# Patient Record
Sex: MALE | Race: WHITE | NOT HISPANIC OR LATINO | Employment: OTHER | URBAN - METROPOLITAN AREA
[De-identification: names, ages, dates, MRNs, and addresses within clinical notes are randomized per-mention and may not be internally consistent; named-entity substitution may affect disease eponyms.]

---

## 2017-11-16 ENCOUNTER — TRANSFERRED RECORDS (OUTPATIENT)
Dept: HEALTH INFORMATION MANAGEMENT | Facility: CLINIC | Age: 57
End: 2017-11-16

## 2018-02-06 ENCOUNTER — OFFICE VISIT (OUTPATIENT)
Dept: FAMILY MEDICINE | Facility: CLINIC | Age: 58
End: 2018-02-06
Payer: COMMERCIAL

## 2018-02-06 VITALS
WEIGHT: 256.2 LBS | BODY MASS INDEX: 35.87 KG/M2 | HEART RATE: 76 BPM | HEIGHT: 71 IN | DIASTOLIC BLOOD PRESSURE: 86 MMHG | SYSTOLIC BLOOD PRESSURE: 138 MMHG | TEMPERATURE: 98.1 F | RESPIRATION RATE: 16 BRPM

## 2018-02-06 DIAGNOSIS — L72.3 SEBACEOUS CYST: Primary | ICD-10-CM

## 2018-02-06 PROCEDURE — 10160 PNXR ASPIR ABSC HMTMA BULLA: CPT | Performed by: FAMILY MEDICINE

## 2018-02-06 RX ORDER — NITROGLYCERIN 0.4 MG/1
0.4 TABLET SUBLINGUAL
COMMUNITY
Start: 2014-05-13 | End: 2021-07-23

## 2018-02-06 ASSESSMENT — PAIN SCALES - GENERAL: PAINLEVEL: NO PAIN (1)

## 2018-02-06 NOTE — PATIENT INSTRUCTIONS
Thank you for choosing [facility].  You may be receiving a survey in the mail from [facility] regarding your visit today.  Please take a few minutes to complete and return the survey to let us know how we are doing.      Our Clinic hours are:  Mondays    7:20 am - 7 pm  Tues -  Fri  7:20 am - 5 pm    Clinic Phone: [phone]    The clinic lab opens at 7:30 am Mon - Fri and appointments are required.    [facility]. [phone]  Monday-Thursday 8 am - 7pm  Tues/Wed/Fri 8 am - 5:30 pm

## 2018-02-06 NOTE — PROGRESS NOTES
"  SUBJECTIVE:   Brent Stewart is a 57 year old male who presents to clinic today for the following health issues:      Concern -   Chief Complaint   Patient presents with     Cyst     on right cheek. seems to be getting bigger. and cyst on back of left shoulder       Onset: cyst on cheek x 3 years and cyst on back of left shoulder x 2 years.    He previously has had a sebaceous cyst removed from the scalp    Problem list and histories reviewed & adjusted, as indicated.  Additional history: The cyst on his cheek area was bumped against a box in the garage and has become darker in color since then        Reviewed and updated as needed this visit by clinical staff  Tobacco  Allergies  Meds  Med Hx  Surg Hx  Fam Hx  Soc Hx      Reviewed and updated as needed this visit by Provider             OBJECTIVE:                                                    /86 (BP Location: Right arm, Cuff Size: Adult Large)  Pulse 76  Temp 98.1  F (36.7  C) (Tympanic)  Resp 16  Ht 5' 11\" (1.803 m)  Wt 256 lb 3.2 oz (116.2 kg)  BMI 35.73 kg/m2    GENERAL: healthy, alert and no distress  EYES: Eyes grossly normal to inspection, extraocular movements - intact, and PERRL  SKIN: 1 cm soft sebaceous cyst on the right side of the face by the malar ridge, somewhat bluish in color;  Left upper posterior back has a 1.2 cm soft sebaceous cyst         ASSESSMENT/PLAN:                                                    ASSESSMENT:  1. Sebaceous cyst    Right side of face and left upper back  PLAN:  After informed consent, both of these cysts were prepped with Hibiclens, anesthetized with 1% lidocaine.  Then the cyst was incised and punctured in the whitish material was evacuated using a forceps.  I placed a corner of the gauze dressing into the cavity on the one on the face with instructions to leave that in for 8 hours and then cover with a simple Band-Aid.  In like manner the same procedure was carried out on the cyst on his left " upper back.  After evacuation, this cyst was just covered with gauze with no wick.          Orders Placed This Encounter     PUNCTURE ASPIRATION ABSCESS/HEMATOMA/CYST     PUNCTURE ASPIRATION ABSCESS/HEMATOMA/CYST     nitroGLYcerin (NITROSTAT) 0.4 MG sublingual tablet       Patient Instructions     He was advised to change the dressings in 8 hours and cover with a simple Band-Aid until any drainage stops    Thank you for choosing [facility].  You may be receiving a survey in the mail from [facility] [facility] [employer] regarding your visit today.  Please take a few minutes to complete and return the survey to let us know how we are doing.      Our Clinic hours are:  Mondays    7:20 am - 7 pm  Tues -  Fri  7:20 am - 5 pm    Clinic Phone: [phone]    The clinic lab opens at 7:30 am Mon - Fri and appointments are required.    [facility]  Ph. [phone]  Monday-Thursday 8 am - 7pm  Tues/Wed/Fri 8 am - 5:30 pm             SHEMAR Archuleta  [facility]

## 2018-02-06 NOTE — MR AVS SNAPSHOT
"              After Visit Summary   2/6/2018    Brent Stewart    MRN: [id number]           Patient Information     Date Of Birth          1960        Visit Information        Provider Department      2/6/2018 11:00 AM SHEMAR Archuleta MD [facility]        Today's Diagnoses     Sebaceous cyst    -  1      Care Instructions          Thank you for choosing [facility].  You may be receiving a survey in the mail from [facility] regarding your visit today.  Please take a few minutes to complete and return the survey to let us know how we are doing.      Our Clinic hours are:  Mondays    7:20 am - 7 pm  Tues -  Fri  7:20 am - 5 pm    Clinic Phone: [phone]    The clinic lab opens at 7:30 am Mon - Fri and appointments are required.    [facility]  Ph. [phone]  Monday-Thursday 8 am - 7pm  Tues/Wed/Fri 8 am - 5:30 pm                 Follow-ups after your visit        Who to contact     If you have questions or need follow up information about today's clinic visit or your schedule please contact [facility] directly at [phone].  Normal or non-critical lab and imaging results will be communicated to you by MyChart, letter or phone within 4 business days after the clinic has received the results. If you do not hear from us within 7 days, please contact the clinic through MyChart or phone. If you have a critical or abnormal lab result, we will notify you by phone as soon as possible.  Submit refill requests through [email or web address] or call your pharmacy and they will forward the refill request to us. Please allow 3 business days for your refill to be completed.          Additional Information About Your Visit        MyChart Information     [email or web address] lets you send messages to your doctor, view your test results, renew your prescriptions, schedule appointments and more. To sign up, go to [email or web address] . Click on \"Log in\" on the left side of the screen, " "which will take you to the Welcome page. Then click on \"Sign up Now\" on the right side of the page.     You will be asked to enter the access code listed below, as well as some personal information. Please follow the directions to create your username and password.     Your access code is: [email or web address]  Expires: 2018 12:17 PM     Your access code will  in 90 days. If you need help or a new code, please call your [place] clinic or [phone].        Care EveryWhere ID     This is your Care EveryWhere ID. This could be used by other organizations to access your [place] medical records  [id number]        Your Vitals Were     Pulse Temperature Respirations Height BMI (Body Mass Index)       76 98.1  F (36.7  C) (Tympanic) 16 5' 11\" (1.803 m) 35.73 kg/m2        Blood Pressure from Last 3 Encounters:   18 138/86   14 134/80   14 150/77    Weight from Last 3 Encounters:   18 256 lb 3.2 oz (116.2 kg)   14 251 lb 3.2 oz (113.9 kg)              We Performed the Following     PUNCTURE ASPIRATION ABSCESS/HEMATOMA/CYST     PUNCTURE ASPIRATION ABSCESS/HEMATOMA/CYST        Primary Care Provider Office Phone # Fax #    R Win Archuleta [phone] [address] [facility] [address]        Equal Access to Services     [facility]: Hadii aad ku hadasho Soomaali, waaxda luqadaha, qaybta kaalmada adeegyada, shanta bosch . So [facility] [phone].    ATENCIÓN: Si habla español, tiene a beverly disposición servicios gratuitos de asistencia lingüística. Llame al [phone].    We comply with applicable federal civil rights laws and [place] laws. We do not discriminate on the basis of race, color, national origin, age, disability, sex, sexual orientation, or gender identity.            Thank you!     Thank you for choosing [facility]  for your care. Our goal is always to provide you with excellent care. Hearing back from " our patients is one way we can continue to improve our services. Please take a few minutes to complete the written survey that you may receive in the mail after your visit with us. Thank you!             Your Updated Medication List - Protect others around you: Learn how to safely use, store and throw away your medicines at www.disposemymeds.org.          This list is accurate as of 2/6/18 12:17 PM.  Always use your most recent med list.                   Brand Name Dispense Instructions for use Diagnosis    ATENOLOL PO      Take 25 mg by mouth daily        nitroGLYcerin 0.4 MG sublingual tablet    NITROSTAT     Place 0.4 mg under the tongue        OMEPRAZOLE PO      Take 20 mg by mouth daily

## 2021-07-23 ENCOUNTER — OFFICE VISIT (OUTPATIENT)
Dept: FAMILY MEDICINE | Facility: CLINIC | Age: 61
End: 2021-07-23
Payer: COMMERCIAL

## 2021-07-23 ENCOUNTER — TELEPHONE (OUTPATIENT)
Dept: FAMILY MEDICINE | Facility: CLINIC | Age: 61
End: 2021-07-23

## 2021-07-23 VITALS
BODY MASS INDEX: 33.6 KG/M2 | WEIGHT: 240 LBS | SYSTOLIC BLOOD PRESSURE: 138 MMHG | OXYGEN SATURATION: 96 % | DIASTOLIC BLOOD PRESSURE: 78 MMHG | HEIGHT: 71 IN | HEART RATE: 84 BPM | RESPIRATION RATE: 16 BRPM | TEMPERATURE: 97.2 F

## 2021-07-23 DIAGNOSIS — I10 ESSENTIAL HYPERTENSION: ICD-10-CM

## 2021-07-23 DIAGNOSIS — Z00.00 WELL ADULT EXAM: Primary | ICD-10-CM

## 2021-07-23 DIAGNOSIS — Z11.59 NEED FOR HEPATITIS C SCREENING TEST: ICD-10-CM

## 2021-07-23 DIAGNOSIS — E11.42 TYPE 2 DIABETES MELLITUS WITH DIABETIC POLYNEUROPATHY, WITHOUT LONG-TERM CURRENT USE OF INSULIN (H): Primary | ICD-10-CM

## 2021-07-23 DIAGNOSIS — Z13.220 SCREENING FOR HYPERLIPIDEMIA: ICD-10-CM

## 2021-07-23 DIAGNOSIS — Z12.11 SCREEN FOR COLON CANCER: ICD-10-CM

## 2021-07-23 DIAGNOSIS — N52.9 ERECTILE DYSFUNCTION, UNSPECIFIED ERECTILE DYSFUNCTION TYPE: ICD-10-CM

## 2021-07-23 DIAGNOSIS — B00.9 HSV (HERPES SIMPLEX VIRUS) INFECTION: ICD-10-CM

## 2021-07-23 DIAGNOSIS — E11.42 TYPE 2 DIABETES MELLITUS WITH DIABETIC POLYNEUROPATHY, WITHOUT LONG-TERM CURRENT USE OF INSULIN (H): ICD-10-CM

## 2021-07-23 DIAGNOSIS — Z11.4 SCREENING FOR HIV (HUMAN IMMUNODEFICIENCY VIRUS): ICD-10-CM

## 2021-07-23 LAB
ANION GAP SERPL CALCULATED.3IONS-SCNC: 4 MMOL/L (ref 3–14)
BUN SERPL-MCNC: 14 MG/DL (ref 7–30)
CALCIUM SERPL-MCNC: 9.3 MG/DL (ref 8.5–10.1)
CHLORIDE BLD-SCNC: 110 MMOL/L (ref 94–109)
CHOLEST SERPL-MCNC: 197 MG/DL
CO2 SERPL-SCNC: 27 MMOL/L (ref 20–32)
CREAT SERPL-MCNC: 0.79 MG/DL (ref 0.66–1.25)
FASTING STATUS PATIENT QL REPORTED: NO
GFR SERPL CREATININE-BSD FRML MDRD: >90 ML/MIN/1.73M2
GLUCOSE BLD-MCNC: 111 MG/DL (ref 70–99)
HBA1C MFR BLD: 8.1 % (ref 0–5.6)
HDLC SERPL-MCNC: 51 MG/DL
LDLC SERPL CALC-MCNC: 86 MG/DL
NONHDLC SERPL-MCNC: 146 MG/DL
POTASSIUM BLD-SCNC: 4.1 MMOL/L (ref 3.4–5.3)
SODIUM SERPL-SCNC: 141 MMOL/L (ref 133–144)
TRIGL SERPL-MCNC: 299 MG/DL

## 2021-07-23 PROCEDURE — 80061 LIPID PANEL: CPT | Performed by: FAMILY MEDICINE

## 2021-07-23 PROCEDURE — 86803 HEPATITIS C AB TEST: CPT | Performed by: FAMILY MEDICINE

## 2021-07-23 PROCEDURE — 83036 HEMOGLOBIN GLYCOSYLATED A1C: CPT | Performed by: FAMILY MEDICINE

## 2021-07-23 PROCEDURE — 80048 BASIC METABOLIC PNL TOTAL CA: CPT | Performed by: FAMILY MEDICINE

## 2021-07-23 PROCEDURE — 82043 UR ALBUMIN QUANTITATIVE: CPT | Performed by: FAMILY MEDICINE

## 2021-07-23 PROCEDURE — 36415 COLL VENOUS BLD VENIPUNCTURE: CPT | Performed by: FAMILY MEDICINE

## 2021-07-23 PROCEDURE — 99386 PREV VISIT NEW AGE 40-64: CPT | Performed by: FAMILY MEDICINE

## 2021-07-23 PROCEDURE — 99214 OFFICE O/P EST MOD 30 MIN: CPT | Mod: 25 | Performed by: FAMILY MEDICINE

## 2021-07-23 RX ORDER — ASPIRIN 81 MG/1
81 TABLET ORAL DAILY
COMMUNITY
End: 2023-02-10

## 2021-07-23 RX ORDER — ACYCLOVIR 400 MG/1
400 TABLET ORAL EVERY 8 HOURS
Qty: 15 TABLET | Refills: 0 | Status: SHIPPED | OUTPATIENT
Start: 2021-07-23 | End: 2022-08-16

## 2021-07-23 RX ORDER — METOPROLOL SUCCINATE 50 MG/1
50 TABLET, EXTENDED RELEASE ORAL DAILY
Qty: 90 TABLET | Refills: 4 | Status: SHIPPED | OUTPATIENT
Start: 2021-07-23 | End: 2022-04-06

## 2021-07-23 RX ORDER — SILDENAFIL CITRATE 20 MG/1
20-60 TABLET ORAL DAILY PRN
Qty: 30 TABLET | Refills: 4 | Status: SHIPPED | OUTPATIENT
Start: 2021-07-23

## 2021-07-23 RX ORDER — ACYCLOVIR 400 MG/1
400 TABLET ORAL
COMMUNITY
End: 2021-07-23

## 2021-07-23 ASSESSMENT — ENCOUNTER SYMPTOMS
ARTHRALGIAS: 1
FREQUENCY: 0
COUGH: 1
HEMATOCHEZIA: 0
HEMATURIA: 0
PARESTHESIAS: 1
ABDOMINAL PAIN: 0
SORE THROAT: 0
DIZZINESS: 0
MYALGIAS: 1
DIARRHEA: 0
EYE PAIN: 0
HEARTBURN: 1
HEADACHES: 1
NAUSEA: 0
CHILLS: 0
CONSTIPATION: 0
JOINT SWELLING: 1
SHORTNESS OF BREATH: 0
PALPITATIONS: 0
FEVER: 0

## 2021-07-23 ASSESSMENT — MIFFLIN-ST. JEOR: SCORE: 1905.76

## 2021-07-23 NOTE — TELEPHONE ENCOUNTER
Please call A1c did go up from previous. Now 8.1.  It is in a range where is would definitely recommend starting treatment. Normally we would start metformin but I know he was concerned about potential for diarrhea. Most people do not have a problem with this but it can happen. Other options would include Ozempic or Victoza which are once weekly non-insulin injections that lower blood sugar and also help with weight loss or glipizide which is an oral tablet. Does he have a preference? I would also recommend follow-up with diabetic ed to discuss lifestyle management.

## 2021-07-23 NOTE — LETTER
[facility]  [address]  [facility] [address]  Phone: [phone]       July 28, 2021         Brent Stewart  [facility] [address]            Dear Brent:    We have been trying to reach you by phone. Please see message below from Dr Harvey. Thank you!    Please call A1c did go up from previous. Now 8.1.  It is in a range where is would definitely recommend starting treatment. Normally we would start metformin but I know he was concerned about potential for diarrhea. Most people do not have a problem with this but it can happen. Other options would include Ozempic or Victoza which are once weekly non-insulin injections that lower blood sugar and also help with weight loss or glipizide which is an oral tablet. Does he have a preference? I would also recommend follow-up with diabetic ed to discuss lifestyle management.    Thank you,      Norah Harvey MD

## 2021-07-23 NOTE — PATIENT INSTRUCTIONS
Your BMI is Body mass index is Body mass index is 33.47 kg/m .     A BMI of 18.5 to 24.9 is in the healthy range. A person with a BMI of 25 to 29.9 is considered overweight, and someone with a BMI of 30 or greater is considered obese. More than two-thirds of American adults are considered overweight or obese.  Weight management is a personal decision.  If you are interested in exploring weight loss strategies, the following discussion covers the approaches that may be successful. Body mass index (BMI) is one way to tell whether you are at a healthy weight, overweight, or obese. It measures your weight in relation to your height.  Being overweight or obese increases the risk for further weight gain. Excess weight may lead to heart disease and diabetes.  Creating and following plans for healthy eating and physical activity may help you improve your health.  Weight control is part of healthy lifestyle and includes exercise, emotional health, and healthy eating habits. Careful eating habits lifelong are the mainstay of weight control. Though there are significant health benefits from weight loss, long-term weight loss with diet alone may be very difficult to achieve- studies show long-term success with dietary management alone in less than 10% of people. Attaining a healthy weight may be especially difficult to achieve in those with severe obesity. In some cases, medications, devices and surgical management might be considered.  What can you do?    Keep a food journal to help with mindful eating and finding ways to modify your diet.      Reduce the amount of processed food in your diet. Focus on adding vegetables, and lean proteins.    Reduce dietary carbohydrates. Limiting to  gm of carbohydrates per day has been shows to help boost weight loss.  If you have diabetes or are on diabetic medications do not do this without talking to your physician or healthcare provider.    Diet combined with exercise helps  maintain muscle while optimizing fat loss. Strength training is particularly important for building and maintaining muscle mass. Exercise helps reduce stress, increase energy, and improves fitness. Increasing exercise without diet control, however, may not burn enough calories to loose weight.         Start walking three days a week 10-20 minutes at a time    Work towards walking thirty minutes five days a week      Eventually, increase the speed of your walking for 1-2 minutes at time    In addition, we recommend that you review healthy lifestyles and methods for weight loss available through the National Institutes of Health patient information sites:  http://win.niddk.nih.gov/publications/index.htm    Also look into health and wellness programs that may be available through your health insurance provider, employer, local community center, or [employer].

## 2021-07-23 NOTE — NURSING NOTE
"Initial /78   Pulse 84   Temp 97.2  F (36.2  C) (Tympanic)   Resp 16   Ht 1.803 m (5' 11\")   Wt 108.9 kg (240 lb)   SpO2 96%   BMI 33.47 kg/m   Estimated body mass index is 33.47 kg/m  as calculated from the following:    Height as of this encounter: 1.803 m (5' 11\").    Weight as of this encounter: 108.9 kg (240 lb). .      "

## 2021-07-23 NOTE — PROGRESS NOTES
SUBJECTIVE:   CC: Brnet Stewart is an 61 year old male who presents for preventative health visit.       Patient has been advised of split billing requirements and indicates understanding: Yes  Healthy Habits:     Getting at least 3 servings of Calcium per day:  Yes    Bi-annual eye exam:  Yes    Dental care twice a year:  NO    Sleep apnea or symptoms of sleep apnea:  None    Diet:  Diabetic and Other    Frequency of exercise:  6-7 days/week    Duration of exercise:  30-45 minutes    Taking medications regularly:  Yes    Medication side effects:  None    PHQ-2 Total Score: 0    Additional concerns today:  Yes      Here to establish care. History of diabetes, hypertension, gerd, hsv and ED. Needs labs and medication refills.        Today's PHQ-2 Score:   PHQ-2 (  [employer]) 2021   Q1: Little interest or pleasure in doing things 0   Q2: Feeling down, depressed or hopeless 0   PHQ-2 Score 0   Q1: Little interest or pleasure in doing things Not at all   Q2: Feeling down, depressed or hopeless Not at all   PHQ-2 Score 0       Abuse: Current or Past(Physical, Sexual or Emotional)- NA  Do you feel safe in your environment?     Have you ever done Advance Care Planning? (For example, a Health Directive, POLST, or a discussion with a medical provider or your loved ones about your wishes):     Social History     Tobacco Use     Smoking status: Former Smoker     Types: Cigarettes     Quit date: 2005     Years since quittin.3     Smokeless tobacco: Former User   Substance Use Topics     Alcohol use: Yes     Comment: occas.         Alcohol Use 2021   Prescreen: >3 drinks/day or >7 drinks/week? Yes   AUDIT SCORE  5       Last PSA: No results found for: PSA    Reviewed orders with patient. Reviewed health maintenance and updated orders accordingly - Yes  Labs reviewed in EPIC  Patient Active Problem List   Diagnosis     Senile nuclear sclerosis     Esophageal reflux     Essential hypertension     Abnormal  chest CT     Right knee pain     Status post total right knee replacement     GERD (gastroesophageal reflux disease)     Type 2 diabetes mellitus with diabetic polyneuropathy, without long-term current use of insulin (H)     HSV (herpes simplex virus) infection     Erectile dysfunction, unspecified erectile dysfunction type     Past Surgical History:   Procedure Laterality Date     ARTHROSCOPY KNEE RT/LT Right     meniscectomy     HERNIA REPAIR, UMBILICAL  1980     JOINT REPLACEMTN, KNEE RT/LT Right 2015    Joint Replacement knee RT/LT     PHACOEMULSIFICATION WITH STANDARD INTRAOCULAR LENS IMPLANT  2014    Procedure: PHACOEMULSIFICATION WITH STANDARD INTRAOCULAR LENS IMPLANT;  Surgeon: Issac Lee MD;  Location: [facility]     PHACOEMULSIFICATION WITH STANDARD INTRAOCULAR LENS IMPLANT  7/3/2014    Procedure: PHACOEMULSIFICATION WITH STANDARD INTRAOCULAR LENS IMPLANT;  Surgeon: Issac Lee MD;  Location: [facility] OR       Social History     Tobacco Use     Smoking status: Former Smoker     Types: Cigarettes     Quit date: 2005     Years since quittin.3     Smokeless tobacco: Former User   Substance Use Topics     Alcohol use: Yes     Comment: occas.     Family History   Problem Relation Age of Onset     Diabetes Mother      Cerebrovascular Disease Mother      Diabetes Father      Cancer Father      Coronary Artery Disease Early Onset Father 55        CABG         Current Outpatient Medications   Medication Sig Dispense Refill     acyclovir (ZOVIRAX) 400 MG tablet Take 1 tablet (400 mg) by mouth every 8 hours for 5 days 15 tablet 0     aspirin 81 MG EC tablet Take 81 mg by mouth daily       metoprolol succinate ER (TOPROL-XL) 50 MG 24 hr tablet Take 1 tablet (50 mg) by mouth daily 90 tablet 4     Naproxen Sodium (ALEVE PO)        OMEPRAZOLE PO Take 20 mg by mouth daily       sildenafil (REVATIO) 20 MG tablet Take 1-3 tablets (20-60 mg) by mouth daily as needed (1 hour prior to sexual  activity) 30 tablet 4     glipiZIDE (GLUCOTROL XL) 5 MG 24 hr tablet Take 1 tablet (5 mg) by mouth daily 30 tablet 3     Allergies   Allergen Reactions     Nka [No Known Allergies]        Reviewed and updated as needed this visit by clinical staff  Tobacco  Allergies  Meds  Problems  Med Hx  Surg Hx  Fam Hx  Soc Hx          Reviewed and updated as needed this visit by Provider  Tobacco  Allergies  Meds  Problems  Med Hx  Surg Hx  Fam Hx         Past Medical History:   Diagnosis Date     Hypertension       Past Surgical History:   Procedure Laterality Date     ARTHROSCOPY KNEE RT/LT Right 7/14    meniscectomy     HERNIA REPAIR, UMBILICAL  1980     JOINT REPLACEMTN, KNEE RT/LT Right 11/2015    Joint Replacement knee RT/LT     PHACOEMULSIFICATION WITH STANDARD INTRAOCULAR LENS IMPLANT  6/19/2014    Procedure: PHACOEMULSIFICATION WITH STANDARD INTRAOCULAR LENS IMPLANT;  Surgeon: Issac Lee MD;  Location: [facility]     PHACOEMULSIFICATION WITH STANDARD INTRAOCULAR LENS IMPLANT  7/3/2014    Procedure: PHACOEMULSIFICATION WITH STANDARD INTRAOCULAR LENS IMPLANT;  Surgeon: Issac Lee MD;  Location: [facility] OR       Review of Systems   Constitutional: Negative for chills and fever.   HENT: Positive for congestion and hearing loss. Negative for ear pain and sore throat.    Eyes: Negative for pain and visual disturbance.   Respiratory: Positive for cough. Negative for shortness of breath.    Cardiovascular: Positive for peripheral edema. Negative for chest pain and palpitations.   Gastrointestinal: Positive for heartburn. Negative for abdominal pain, constipation, diarrhea, hematochezia and nausea.   Genitourinary: Positive for impotence. Negative for discharge, frequency, genital sores, hematuria and urgency.   Musculoskeletal: Positive for arthralgias, joint swelling and myalgias.   Skin: Negative for rash.   Neurological: Positive for headaches and paresthesias. Negative for dizziness.  "  Psychiatric/Behavioral: Negative for mood changes.         OBJECTIVE:   /78   Pulse 84   Temp 97.2  F (36.2  C) (Tympanic)   Resp 16   Ht 1.803 m (5' 11\")   Wt 108.9 kg (240 lb)   SpO2 96%   BMI 33.47 kg/m      Physical Exam  GENERAL: Pleasant, well appearing male.  HEENT: PERRL, EOMI.  NECK: supple, no thyromegaly or thyroid masses, no lymphadenopathy.  CV: RRR, no murmurs, rubs or gallops.  LUNGS: Clear to auscultation bilaterally, normal effort.  ABDOMEN: Soft, non-distended, non-tender.  No hepatosplenomegaly or palpable masses.    SKIN: warm and dry without obvious rashes.   EXTREMITIES: No edema, normal pulses.            ASSESSMENT/PLAN:   1. Well adult exam    2. Type 2 diabetes mellitus with diabetic polyneuropathy, without long-term current use of insulin (H)  Check labs. Further work-up and management as dictated by results.   - Hemoglobin A1c; Future  - Albumin Random Urine Quantitative with Creat Ratio; Future  - Albumin Random Urine Quantitative with Creat Ratio  - Hemoglobin A1c    3. Essential hypertension  Well controlled. Refilled medication. Check labs.    - metoprolol succinate ER (TOPROL-XL) 50 MG 24 hr tablet; Take 1 tablet (50 mg) by mouth daily  Dispense: 90 tablet; Refill: 4  - Basic metabolic panel; Future  - Basic metabolic panel    4. HSV (herpes simplex virus) infection  Well controlled. Refilled medication.     - acyclovir (ZOVIRAX) 400 MG tablet; Take 1 tablet (400 mg) by mouth every 8 hours for 5 days  Dispense: 15 tablet; Refill: 0    5. Erectile dysfunction, unspecified erectile dysfunction type  Stable. Refilled medication.    - sildenafil (REVATIO) 20 MG tablet; Take 1-3 tablets (20-60 mg) by mouth daily as needed (1 hour prior to sexual activity)  Dispense: 30 tablet; Refill: 4    6. Screening for hyperlipidemia  - Lipid panel reflex to direct LDL Fasting; Future  - Lipid panel reflex to direct LDL Fasting    7. Screen for colon cancer  - Adult Gastro Ref - " "Procedure Only; Future    8. Screening for HIV (human immunodeficiency virus)    9. Need for hepatitis C screening test  - Hepatitis C Screen Reflex to HCV RNA Quant and Genotype; Future  - Hepatitis C Screen Reflex to HCV RNA Quant and Genotype    Patient has been advised of split billing requirements and indicates understanding: COUNSELING:   Reviewed preventive health counseling, as reflected in patient instructions    Estimated body mass index is 33.47 kg/m  as calculated from the following:    Height as of this encounter: 1.803 m (5' 11\").    Weight as of this encounter: 108.9 kg (240 lb).         He reports that he quit smoking about 16 years ago. His smoking use included cigarettes. He has quit using smokeless tobacco.      Counseling Resources:  ATP IV Guidelines  Pooled Cohorts Equation Calculator  FRAX Risk Assessment  ICSI Preventive Guidelines  Dietary Guidelines for Americans, 2010  USDA's MyPlate  ASA Prophylaxis  Lung CA Screening    Norah Harvey MD  [facility]  "

## 2021-07-24 LAB
CREAT UR-MCNC: 119 MG/DL
MICROALBUMIN UR-MCNC: 14 MG/DL
MICROALBUMIN/CREAT UR: 11.76 MG/G CR (ref 0–17)

## 2021-07-26 ENCOUNTER — TELEPHONE (OUTPATIENT)
Dept: FAMILY MEDICINE | Facility: CLINIC | Age: 61
End: 2021-07-26

## 2021-07-26 LAB — HCV AB SERPL QL IA: NONREACTIVE

## 2021-07-26 NOTE — TELEPHONE ENCOUNTER
Diabetes Education Scheduling Outreach #1:    Call to patient to schedule. Invalid phone number.    Erinn Ferraro OnCall  Diabetes and Nutrition Scheduling

## 2021-08-02 ENCOUNTER — TELEPHONE (OUTPATIENT)
Dept: FAMILY MEDICINE | Facility: CLINIC | Age: 61
End: 2021-08-02

## 2021-08-02 DIAGNOSIS — E11.42 TYPE 2 DIABETES MELLITUS WITH DIABETIC POLYNEUROPATHY, WITHOUT LONG-TERM CURRENT USE OF INSULIN (H): Primary | ICD-10-CM

## 2021-08-02 NOTE — TELEPHONE ENCOUNTER
"Unable to leave message. No answering machine. Recording said \"not available and to try again later.\"  Janis Dean RN  "

## 2021-08-02 NOTE — TELEPHONE ENCOUNTER
Reason for call:  Patient reporting a symptom    Symptom or request: Pt calling - He rec'd a letter from Dr. Harvey.  We had his wrong phone # and it has been updated in pt's chart.  Pt calling to answer Dr. Harvey's questions about his diabetes.  Please call patient and advise.      Duration (how long have symptoms been present): ongoing    Have you been treated for this before? Yes    Additional comments:     Phone Number patient can be reached at:  Home number on file [phone] (home)    Best Time:  any    Can we leave a detailed message on this number:  YES    Call taken on 8/2/2021 at 3:14 PM by Corrina Lozada

## 2021-08-03 NOTE — TELEPHONE ENCOUNTER
Spoke with pt and he would like to try the Glipizide for his elevated A1C.  [facility].  Pt will check if med is @ the Pharmacy on 8/11/21   Theodora Blanchard

## 2021-08-04 RX ORDER — GLIPIZIDE 5 MG/1
5 TABLET, FILM COATED, EXTENDED RELEASE ORAL DAILY
Qty: 30 TABLET | Refills: 3 | Status: SHIPPED | OUTPATIENT
Start: 2021-08-04 | End: 2021-09-20

## 2021-08-10 ENCOUNTER — TELEPHONE (OUTPATIENT)
Dept: FAMILY MEDICINE | Facility: CLINIC | Age: 61
End: 2021-08-10

## 2021-08-10 DIAGNOSIS — R91.8 PULMONARY NODULES: Primary | ICD-10-CM

## 2021-08-11 NOTE — TELEPHONE ENCOUNTER
Please call:    I was reviewing his previous records and found an abnormal chest CT from 6/12/2014. There was a 4.5 indeterminate pulmonary nodule and follow-up chest CT was recommended but I could not find any follow-up imaging. I would recommend a chest CT to follow-up on this. If resolved or stable no further follow-up needed after this.  If it's grown then he wound need to follow-up with pulmonology.    6/12/2014 8:59 AM     INDICATION: Chest pain   COMPARISON: None.     FINDINGS:     LIMITED CHEST: There are several small noncalcified nodules in both lungs   including a dominant 4.5 mm nodule in the superior segment of the lingula   image 21 series 5. There is a 2 mm nodule in the left lower lobe on image   32. There is a 2 mm subpleural nodule in the right upper lobe image 10   series 5. There is a 2 mm subpleural nodule in the right middle lobe on   image 22.   LIMITED MEDIASTINUM: Negative.   LIMITED UPPER ABDOMEN: Negative     CONCLUSION:     1.  There are several small indeterminate nodules in both lungs including a    dominant 4.5 mm nodule in the lingula. See pulmonary nodule guidelines   below.   2.  Please refer to cardiologist's dictation for the cardiac CT report.     Recommendations for pulmonary nodule followup according to Fleischner   Society Guidelines, Radiology 2005; 237:395-400.     Nodule size less than or equal to 4 mm   Low-risk patients: No followup needed.   High-risk patients: Followup at 12 months and if no change, no further   imaging needed.     Nodule size > 4-6 mm   Low-risk patients: Followup at 12 months and if no change, no further   imaging needed.   High-risk patients: Initial followup CT at 6-12 months and then at 18-24   months if no change.

## 2021-08-11 NOTE — TELEPHONE ENCOUNTER
"Patient reports that he had a follow up CT at [facility] and was told \"it was from a previous pneumonia\"and no further follow up was necessary.    Patient is asking for something for migraines - never been treated in the past but would like to try something prescription.  Usually uses OTC migraine med.  Please advise.    Vale Whalen RN    "

## 2021-08-13 NOTE — RESULT ENCOUNTER NOTE
Disregard previous note. Patient was already notified of results:          Please call A1c did go up from previous. Now 8.1.  It is in a range where is would definitely recommend starting treatment. Normally we would start metformin but I know he was concerned about potential for diarrhea. Most people do not have a problem with this but it can happen. Other options would include Ozempic or Victoza which are once weekly non-insulin injections that lower blood sugar and also help with weight loss or glipizide which is an oral tablet. Does he have a preference? I would also recommend follow-up with diabetic ed to discuss lifestyle management.

## 2021-09-20 ENCOUNTER — ALLIED HEALTH/NURSE VISIT (OUTPATIENT)
Dept: EDUCATION SERVICES | Facility: CLINIC | Age: 61
End: 2021-09-20
Payer: COMMERCIAL

## 2021-09-20 DIAGNOSIS — E11.9 DIABETES MELLITUS WITHOUT COMPLICATION (H): Primary | ICD-10-CM

## 2021-09-20 DIAGNOSIS — E11.42 TYPE 2 DIABETES MELLITUS WITH DIABETIC POLYNEUROPATHY, WITHOUT LONG-TERM CURRENT USE OF INSULIN (H): ICD-10-CM

## 2021-09-20 PROCEDURE — G0108 DIAB MANAGE TRN  PER INDIV: HCPCS

## 2021-09-20 NOTE — PATIENT INSTRUCTIONS
Diabetes Support Resources:  1. Stop Glipizide    2. Trulicity 0.75 mg weekly    3. Try to follow the meal plan I gave you.   Portion out your carbs.  Starting your day with breakfast.      4. Check your BG when you wake up ( )  Some days test 2 hrs after one of your meals (<180)     Bring blood glucose meter and logbook with you to all doctor and follow-up appointments.    Diabetes Education Telephone Visit Follow-up:    We realize your time is valuable and your health is important! We offer a convenient Telephone Visit follow up! It s a quick way to check in for a medication dose adjustment without having to come back to clinic as soon.    Telephone Visits are often covered by insurance. Please check with your insurance plan to see if this type of visit is covered. If not, the cost is less expensive than an office visit:      Up to 10 minutes (Code 39566): $30    11-20 minutes (Code 55060): $59    More than 20 minutes (Code 03811): $85    Talk with your Diabetes Educator if you want to learn more.      [facility]:  For Your Diabetes Education and Nutrition Appointments Call:  [phone]   For Diabetes Education or Nutrition Related Questions:   Phone: [phone]  Send [employer] Message   If you need a medication refill please contact your pharmacy. Please allow 3 business days for your refills to be completed.

## 2021-09-20 NOTE — PROGRESS NOTES
"Diabetes Self-Management Education & Support    Presents for: Individual review    SUBJECTIVE/OBJECTIVE:  Presents for: Individual review  Accompanied by: Self  Diabetes education in the past 24mo: Yes (in [place] in [place])  Focus of Visit: Healthy Eating, Monitoring, Taking Medication, Assistance w/ making life changes  Diabetes type: Type 2  Disease course: Stable  Diabetes management related comments/concerns: Not at this time, what i can eat and drink  Transportation concerns: No  Difficulty affording diabetes medication?: No  Difficulty affording diabetes testing supplies?: No  Other concerns:: None  Cultural Influences/Ethnic Background:  American      Diabetes Symptoms & Complications:  Fatigue: Sometimes  Neuropathy: No  Polydipsia: No  Polyphagia: Sometimes  Polyuria: No  Visual change: No  Slow healing wounds: No  Symptom course: Improving  Weight trend: Decreasing  Autonomic neuropathy: No  CVA: Other  Heart disease: No  Nephropathy: No  Peripheral neuropathy: No  Peripheral Vascular Disease: No  Retinopathy: No  Sexual dysfunction: No    Patient Problem List and Family Medical History reviewed for relevant medical history, current medical status, and diabetes risk factors.    Vitals:  There were no vitals taken for this visit.  Estimated body mass index is 33.47 kg/m  as calculated from the following:    Height as of 7/23/21: 1.803 m (5' 11\").    Weight as of 7/23/21: 108.9 kg (240 lb).   Last 3 BP:   BP Readings from Last 3 Encounters:   07/23/21 138/78   02/06/18 138/86   11/07/14 134/80       History   Smoking Status     Former Smoker     Types: Cigarettes     Quit date: 4/1/2005   Smokeless Tobacco     Former User       Labs:  Lab Results   Component Value Date    A1C 8.1 07/23/2021     Lab Results   Component Value Date     07/23/2021     Lab Results   Component Value Date    LDL 86 07/23/2021     Direct Measure HDL   Date Value Ref Range Status   07/23/2021 51 >=40 mg/dL Final     Comment:     " 0-19 years:       Greater than or equal to 45 mg/dL   Low: Less than 40 mg/dL   Borderline low: 40-44 mg/dL     20 years and older:   Female: Greater than or equal to 50 mg/dL   Male:   Greater than or equal to 40 mg/dL        ]  GFR Estimate   Date Value Ref Range Status   07/23/2021 >90 >60 mL/min/1.73m2 Final     Comment:     As of July 11, 2021, eGFR is calculated by the CKD-EPI creatinine equation, without race adjustment. eGFR can be influenced by muscle mass, exercise, and diet. The reported eGFR is an estimation only and is only applicable if the renal function is stable.     No results found for: GFRESTBLACK  Lab Results   Component Value Date    CR 0.79 07/23/2021     No results found for: MICROALBUMIN    Healthy Eating:  Healthy Eating Assessed Today: Yes  Cultural/Confucianist diet restrictions?: No  Meal planning/habits: Avoiding sweets, Calorie counting, Carb counting, Low salt  How many times a week on average do you eat food made away from home (restaurant/take-out)?: 1  Meals include: Dinner, Afternoon Snack, Evening Snack  Breakfast: skips if he is eating could be eggs  Lunch: sandwich, ham,cheese tang,  Dinner: meatloaf, mashed potatoes and gravy.  Snacks: likes apples,  Other: has been doing keto diet  Beverages: Water, Coffee, Milk, Juice, Alcohol  Has patient met with a dietitian in the past?: No    Being Active:  Being Active Assessed Today: Yes  Exercise:: Yes  Days per week of moderate to strenuous exercise (like a brisk walk): 7  On average, minutes per day of exercise at this level: 40  How intense was your typical exercise? : Moderate (like brisk walking)  Exercise Minutes per Week: 280  Barrier to exercise: Other    Monitoring:  Monitoring Assessed Today: Yes  Did patient bring glucose meter to appointment? : Yes  Blood Glucose Meter: One Touch  Times checking blood sugar at home (number): 2  Times checking blood sugar at home (per): Day  Blood glucose trend:  Decreasing    121,124,109,115,123,105,138 prior to the his fasting numbers Justin did have blood sugars in the 500s and he said at one time 700    Taking Medications:  Diabetes Medication(s)     Incretin Mimetic Agents (GLP-1 Receptor Agonists)       dulaglutide (TRULICITY) 0.75 MG/0.5ML pen    Inject 0.75 mg Subcutaneous every 7 days          Taking Medication Assessed Today: Yes  Current Treatments: Oral Medication (taken by mouth)  Problems taking diabetes medications regularly?: No  Diabetes medication side effects?: No    Problem Solving:  Problem Solving Assessed Today: No              Reducing Risks:  Diabetes Risks: Age over 45 years, Family History, Hypertriglyceridemia  CAD Risks: Diabetes Mellitus, Male sex, Obesity, Family history    Healthy Coping:  Healthy Coping Assessed Today: Yes  Emotional response to diabetes: Uncertain, Acceptance  Informal Support system:: Family  Stage of change: PREPARATION (Decided to change - considering how)  Patient Activation Measure Survey Score:  No flowsheet data found.    Diabetes knowledge and skills assessment:   Patient is knowledgeable in diabetes management concepts related to: Being Active, Monitoring and Taking Medication    Patient needs further education on the following diabetes management concepts: Healthy Eating, Monitoring and Taking Medication    Based on learning assessment above, most appropriate setting for further diabetes education would be: Individual setting.      INTERVENTIONS:    Education provided today on:  AADE Self-Care Behaviors:  Diabetes Pathophysiology  Healthy Eating: carbohydrate counting, consistency in amount, composition, and timing of food intake, weight reduction, heart healthy diet, eating out, portion control, plate planning method and label reading  Being Active: relationship to blood glucose and describe appropriate activity program  Monitoring: purpose, proper technique, log and interpret results, individual blood glucose  targets and frequency of monitoring  Taking Medication: action of prescribed medication, drawing up, administering and storing injectable diabetes medications, proper site selection and rotation for injections, side effects of prescribed medications and when to take medications  GLP-1 administration technique taught today. Patient verbalized understanding and was able to perform an accurate return demonstration of administration technique. Side effects were discussed, if patient has any abdominal pain, with or without nausea and/or vomiting, stop medication, call provider, clinic or go to the emergency room.    Opportunities for ongoing education and support in diabetes-self management were discussed.    Pt verbalized understanding of concepts discussed and recommendations provided today.       Education Materials Provided:  [facility] Understanding Diabetes Booklet, Carbohydrate Counting and My Plate Planner      ASSESSMENT:  Rena comes today to learn more about diabetes and management he has been on glipizide which does not think for his liver output, and Justin tends to follow a keto-based diet and his blood sugars go up and down down during the day when he is not eating breakfast or lunch and very high at dinner when he chooses to over eat his blood sugars in some mornings are high he is back getting enough carbs in.  I reviewed the meal plan suggested for him to follow we have taken him off glipizide and he is going to trial Trulicity 0.5 mg weekly and see how that works for him he stated Metformin did not agree with him in the past so at this point he was not willing to restart it depending on his blood sugars when I see him again we can increase his Trulicity to 1.5 mg dose he is to let me know after he finishes his fourth pen to see where his blood sugars are he is to check his blood sugars every morning and 2 hours after some of his meals at first I think he was a little reluctant to learn about  diabetes by the time he left he was glad he came        Patient's most recent   Lab Results   Component Value Date    A1C 8.1 07/23/2021    is not meeting goal of <7.0    PLAN  See Patient Instructions for co-developed, patient-stated behavior change goals.  AVS printed and provided to patient today. See Follow-Up section for recommended follow-up.    Amberly Grady RN/LOLLY Ferraro Diabetes Educator    Time Spent: 60 minutes  Encounter Type: Individual    Any diabetes medication dose changes were made via the CDE Protocol and Collaborative Practice Agreement with the patient's primary care provider. A copy of this encounter was shared with the provider.

## 2021-09-22 ENCOUNTER — TELEPHONE (OUTPATIENT)
Dept: EDUCATION SERVICES | Facility: CLINIC | Age: 61
End: 2021-09-22

## 2021-09-22 NOTE — TELEPHONE ENCOUNTER
Justin Gaming left us a message that his new landline phone number is [phone]. I put this number in his chart. He said this number might be easier to get ahold of him at than his cell phone.     That was all he said in his message. Were you expecting to hear from him about anything today? I see you saw him on Monday.    Thanks!    Shona Lee RN, [facility]

## 2021-09-22 NOTE — TELEPHONE ENCOUNTER
No follow-up needed. He was just leaving his new number (landline phone) because his cell phone has not been reliable.    Shona Lee RN, [facility]

## 2021-11-05 ENCOUNTER — TELEPHONE (OUTPATIENT)
Dept: EDUCATION SERVICES | Facility: CLINIC | Age: 61
End: 2021-11-05

## 2021-11-05 NOTE — TELEPHONE ENCOUNTER
Nathan/Di,    Justin left a message last night that he can't make his Monday appt in [facility] with Amberly. I cancelled that appt and put a hold on the opening for Amberly to use only.    Can you please contact Justin to help him reschedule with Amberly.    Thanks!    Shona Lee RN, [facility]

## 2021-12-20 ENCOUNTER — TELEPHONE (OUTPATIENT)
Dept: EDUCATION SERVICES | Facility: CLINIC | Age: 61
End: 2021-12-20

## 2021-12-20 DIAGNOSIS — Z91.199 NO-SHOW FOR APPOINTMENT: Primary | ICD-10-CM

## 2022-04-06 ENCOUNTER — OFFICE VISIT (OUTPATIENT)
Dept: FAMILY MEDICINE | Facility: CLINIC | Age: 62
End: 2022-04-06
Payer: COMMERCIAL

## 2022-04-06 VITALS
TEMPERATURE: 97.8 F | WEIGHT: 247 LBS | OXYGEN SATURATION: 97 % | SYSTOLIC BLOOD PRESSURE: 148 MMHG | RESPIRATION RATE: 16 BRPM | HEART RATE: 89 BPM | DIASTOLIC BLOOD PRESSURE: 92 MMHG | BODY MASS INDEX: 34.58 KG/M2 | HEIGHT: 71 IN

## 2022-04-06 DIAGNOSIS — G89.29 CHRONIC PAIN OF BOTH KNEES: ICD-10-CM

## 2022-04-06 DIAGNOSIS — I10 ESSENTIAL HYPERTENSION: ICD-10-CM

## 2022-04-06 DIAGNOSIS — B00.9 HSV (HERPES SIMPLEX VIRUS) INFECTION: ICD-10-CM

## 2022-04-06 DIAGNOSIS — M25.562 CHRONIC PAIN OF BOTH KNEES: ICD-10-CM

## 2022-04-06 DIAGNOSIS — M25.561 CHRONIC PAIN OF BOTH KNEES: ICD-10-CM

## 2022-04-06 DIAGNOSIS — Z12.11 SCREEN FOR COLON CANCER: ICD-10-CM

## 2022-04-06 DIAGNOSIS — E11.42 TYPE 2 DIABETES MELLITUS WITH DIABETIC POLYNEUROPATHY, WITHOUT LONG-TERM CURRENT USE OF INSULIN (H): Primary | ICD-10-CM

## 2022-04-06 LAB
ALBUMIN SERPL-MCNC: 4 G/DL (ref 3.4–5)
ALP SERPL-CCNC: 90 U/L (ref 40–150)
ALT SERPL W P-5'-P-CCNC: 34 U/L (ref 0–70)
ANION GAP SERPL CALCULATED.3IONS-SCNC: 7 MMOL/L (ref 3–14)
AST SERPL W P-5'-P-CCNC: 15 U/L (ref 0–45)
BILIRUB SERPL-MCNC: 0.6 MG/DL (ref 0.2–1.3)
BUN SERPL-MCNC: 16 MG/DL (ref 7–30)
CALCIUM SERPL-MCNC: 9.5 MG/DL (ref 8.5–10.1)
CHLORIDE BLD-SCNC: 104 MMOL/L (ref 94–109)
CHOLEST SERPL-MCNC: 204 MG/DL
CO2 SERPL-SCNC: 27 MMOL/L (ref 20–32)
CREAT SERPL-MCNC: 0.79 MG/DL (ref 0.66–1.25)
FASTING STATUS PATIENT QL REPORTED: YES
GFR SERPL CREATININE-BSD FRML MDRD: >90 ML/MIN/1.73M2
GLUCOSE BLD-MCNC: 149 MG/DL (ref 70–99)
HBA1C MFR BLD: 7.5 % (ref 0–5.6)
HDLC SERPL-MCNC: 39 MG/DL
LDLC SERPL CALC-MCNC: 107 MG/DL
LDLC SERPL CALC-MCNC: ABNORMAL MG/DL
NONHDLC SERPL-MCNC: 165 MG/DL
POTASSIUM BLD-SCNC: 4.2 MMOL/L (ref 3.4–5.3)
PROT SERPL-MCNC: 8 G/DL (ref 6.8–8.8)
SODIUM SERPL-SCNC: 138 MMOL/L (ref 133–144)
TRIGL SERPL-MCNC: 409 MG/DL

## 2022-04-06 PROCEDURE — 80061 LIPID PANEL: CPT | Performed by: FAMILY MEDICINE

## 2022-04-06 PROCEDURE — 83721 ASSAY OF BLOOD LIPOPROTEIN: CPT | Mod: 59 | Performed by: FAMILY MEDICINE

## 2022-04-06 PROCEDURE — 83036 HEMOGLOBIN GLYCOSYLATED A1C: CPT | Performed by: FAMILY MEDICINE

## 2022-04-06 PROCEDURE — 36415 COLL VENOUS BLD VENIPUNCTURE: CPT | Performed by: FAMILY MEDICINE

## 2022-04-06 PROCEDURE — 99214 OFFICE O/P EST MOD 30 MIN: CPT | Performed by: FAMILY MEDICINE

## 2022-04-06 PROCEDURE — 80053 COMPREHEN METABOLIC PANEL: CPT | Performed by: FAMILY MEDICINE

## 2022-04-06 RX ORDER — LISINOPRIL 5 MG/1
5 TABLET ORAL DAILY
Qty: 30 TABLET | Refills: 1 | Status: SHIPPED | OUTPATIENT
Start: 2022-04-06 | End: 2022-10-27

## 2022-04-06 RX ORDER — ACYCLOVIR 400 MG/1
400 TABLET ORAL EVERY 8 HOURS
Qty: 15 TABLET | Refills: 4 | Status: ON HOLD | OUTPATIENT
Start: 2022-04-06 | End: 2022-11-04

## 2022-04-06 NOTE — PROGRESS NOTES
"  Assessment & Plan     Type 2 diabetes mellitus with diabetic polyneuropathy, without long-term current use of insulin (H)  Well controlled. Labs as below.  - OPTOMETRY REFERRAL; Future  - HEMOGLOBIN A1C; Future  - Lipid panel reflex to direct LDL Fasting; Future  - Comprehensive metabolic panel; Future  - HEMOGLOBIN A1C  - Lipid panel reflex to direct LDL Fasting  - Comprehensive metabolic panel  - LDL cholesterol direct    Essential hypertension  Previous blood pressures have been normal.  Advised monitor outpatient blood pressures.  If persistently elevated increase lisinopril.  - lisinopril (ZESTRIL) 5 MG tablet; Take 1 tablet (5 mg) by mouth daily    HSV (herpes simplex virus) infection  - acyclovir (ZOVIRAX) 400 MG tablet; Take 1 tablet (400 mg) by mouth every 8 hours for 5 days    Chronic pain of both knees  - XR Knee Bilateral 1/2 Views; Future    Screen for colon cancer  - Adult Gastro Ref - Procedure Only; Future             BMI:   Estimated body mass index is 34.69 kg/m  as calculated from the following:    Height as of this encounter: 1.797 m (5' 10.75\").    Weight as of this encounter: 112 kg (247 lb).           No follow-ups on file.    Norah Harvey MD  [facility]    Angel Anderson is a 61 year old who presents for the following health issues     Chief Complaint   Patient presents with     Diabetes     Cough         HPI     Diabetes Follow-up    How often are you checking your blood sugar? One time daily  What time of day are you checking your blood sugars (select all that apply)?  After meals  Have you had any blood sugars above 200?  No   Have you had any blood sugars below 70?  No    What symptoms do you notice when your blood sugar is low?  Shaky    What concerns do you have today about your diabetes? None     Do you have any of these symptoms? (Select all that apply)  No numbness or tingling in feet.  No redness, sores or blisters on feet.  No complaints " of excessive thirst.  No reports of blurry vision.  No significant changes to weight.    Have you had a diabetic eye exam in the last 12 months? No        BP Readings from Last 2 Encounters:   04/06/22 (!) 148/92   07/23/21 138/78     Hemoglobin A1C (%)   Date Value   04/06/2022 7.5 (H)   07/23/2021 8.1 (H)     LDL Cholesterol Calculated   Date Value   04/06/2022      Comment:     Cannot estimate LDL when triglyceride exceeds 400 mg/dL   07/23/2021 86 mg/dL     LDL Cholesterol Direct (mg/dL)   Date Value   04/06/2022 107 (H)               Hypertension Follow-up      Do you check your blood pressure regularly outside of the clinic? No     Are you following a low salt diet? Yes     Are your blood pressures ever more than 140 on the top number (systolic) OR more   than 90 on the bottom number (diastolic), for example 140/90? No      How many servings of fruits and vegetables do you eat daily?  2-3    On average, how many sweetened beverages do you drink each day (Examples: soda, juice, sweet tea, etc.  Do NOT count diet or artificially sweetened beverages)?   3    How many days per week do you exercise enough to make your heart beat faster? 3 or less    How many minutes a day do you exercise enough to make your heart beat faster? 9 or less   How many days per week do you miss taking your medication? 7- he has not been taking his B/P medication or his insulin.     What makes it hard for you to take your medications?  side effects    Acute Illness  Acute illness concerns: COUGH   Onset/Duration: Months   Symptoms: He was having pain in the left side of his ribs but it has subsided.   Fever: no  Chills/Sweats: no  Headache (location?): no  Sinus Pressure: no  Conjunctivitis:  no  Ear Pain: no  Rhinorrhea: YES  Congestion: YES- some   Sore Throat: no  Cough: YES-productive of clear sputum  Wheeze: YES  Decreased Appetite: no  Nausea: no  Vomiting: no  Diarrhea: no  Dysuria/Freq.: no  Dysuria or Hematuria:  "no  Fatigue/Achiness: no  Sick/Strep Exposure: no  Therapies tried and outcome: Mucinex, amoxicillin     Review of Systems   Constitutional, neuro, ENT, endocrine, pulmonary, cardiac, gastrointestinal, genitourinary, musculoskeletal, integument and psychiatric systems are negative, except as otherwise noted.       Objective    BP (!) 148/92 (BP Location: Right arm, Patient Position: Sitting, Cuff Size: Adult Large)   Pulse 89   Temp 97.8  F (36.6  C) (Tympanic)   Resp 16   Ht 1.797 m (5' 10.75\")   Wt 112 kg (247 lb)   SpO2 97%   BMI 34.69 kg/m    Body mass index is 34.69 kg/m .  Physical Exam   GENERAL: Pleasant, well appearing male.  HEENT: PERRL, EOMI.  NECK: supple, no thyromegaly or thyroid masses, no lymphadenopathy.  CV: RRR, no murmurs, rubs or gallops.  LUNGS: Clear to auscultation bilaterally, normal effort.  ABDOMEN: Soft, non-distended, non-tender.  No hepatosplenomegaly or palpable masses.    SKIN: warm and dry without obvious rashes.   EXTREMITIES: No edema, normal pulses.     Lab Results   Component Value Date    A1C 7.5 04/06/2022    A1C 8.1 07/23/2021                "

## 2022-04-06 NOTE — LETTER
April 6, 2022      Justin Stewart  [address] [address]        Dear ,    We are writing to inform you of your test results.    Your test results fall within the expected range(s) or remain unchanged from previous results.  Please continue with current treatment plan.    Resulted Orders   HEMOGLOBIN A1C   Result Value Ref Range    Hemoglobin A1C 7.5 (H) 0.0 - 5.6 %      Comment:      Normal <5.7%   Prediabetes 5.7-6.4%    Diabetes 6.5% or higher     Note: Adopted from ADA consensus guidelines.       If you have any questions or concerns, please call the clinic at the number listed above.       Sincerely,      Norah Harvey MD/salinas

## 2022-04-11 ENCOUNTER — TELEPHONE (OUTPATIENT)
Dept: FAMILY MEDICINE | Facility: CLINIC | Age: 62
End: 2022-04-11
Payer: COMMERCIAL

## 2022-04-11 NOTE — TELEPHONE ENCOUNTER
"Pt called to ask if Dr Harvey would add a chest XRAY order, he will be here tomorrow for a knee XRAY. He saw Dr Harvey 4/6, she has been having sharp pain in the 'left lower lobe\" for over a week, he also is having some wheezing. Pain is not constant, is worse in the morning. Pt has productive cough, has been taking mucinex. He had some amoxicillin at home, he took that 2 times a day for a few days and then once a day, he said it helped but symptoms are worsening again.  No fever now, he did have it previously. Pt says \"it feels like pneumonia.\"   Tiffanie Chase RN     "

## 2022-04-12 ENCOUNTER — ANCILLARY PROCEDURE (OUTPATIENT)
Dept: GENERAL RADIOLOGY | Facility: CLINIC | Age: 62
End: 2022-04-12
Attending: FAMILY MEDICINE
Payer: COMMERCIAL

## 2022-04-12 DIAGNOSIS — M25.561 CHRONIC PAIN OF BOTH KNEES: ICD-10-CM

## 2022-04-12 DIAGNOSIS — G89.29 CHRONIC PAIN OF BOTH KNEES: ICD-10-CM

## 2022-04-12 DIAGNOSIS — M25.562 CHRONIC PAIN OF BOTH KNEES: ICD-10-CM

## 2022-04-12 PROCEDURE — 73560 X-RAY EXAM OF KNEE 1 OR 2: CPT | Mod: FY | Performed by: RADIOLOGY

## 2022-04-12 NOTE — LETTER
April 15, 2022      Justin Stewart  [address] [address]      Dear ,    We are writing to inform you of your test results. Right knee replacement shows good position and alignment.  No evidence of hardware loosening or fracture.  Left knee shows mild arthritis.  Ultimately, there is really nothing surgical or procedural here.  You might benefit from some physical therapy.  Topical treatments like over-the-counter Voltaren gel may also be helpful.     Resulted Orders   XR Knee Bilateral 1/2 Views    Narrative    KNEE BILATERAL ONE TO TWO VIEWS   4/12/2022 12:59 PM     HISTORY:  Chronic pain of both knees.    COMPARISON: None.      Impression    IMPRESSION:  Right: Total knee arthroplasty. Excellent position and alignment of  components. No evidence of complication or periprosthetic fracture.  Trace effusion.    Left: Mild osteoarthrosis in the medial compartment. No fracture,  effusion or calcified intra-articular body.    JESUS BEARD MD         SYSTEM ID:  [id number]   If you have any questions or concerns, please call the clinic at the number listed above.     Sincerely,      Norah Harvey MD

## 2022-04-14 NOTE — RESULT ENCOUNTER NOTE
Please call the patient with the results. Notify that cholesterol significantly elevated.  Significant increase from last year.  I would recommend at this point starting cholesterol medication.  If he is agreeable I will fax a prescription for Lipitor to his pharmacy.

## 2022-04-15 NOTE — RESULT ENCOUNTER NOTE
Please mail letter: Right knee replacement shows good position and alignment.  No evidence of hardware loosening or fracture.  Left knee shows mild arthritis.  Ultimately, there is really nothing surgical or procedural here.  You might benefit from some physical therapy.  Topical treatments like over-the-counter Voltaren gel may also be helpful.

## 2022-04-18 ENCOUNTER — TELEPHONE (OUTPATIENT)
Dept: FAMILY MEDICINE | Facility: CLINIC | Age: 62
End: 2022-04-18
Payer: COMMERCIAL

## 2022-04-18 NOTE — TELEPHONE ENCOUNTER
Pt called, he went to his pharmacy but there was no order for lipitor at [facility] in [facility].   Tiffanie Chase RN

## 2022-04-20 RX ORDER — ATORVASTATIN CALCIUM 40 MG/1
40 TABLET, FILM COATED ORAL DAILY
Qty: 90 TABLET | Refills: 4 | Status: ON HOLD | OUTPATIENT
Start: 2022-04-20 | End: 2022-11-03

## 2022-04-20 NOTE — TELEPHONE ENCOUNTER
Patient Contact    Attempt # 1    Was call answered?  No.  Left message on voicemail with information to call back. and Unable to leave message.    Lizzie Mas RN on 4/20/2022 at 2:54 PM

## 2022-07-22 ENCOUNTER — TELEPHONE (OUTPATIENT)
Dept: FAMILY MEDICINE | Facility: CLINIC | Age: 62
End: 2022-07-22

## 2022-07-22 NOTE — LETTER
[facility]  [address]  [facility] [address]  [phone]          July 22, 2022    Brent Stewart                                                                                                                     [facility] Counts include 234 beds at the [facility] [address] [place] [id number]            Dear Brent,    July 22, 2022      Brent Stewart  [address] [place] [address] [address] [place] [id number]      Your healthcare team cares about your health. To provide you with the best care,   we have reviewed your chart and based on our findings, we see that you are due to:     - DIABETES FOLLOW UP: Schedule a diabetic follow up appointment as a Office Visit. Patients with diabetes should generally see their provider every 3-6 months.    - COLON CANCER SCREENING:  Call or mychart the clinic to schedule your colonoscopy or schedule/ your FIT Test, or Cologuard test   -Preventative Visit    If you have already completed these items, please contact the clinic via phone or   Mychart so your care team can review and update your records. Thank you for   choosing [facility] for your healthcare needs. For any questions,   concerns, or to schedule an appointment please contact the clinic.       Healthy Regards,      Your [facility] Care Team              Sincerely,         Norah Harvey MD

## 2022-07-22 NOTE — TELEPHONE ENCOUNTER
Patient Quality Outreach    Patient is due for the following:   Diabetes -  A1C, Eye Exam and Microalbumin  Colon Cancer Screening -  Colonoscopy  Physical  - DUE    NEXT STEPS:   Schedule a yearly physical    Type of outreach:    Sent letter.Phone- no answer      Next Steps:  Reach out within 90 days via Phone.    Max number of attempts reached: No. Will try again in 90 days if patient still on fail list.    Questions for provider review:    None     Meggan Panchal

## 2022-08-16 ENCOUNTER — OFFICE VISIT (OUTPATIENT)
Dept: FAMILY MEDICINE | Facility: CLINIC | Age: 62
End: 2022-08-16
Payer: COMMERCIAL

## 2022-08-16 VITALS
RESPIRATION RATE: 16 BRPM | TEMPERATURE: 97.5 F | DIASTOLIC BLOOD PRESSURE: 80 MMHG | WEIGHT: 252 LBS | HEIGHT: 71 IN | HEART RATE: 79 BPM | SYSTOLIC BLOOD PRESSURE: 138 MMHG | BODY MASS INDEX: 35.28 KG/M2 | OXYGEN SATURATION: 98 %

## 2022-08-16 DIAGNOSIS — L30.9 ECZEMA, UNSPECIFIED TYPE: Primary | ICD-10-CM

## 2022-08-16 DIAGNOSIS — Z12.11 SCREEN FOR COLON CANCER: ICD-10-CM

## 2022-08-16 DIAGNOSIS — E66.01 MORBID OBESITY (H): ICD-10-CM

## 2022-08-16 DIAGNOSIS — L72.9 CYST OF SKIN: ICD-10-CM

## 2022-08-16 PROCEDURE — 99214 OFFICE O/P EST MOD 30 MIN: CPT | Performed by: NURSE PRACTITIONER

## 2022-08-16 RX ORDER — CHOLECALCIFEROL (VITAMIN D3) 50 MCG
1 TABLET ORAL DAILY
COMMUNITY

## 2022-08-16 RX ORDER — TRIAMCINOLONE ACETONIDE 1 MG/G
OINTMENT TOPICAL 2 TIMES DAILY
Qty: 30 G | Refills: 1 | Status: SHIPPED | OUTPATIENT
Start: 2022-08-16

## 2022-08-16 ASSESSMENT — PAIN SCALES - GENERAL: PAINLEVEL: NO PAIN (0)

## 2022-08-16 NOTE — PATIENT INSTRUCTIONS
Try the ointment for your rashes twice daily.  Follow up with dermatology.  Schedule yearly physical with Dr. Harvey.      Our Clinic hours are:  Mondays    7:20 am - 7 pm  Tues -  Fri  7:20 am - 5 pm    Clinic Phone: [phone]    The clinic lab opens at 7:30 am Mon - Fri and appointments are required.    [facility]  Ph. [phone]  Monday  8 am - 7pm  Tues - Fri 8 am - 5:30 pm

## 2022-08-16 NOTE — PROGRESS NOTES
"  Assessment & Plan     Eczema, unspecified type    - triamcinolone (KENALOG) 0.1 % external ointment; Apply topically 2 times daily  Discussed how to take the medication(s), expected outcomes, potential side effects.    Morbid obesity (H)    Discussed health benefits of exercise and balanced diet to obtain healthy weight. Continue to monitor.    Screen for colon cancer    - Fecal colorectal cancer screen FIT - Future (S+30); Future    Cyst of skin    - Adult Dermatology Referral; Future  Follow up with dermatology.           BMI:   Estimated body mass index is 35.4 kg/m  as calculated from the following:    Height as of this encounter: 1.797 m (5' 10.75\").    Weight as of this encounter: 114.3 kg (252 lb).   Weight management plan: Discussed healthy diet and exercise guidelines    See Patient Instructions  Patient Instructions   Try the ointment for your rashes twice daily.  Follow up with dermatology.  Schedule yearly physical with Dr. Harvey.      Our Clinic hours are:  Mondays    7:20 am - 7 pm  Tues -  Fri  7:20 am - 5 pm    Clinic Phone: [phone]    The clinic lab opens at 7:30 am Mon - Fri and appointments are required.    [facility]  Ph. [phone]  Monday  8 am - 7pm  Tues - Fri 8 am - 5:30 pm           Return in about 2 weeks (around 8/30/2022) for or sooner if symptoms persist or worsen, Med Check, Physical Exam, Lab Work.    LEXI Rodarte CNP  [facility]    Angel Anderson is a 62 year old, presenting for the following health issues:  Derm Problem ( Rash On hands, cyst on right side of face )      History of Present Illness       Reason for visit:  To see the doctor    He eats 4 or more servings of fruits and vegetables daily.He consumes 3 sweetened beverage(s) daily.He exercises with enough effort to increase his heart rate 20 to 29 minutes per day.  He exercises with enough effort to increase his heart rate 7 days per week. He is " "missing 4 dose(s) of medications per week.         Rash  Onset/Duration: x 6 mo.  Description  Location: hands   Character: red  Itching: severe  Intensity:  severe  Progression of Symptoms:  same  Accompanying signs and symptoms:   Fever: No  Body aches or joint pain: No  Sore throat symptoms: No  Recent cold symptoms: No  History:           Previous episodes of similar rash: None  New exposures:  None  Recent travel: No  Exposure to similar rash: No  Precipitating or alleviating factors: none  Therapies tried and outcome: none    Current Outpatient Medications   Medication     aspirin 81 MG EC tablet     blood glucose (NO BRAND SPECIFIED) test strip     lisinopril (ZESTRIL) 5 MG tablet     MULTIPLE VITAMIN PO     Naproxen Sodium (ALEVE PO)     OMEPRAZOLE PO     sildenafil (REVATIO) 20 MG tablet     triamcinolone (KENALOG) 0.1 % external ointment     vitamin B-12 (CYANOCOBALAMIN) 100 MCG tablet     vitamin D3 (CHOLECALCIFEROL) 50 mcg (2000 units) tablet     acyclovir (ZOVIRAX) 400 MG tablet     atorvastatin (LIPITOR) 40 MG tablet     No current facility-administered medications for this visit.     Past Medical History:   Diagnosis Date     Hypertension        Review of Systems   Constitutional, HEENT, cardiovascular, pulmonary, gi and gu systems are negative, except as otherwise noted.      Objective    /80   Pulse 79   Temp 97.5  F (36.4  C)   Resp 16   Ht 1.797 m (5' 10.75\")   Wt 114.3 kg (252 lb)   SpO2 98%   BMI 35.40 kg/m    Body mass index is 35.4 kg/m .  Physical Exam   GENERAL: healthy, alert and no distress  SKIN: excoriated rash on both wrists and left lower ankle, c/w ezcema, he has a lump on right cheek, flesh colored and a larger lump on posterior   NECK: no adenopathy, no asymmetry  RESP: lungs clear to auscultation - no rales, rhonchi or wheezes  CV: regular rate and rhythm, normal S1 S2, no S3 or S4, no murmur  ABDOMEN: soft, obese  MS: no gross musculoskeletal defects noted             "          .  ..

## 2022-08-24 ENCOUNTER — NURSE TRIAGE (OUTPATIENT)
Dept: FAMILY MEDICINE | Facility: CLINIC | Age: 62
End: 2022-08-24

## 2022-08-24 NOTE — TELEPHONE ENCOUNTER
Reason for Call:  Other prescription    Detailed comments: Looking for medication for infection of tooth wisdom tooth pain for about 3 days, cant get into dental clinic.  Phar is [facility] in [place].    Phone Number Patient can be reached at: Home number on file [phone] (home)    Best Time: any    Can we leave a detailed message on this number? YES    Call taken on 8/24/2022 at 2:36 PM by Tiffanie Clarke

## 2022-08-24 NOTE — TELEPHONE ENCOUNTER
"S-(situation): Pt reporting gum pain, and states that he may have a piece of popcorn waller that irritated that area.     B-(background): Pt reports having pain in the past like this from eating popcorn.  DM2, HTN,     A-(assessment): Pt reports Left sided moderate gum pain, swelling and stated he feels warm.Pt report that the swelling is noticeable when looking at his face. He reports that he has had this in the past and its not related to  A tooth, its just form a waller in his gums.      R-(recommendations): Pt was advised to be seen in office now, due to facial swelling he reports noticeable. Pt was advised to be seen in the UC/ ED due to no available apt.Pt declined and stated he was just hoping he could get something to help. He was offered to make an apt. He declined. Pt was educated that he should be seen if it becomes worse, starts draining and fever is present. Pt agreed to go if it becomes worse     Reason for Disposition    Tooth is painful or swelling around a tooth    Face is swollen    Additional Information    Negative: SEVERE difficulty breathing (e.g., struggling for each breath, speaks in single words, stridor)    Negative: Sounds like a life-threatening emergency to the triager    Negative: Injury to tooth or teeth    Negative: Injury to mouth    Negative: Cold sore suspected (i.e., fever blister sore) on the outer lip    Negative: Pale cold skin and very weak (can't stand)    Negative: Similar pain previously and it was from 'heart attack'    Negative: Similar pain previously and it was from 'angina' and not relieved by nitroglycerin    Negative: Sounds like a life-threatening emergency to the triager    Negative: Chest pain    Negative: Toothache followed tooth injury    Negative: Patient sounds very sick or weak to the triager    Answer Assessment - Initial Assessment Questions  1. SYMPTOM: \"What's the main symptom you're concerned about?\" (e.g., chapped lips, dry mouth, lump, sores)     Gum " "pain  2. ONSET: \"When did the  Gum pain  start?\"     3 days ago  3. PAIN: \"Is there any pain?\" If Yes, ask: \"How bad is it?\" (Scale: 1-10; mild, moderate, severe)    - MILD (1-3):  doesn't interfere with eating or normal activities    - MODERATE (4-7): interferes with eating some solids and normal activities    - SEVERE (8-10):  excruciating pain, interferes with most normal activities    - SEVERE DYSPHAGIA: can't swallow liquids, drooling      moderate  4. CAUSE: \"What do you think is causing the symptoms?\"      Pt reports that he thiks that the pain is caused by popcorn hulls   5. OTHER SYMPTOMS: \"Do you have any other symptoms?\" (e.g., fever, sore throat, toothache, swelling)      Swelling the area, and under jaw is sore and swellon, Fever maybe  6. PREGNANCY: \"Is there any chance you are pregnant?\" \"When was your last menstrual period?\"      NA    Answer Assessment - Initial Assessment Questions  1. LOCATION: \"Which tooth is hurting?\"  (e.g., right-side/left-side, upper/lower, front/back)     Left side   2. ONSET: \"When did the toothache start?\"  (e.g., hours, days)      3 days ago   3. SEVERITY: \"How bad is the toothache?\"  (Scale 1-10; mild, moderate or severe)    - MILD (1-3): doesn't interfere with chewing     - MODERATE (4-7): interferes with chewing, interferes with normal activities, awakens from sleep      - SEVERE (8-10): unable to eat, unable to do any normal activities, excruciating pain         moderate  4. SWELLING: \"Is there any visible swelling of your face?\"      Under the jaw,  5. OTHER SYMPTOMS: \"Do you have any other symptoms?\" (e.g., fever)      Pain and fever, some swelling  6. PREGNANCY: \"Is there any chance you are pregnant?\" \"When was your last menstrual period?\"      NA    Protocols used: MOUTH SYMPTOMS-A-OH, TOOTHACHE-A-OH    "

## 2022-09-29 ENCOUNTER — TELEPHONE (OUTPATIENT)
Dept: FAMILY MEDICINE | Facility: CLINIC | Age: 62
End: 2022-09-29

## 2022-09-29 NOTE — TELEPHONE ENCOUNTER
Patient Quality Outreach    Patient is due for the following:   Diabetes -  A1C, Eye Exam and Microalbumin  Colon Cancer Screening  Physical Preventive Adult Physical    Next Steps:   Schedule a Adult Preventative    Type of outreach:    Phone, left message for patient/parent to call back.      Questions for provider review:    None     Meggan Panchal

## 2022-10-04 ENCOUNTER — LAB (OUTPATIENT)
Dept: LAB | Facility: CLINIC | Age: 62
End: 2022-10-04
Payer: COMMERCIAL

## 2022-10-04 DIAGNOSIS — Z12.11 SPECIAL SCREENING FOR MALIGNANT NEOPLASMS, COLON: ICD-10-CM

## 2022-10-04 PROCEDURE — 82274 ASSAY TEST FOR BLOOD FECAL: CPT

## 2022-10-04 NOTE — LETTER
October 11, 2022      Justin Stewart  [address] [address]        Dear ,    We are writing to inform you of your test results. This is a normal result.  If you continue with FIT testing for colon cancer screening you need to repeat this test yearly.  You can opt for colonoscopy screening at any point which would be every 10 years if normal and no family history of colon cancer.       Resulted Orders   Fecal colorectal cancer screen FIT   Result Value Ref Range    Occult Blood Screen FIT Negative Negative       If you have any questions or concerns, please call the clinic at the number listed above.       Sincerely,      Norah Harvey MD

## 2022-10-05 ENCOUNTER — ANCILLARY PROCEDURE (OUTPATIENT)
Dept: GENERAL RADIOLOGY | Facility: CLINIC | Age: 62
End: 2022-10-05
Attending: FAMILY MEDICINE
Payer: COMMERCIAL

## 2022-10-05 ENCOUNTER — OFFICE VISIT (OUTPATIENT)
Dept: FAMILY MEDICINE | Facility: CLINIC | Age: 62
End: 2022-10-05

## 2022-10-05 VITALS
WEIGHT: 220 LBS | OXYGEN SATURATION: 99 % | RESPIRATION RATE: 18 BRPM | HEART RATE: 86 BPM | HEIGHT: 71 IN | BODY MASS INDEX: 30.8 KG/M2 | SYSTOLIC BLOOD PRESSURE: 158 MMHG | TEMPERATURE: 97.2 F | DIASTOLIC BLOOD PRESSURE: 88 MMHG

## 2022-10-05 DIAGNOSIS — Z20.822 SUSPECTED COVID-19 VIRUS INFECTION: ICD-10-CM

## 2022-10-05 DIAGNOSIS — R05.1 ACUTE COUGH: ICD-10-CM

## 2022-10-05 DIAGNOSIS — R05.1 ACUTE COUGH: Primary | ICD-10-CM

## 2022-10-05 DIAGNOSIS — R00.2 PALPITATIONS: ICD-10-CM

## 2022-10-05 LAB
FLUAV AG SPEC QL IA: NEGATIVE
FLUBV AG SPEC QL IA: NEGATIVE

## 2022-10-05 PROCEDURE — 99214 OFFICE O/P EST MOD 30 MIN: CPT | Mod: CS | Performed by: FAMILY MEDICINE

## 2022-10-05 PROCEDURE — U0003 INFECTIOUS AGENT DETECTION BY NUCLEIC ACID (DNA OR RNA); SEVERE ACUTE RESPIRATORY SYNDROME CORONAVIRUS 2 (SARS-COV-2) (CORONAVIRUS DISEASE [COVID-19]), AMPLIFIED PROBE TECHNIQUE, MAKING USE OF HIGH THROUGHPUT TECHNOLOGIES AS DESCRIBED BY CMS-2020-01-R: HCPCS | Performed by: FAMILY MEDICINE

## 2022-10-05 PROCEDURE — 87804 INFLUENZA ASSAY W/OPTIC: CPT | Performed by: FAMILY MEDICINE

## 2022-10-05 PROCEDURE — 71046 X-RAY EXAM CHEST 2 VIEWS: CPT | Mod: TC | Performed by: RADIOLOGY

## 2022-10-05 PROCEDURE — 93000 ELECTROCARDIOGRAM COMPLETE: CPT | Performed by: FAMILY MEDICINE

## 2022-10-05 PROCEDURE — U0005 INFEC AGEN DETEC AMPLI PROBE: HCPCS | Performed by: FAMILY MEDICINE

## 2022-10-05 PROCEDURE — 87804 INFLUENZA ASSAY W/OPTIC: CPT | Mod: 59 | Performed by: FAMILY MEDICINE

## 2022-10-05 ASSESSMENT — ENCOUNTER SYMPTOMS
DIARRHEA: 0
ARTHRALGIAS: 1
NERVOUS/ANXIOUS: 0
DIZZINESS: 1
SORE THROAT: 1
JOINT SWELLING: 1
MYALGIAS: 1
ABDOMINAL PAIN: 0
NAUSEA: 0
CONSTIPATION: 1
EYE PAIN: 0
PARESTHESIAS: 1
HEADACHES: 0
FREQUENCY: 1
HEMATOCHEZIA: 0
DYSURIA: 0
WEAKNESS: 1
SHORTNESS OF BREATH: 1
FEVER: 0
PALPITATIONS: 1
HEMATURIA: 0
COUGH: 1
HEARTBURN: 0
CHILLS: 0

## 2022-10-05 ASSESSMENT — PATIENT HEALTH QUESTIONNAIRE - PHQ9
10. IF YOU CHECKED OFF ANY PROBLEMS, HOW DIFFICULT HAVE THESE PROBLEMS MADE IT FOR YOU TO DO YOUR WORK, TAKE CARE OF THINGS AT HOME, OR GET ALONG WITH OTHER PEOPLE: SOMEWHAT DIFFICULT
SUM OF ALL RESPONSES TO PHQ QUESTIONS 1-9: 10
SUM OF ALL RESPONSES TO PHQ QUESTIONS 1-9: 10

## 2022-10-05 ASSESSMENT — PAIN SCALES - GENERAL: PAINLEVEL: NO PAIN (0)

## 2022-10-05 NOTE — PROGRESS NOTES
Assessment & Plan     Acute cough  Given loss of smell and taste suspect COVID.  Greater than 10 days since symptoms started but did swab him today to confirm diagnosis.  He has had some left sided chest wall/pleuritic pain since his last COVID-vaccine about 10 months ago.  Did get a chest x-ray today which was unremarkable.  - XR Chest 2 Views; Future  - Influenza A & B Antigen - Clinic Collect    Palpitations  He also mentions several additional symptoms today that have been longstanding.  Started by addressing palpitations today.  He has a family history of atrial fibrillation.  Has noticed irregular heartbeat intermittently.  EKG today was unremarkable.  We will set him up for a Holter monitor.  - Adult Leadless EKG Monitor 3 to 7 Days; Future    Suspected COVID-19 virus infection  - Symptomatic; Unknown COVID-19 Virus (Coronavirus) by PCR Nose    Numerous longstanding symptoms including weight loss, palpitations, weakness, chest wall pain, rash.  Addressed some of this as above.  Discussed with him that this really warrants a longer visit than was scheduled for today.  Scheduled him for a follow-up with me next week to address some of the additional issues we did not get to today.         Depression Screening Follow Up    PHQ 10/5/2022   PHQ-9 Total Score 10   Q9: Thoughts of better off dead/self-harm past 2 weeks Several days   F/U: Thoughts of suicide or self-harm No   F/U: Safety concerns No           Return in about 1 week (around 10/12/2022) for re-check.    Norah Harvey MD  [facility]    Angel Anderson is a 62 year old, presenting for the following health issues:  Cough      HPI       Patient presents with Cough, Loss of taste and smell and reports having respiratory issues.    Skin Lesion  Onset/Duration: 4 months  Description  Location: hand/wrist  Color: brown and pink  Border description: raised, scaly  Character: round  Itching: moderate  Bleeding:   "No  Intensity:  moderate  Progression of Symptoms:  worsening  Accompanying signs and symptoms:   Bleeding: No  Scaling: YES  Excessive sun exposure/tanning: unknown  Sunscreen used: unknown    Review of Systems         Objective    BP (!) 158/88   Pulse 86   Temp 97.2  F (36.2  C) (Tympanic)   Resp 18   Ht 1.797 m (5' 10.75\")   Wt 114.3 kg (252 lb)   SpO2 99%   BMI 35.40 kg/m    Body mass index is 35.4 kg/m .  Physical Exam   GENERAL: healthy, alert and no distress  NECK: no adenopathy, no asymmetry, masses, or scars and thyroid normal to palpation  RESP: lungs clear to auscultation - no rales, rhonchi or wheezes  CV: regular rate and rhythm, normal S1 S2, no S3 or S4, no murmur, click or rub, no peripheral edema and peripheral pulses strong  MS: no gross musculoskeletal defects noted, no edema  SKIN: no suspicious lesions or rashes  NEURO: Normal strength and tone, mentation intact and speech normal  PSYCH: mentation appears normal, affect normal/bright        Recent Results (from the past 744 hour(s))   XR Chest 2 Views    Narrative    CHEST TWO VIEWS  10/5/2022 12:01 PM     HISTORY: Acute cough.    COMPARISON: None.       Impression    IMPRESSION:  There are no acute infiltrates. The cardiac silhouette is  not enlarged. Pulmonary vasculature is unremarkable.     RADHIKA CALZADA MD         SYSTEM ID:  [id number]       EKG: (read and interpreted by me) NSR no acute ST-T changes.             Answers for HPI/ROS submitted by the patient on 10/5/2022  If you checked off any problems, how difficult have these problems made it for you to do your work, take care of things at home, or get along with other people?: Somewhat difficult  PHQ9 TOTAL SCORE: 10      "

## 2022-10-05 NOTE — PROGRESS NOTES
Answers for HPI/ROS submitted by the patient on 10/5/2022  If you checked off any problems, how difficult have these problems made it for you to do your work, take care of things at home, or get along with other people?: Somewhat difficult  PHQ9 TOTAL SCORE: 10      {PROVIDER CHARTING PREFERENCE:959225}    Subjective   Justin is a 62 year old{ACCOMPANIED BY STATEMENT (Optional):977617}, presenting for the following health issues:  Cough      Cough  Associated symptoms include chest pain, sore throat, myalgias and shortness of breath. Pertinent negatives include no chills, no ear pain and no headaches.   Healthy Habits:     Getting at least 3 servings of Calcium per day:  Yes    Bi-annual eye exam:  NO    Dental care twice a year:  NO    Sleep apnea or symptoms of sleep apnea:  None    Diet:  Low salt, Diabetic, Carbohydrate counting and Gluten-free/reduced    Frequency of exercise:  4-5 days/week    Duration of exercise:  15-30 minutes    Taking medications regularly:  Yes    Medication side effects:  None    PHQ-2 Total Score: 4    Additional concerns today:  No     {ALERT  Recent PHQ-9 score indicates suicidal ideations :529442}{Provider Documentation  Link to C-SSRS ([facility]) Flowsheet :339071}  {SUPERLIST (Optional):887799}  {additonal problems for provider to add (Optional):417039}    Review of Systems   Constitutional: Negative for chills and fever.   HENT: Positive for hearing loss and sore throat. Negative for congestion and ear pain.    Eyes: Negative for pain and visual disturbance.   Respiratory: Positive for cough and shortness of breath.    Cardiovascular: Positive for chest pain and palpitations. Negative for peripheral edema.   Gastrointestinal: Positive for constipation. Negative for abdominal pain, diarrhea, heartburn, hematochezia and nausea.   Genitourinary: Positive for frequency and urgency. Negative for dysuria, genital sores, hematuria, impotence and penile discharge.   Musculoskeletal:  "Positive for arthralgias, joint swelling and myalgias.   Skin: Positive for rash.   Neurological: Positive for dizziness, weakness and paresthesias. Negative for headaches.   Psychiatric/Behavioral: Positive for mood changes. The patient is not nervous/anxious.       {ROS COMP (Optional):673368}      Objective    BP (!) 158/88   Pulse 86   Temp 97.2  F (36.2  C) (Tympanic)   Resp 18   Ht 1.797 m (5' 10.75\")   Wt 114.3 kg (252 lb)   SpO2 99%   BMI 35.40 kg/m    Body mass index is 35.4 kg/m .  Physical Exam   {Exam List (Optional):494886}    {Diagnostic Test Results (Optional):327517}    {AMBULATORY ATTESTATION (Optional):167408}            "

## 2022-10-05 NOTE — NURSING NOTE
"Initial BP (!) 158/88   Pulse 86   Temp 97.2  F (36.2  C) (Tympanic)   Resp 18   Ht 1.797 m (5' 10.75\")   Wt 114.3 kg (252 lb)   SpO2 99%   BMI 35.40 kg/m   Estimated body mass index is 35.4 kg/m  as calculated from the following:    Height as of this encounter: 1.797 m (5' 10.75\").    Weight as of this encounter: 114.3 kg (252 lb). .      "

## 2022-10-05 NOTE — RESULT ENCOUNTER NOTE
Please call the patient with the results. Notify that flu swab is negative.  COVID swab still pending.

## 2022-10-06 LAB — SARS-COV-2 RNA RESP QL NAA+PROBE: NEGATIVE

## 2022-10-06 NOTE — RESULT ENCOUNTER NOTE
Please call the patient with the results. Notify that COVID swab was negative.  Since symptoms started a few weeks ago possible that it was COVID and that the PCR is negative because he is past the acute infectious phase of COVID.  Other possibility is that this is a nonspecific viral upper respiratory infection.  Based on exam yesterday no indication for antibiotics.  Symptomatic cares.  I will follow-up with him when I see him back for his physical.

## 2022-10-07 DIAGNOSIS — Z12.11 SPECIAL SCREENING FOR MALIGNANT NEOPLASMS, COLON: Primary | ICD-10-CM

## 2022-10-07 LAB — HEMOCCULT STL QL IA: NEGATIVE

## 2022-10-11 NOTE — RESULT ENCOUNTER NOTE
Please mail letter: This is a normal result.  If you continue with FIT testing for colon cancer screening you need to repeat this test yearly.  You can opt for colonoscopy screening at any point which would be every 10 years if normal and no family history of colon cancer.

## 2022-10-27 ENCOUNTER — VIRTUAL VISIT (OUTPATIENT)
Dept: FAMILY MEDICINE | Facility: CLINIC | Age: 62
End: 2022-10-27
Payer: COMMERCIAL

## 2022-10-27 ENCOUNTER — TELEPHONE (OUTPATIENT)
Dept: FAMILY MEDICINE | Facility: CLINIC | Age: 62
End: 2022-10-27

## 2022-10-27 DIAGNOSIS — R63.4 WEIGHT LOSS: Primary | ICD-10-CM

## 2022-10-27 DIAGNOSIS — E11.42 TYPE 2 DIABETES MELLITUS WITH DIABETIC POLYNEUROPATHY, WITHOUT LONG-TERM CURRENT USE OF INSULIN (H): ICD-10-CM

## 2022-10-27 PROCEDURE — 99214 OFFICE O/P EST MOD 30 MIN: CPT | Mod: TEL | Performed by: FAMILY MEDICINE

## 2022-10-27 NOTE — NURSING NOTE
"Initial There were no vitals taken for this visit. Estimated body mass index is 30.9 kg/m  as calculated from the following:    Height as of 10/5/22: 1.797 m (5' 10.75\").    Weight as of 10/5/22: 99.8 kg (220 lb). .      "

## 2022-10-27 NOTE — PROGRESS NOTES
Justin is a 62 [id number]- old who is being evaluated via a billable telephone visit.      What phone number would you like to be contacted at? [phone]  How would you like to obtain your AVS? MyChart    Assessment & Plan     Weight loss  Unclear etiology. Will have him get some labs and CT chest/abd/pelvis and the see me in clinic next week for exam and to discuss results anesthesia potential further work-up.  - CBC with Platelets & Differential; Future  - Comprehensive metabolic panel; Future  - TSH; Future  - Lipid panel reflex to direct LDL Fasting; Future  - Prostate Specific Antigen Screen; Future  - CT Chest/Abdomen/Pelvis w Contrast; Future    Type 2 diabetes mellitus with diabetic polyneuropathy, without long-term current use of insulin (H)  Due for A1C. Will also discuss next week.  - Adult Eye  Referral; Future  - HEMOGLOBIN A1C; Future  - Albumin Random Urine Quantitative with Creat Ratio; Future                 Return in about 1 week (around 11/3/2022) for re-check.    Norah Harvey MD  [facility]   Justin is a 62 year old, presenting for the following health issues:  Weight Loss      HPI     Patient presents with concern of weight loss.  He reports that he has lost 20 more pounds since his last visit on 10/05/2022.    He also reported that his blood sugars have been off the chart (Too high to register on his glucometer    I scheduled patient for an in person visit next Wednesday 11/02/2022 to discuss his diabetes.        Review of Systems   .sipro       Objective           Vitals:  No vitals were obtained today due to virtual visit.    Physical Exam   healthy, alert and no distress  PSYCH: Alert and oriented times 3; coherent speech, normal   rate and volume, able to articulate logical thoughts, able   to abstract reason, no tangential thoughts, no hallucinations   or delusions  His affect is normal  RESP: No cough, no audible wheezing, able to  talk in full sentences  Remainder of exam unable to be completed due to telephone visits    Lab Results   Component Value Date    A1C 7.5 04/06/2022    A1C 8.1 07/23/2021                  Phone call duration: 18 minutes

## 2022-11-01 ENCOUNTER — HOSPITAL ENCOUNTER (OUTPATIENT)
Dept: CT IMAGING | Facility: CLINIC | Age: 62
Discharge: HOME | End: 2022-11-01
Attending: FAMILY MEDICINE | Admitting: FAMILY MEDICINE
Payer: COMMERCIAL

## 2022-11-01 ENCOUNTER — LAB (OUTPATIENT)
Dept: LAB | Facility: CLINIC | Age: 62
End: 2022-11-01
Payer: COMMERCIAL

## 2022-11-01 DIAGNOSIS — R63.4 WEIGHT LOSS: ICD-10-CM

## 2022-11-01 DIAGNOSIS — E11.42 TYPE 2 DIABETES MELLITUS WITH DIABETIC POLYNEUROPATHY, WITHOUT LONG-TERM CURRENT USE OF INSULIN (H): ICD-10-CM

## 2022-11-01 LAB
ALBUMIN SERPL BCG-MCNC: 4 G/DL (ref 3.5–5.2)
ALP SERPL-CCNC: 89 U/L (ref 40–129)
ALT SERPL W P-5'-P-CCNC: 16 U/L (ref 10–50)
ANION GAP SERPL CALCULATED.3IONS-SCNC: 19 MMOL/L (ref 7–15)
AST SERPL W P-5'-P-CCNC: 15 U/L (ref 10–50)
BASOPHILS # BLD AUTO: 0 10E3/UL (ref 0–0.2)
BASOPHILS NFR BLD AUTO: 0 %
BILIRUB SERPL-MCNC: 0.5 MG/DL
BUN SERPL-MCNC: 18.3 MG/DL (ref 8–23)
CALCIUM SERPL-MCNC: 9.8 MG/DL (ref 8.8–10.2)
CHLORIDE SERPL-SCNC: 95 MMOL/L (ref 98–107)
CHOLEST SERPL-MCNC: 270 MG/DL
CREAT BLD-MCNC: 0.8 MG/DL (ref 0.7–1.3)
CREAT SERPL-MCNC: 0.81 MG/DL (ref 0.67–1.17)
CREAT UR-MCNC: 23.8 MG/DL
DEPRECATED HCO3 PLAS-SCNC: 17 MMOL/L (ref 22–29)
EOSINOPHIL # BLD AUTO: 0.2 10E3/UL (ref 0–0.7)
EOSINOPHIL NFR BLD AUTO: 3 %
ERYTHROCYTE [DISTWIDTH] IN BLOOD BY AUTOMATED COUNT: 12.8 % (ref 10–15)
GFR SERPL CREATININE-BSD FRML MDRD: >60 ML/MIN/1.73M2
GFR SERPL CREATININE-BSD FRML MDRD: >90 ML/MIN/1.73M2
GLUCOSE SERPL-MCNC: 458 MG/DL (ref 70–99)
HBA1C MFR BLD: 14.2 % (ref 0–5.6)
HCT VFR BLD AUTO: 39.8 % (ref 40–53)
HDLC SERPL-MCNC: 35 MG/DL
HGB BLD-MCNC: 13.6 G/DL (ref 13.3–17.7)
LDLC SERPL CALC-MCNC: ABNORMAL MG/DL
LDLC SERPL DIRECT ASSAY-MCNC: 140 MG/DL
LYMPHOCYTES # BLD AUTO: 1.5 10E3/UL (ref 0.8–5.3)
LYMPHOCYTES NFR BLD AUTO: 25 %
MCH RBC QN AUTO: 30.7 PG (ref 26.5–33)
MCHC RBC AUTO-ENTMCNC: 34.2 G/DL (ref 31.5–36.5)
MCV RBC AUTO: 90 FL (ref 78–100)
MICROALBUMIN UR-MCNC: <12 MG/L
MICROALBUMIN/CREAT UR: NORMAL MG/G{CREAT}
MONOCYTES # BLD AUTO: 0.5 10E3/UL (ref 0–1.3)
MONOCYTES NFR BLD AUTO: 8 %
NEUTROPHILS # BLD AUTO: 3.9 10E3/UL (ref 1.6–8.3)
NEUTROPHILS NFR BLD AUTO: 64 %
NONHDLC SERPL-MCNC: 235 MG/DL
PLATELET # BLD AUTO: 156 10E3/UL (ref 150–450)
POTASSIUM SERPL-SCNC: 4.8 MMOL/L (ref 3.4–5.3)
PROT SERPL-MCNC: 6.8 G/DL (ref 6.4–8.3)
PSA SERPL-MCNC: 4.27 NG/ML (ref 0–4.5)
RBC # BLD AUTO: 4.43 10E6/UL (ref 4.4–5.9)
SODIUM SERPL-SCNC: 131 MMOL/L (ref 136–145)
TRIGL SERPL-MCNC: 515 MG/DL
TSH SERPL DL<=0.005 MIU/L-ACNC: 1.12 UIU/ML (ref 0.3–4.2)
WBC # BLD AUTO: 6 10E3/UL (ref 4–11)

## 2022-11-01 PROCEDURE — 250N000011 HC RX IP 250 OP 636: Performed by: RADIOLOGY

## 2022-11-01 PROCEDURE — 36415 COLL VENOUS BLD VENIPUNCTURE: CPT

## 2022-11-01 PROCEDURE — 74177 CT ABD & PELVIS W/CONTRAST: CPT

## 2022-11-01 PROCEDURE — 82043 UR ALBUMIN QUANTITATIVE: CPT

## 2022-11-01 PROCEDURE — 82565 ASSAY OF CREATININE: CPT

## 2022-11-01 PROCEDURE — 80061 LIPID PANEL: CPT

## 2022-11-01 PROCEDURE — 83036 HEMOGLOBIN GLYCOSYLATED A1C: CPT

## 2022-11-01 PROCEDURE — 250N000009 HC RX 250: Performed by: RADIOLOGY

## 2022-11-01 PROCEDURE — G0103 PSA SCREENING: HCPCS

## 2022-11-01 PROCEDURE — 83721 ASSAY OF BLOOD LIPOPROTEIN: CPT | Mod: 59

## 2022-11-01 RX ORDER — IOPAMIDOL 755 MG/ML
90 INJECTION, SOLUTION INTRAVASCULAR ONCE
Status: COMPLETED | OUTPATIENT
Start: 2022-11-01 | End: 2022-11-01

## 2022-11-01 RX ADMIN — IOPAMIDOL 90 ML: 755 INJECTION, SOLUTION INTRAVENOUS at 14:43

## 2022-11-01 RX ADMIN — SODIUM CHLORIDE 67 ML: 9 INJECTION, SOLUTION INTRAVENOUS at 14:43

## 2022-11-02 ENCOUNTER — OFFICE VISIT (OUTPATIENT)
Dept: FAMILY MEDICINE | Facility: CLINIC | Age: 62
End: 2022-11-02
Payer: COMMERCIAL

## 2022-11-02 ENCOUNTER — HOSPITAL ENCOUNTER (INPATIENT)
Facility: CLINIC | Age: 62
LOS: 2 days | Discharge: HOME | DRG: 637 | End: 2022-11-04
Attending: EMERGENCY MEDICINE | Admitting: INTERNAL MEDICINE
Payer: COMMERCIAL

## 2022-11-02 VITALS
TEMPERATURE: 96.8 F | SYSTOLIC BLOOD PRESSURE: 128 MMHG | HEIGHT: 71 IN | RESPIRATION RATE: 16 BRPM | OXYGEN SATURATION: 99 % | HEART RATE: 78 BPM | DIASTOLIC BLOOD PRESSURE: 84 MMHG | BODY MASS INDEX: 29.26 KG/M2 | WEIGHT: 209 LBS

## 2022-11-02 DIAGNOSIS — E11.10 DIABETIC KETOACIDOSIS WITHOUT COMA ASSOCIATED WITH TYPE 2 DIABETES MELLITUS (H): ICD-10-CM

## 2022-11-02 DIAGNOSIS — E11.42 TYPE 2 DIABETES MELLITUS WITH DIABETIC POLYNEUROPATHY, WITHOUT LONG-TERM CURRENT USE OF INSULIN (H): Primary | ICD-10-CM

## 2022-11-02 DIAGNOSIS — E11.65 INADEQUATELY CONTROLLED DIABETES MELLITUS (H): ICD-10-CM

## 2022-11-02 DIAGNOSIS — E11.10 DIABETIC KETOACIDOSIS WITHOUT COMA ASSOCIATED WITH TYPE 2 DIABETES MELLITUS (H): Primary | ICD-10-CM

## 2022-11-02 DIAGNOSIS — E87.29 INCREASED ANION GAP METABOLIC ACIDOSIS: ICD-10-CM

## 2022-11-02 DIAGNOSIS — E11.42 TYPE 2 DIABETES MELLITUS WITH DIABETIC POLYNEUROPATHY, WITHOUT LONG-TERM CURRENT USE OF INSULIN (H): ICD-10-CM

## 2022-11-02 DIAGNOSIS — Z11.52 ENCOUNTER FOR SCREENING LABORATORY TESTING FOR SEVERE ACUTE RESPIRATORY SYNDROME CORONAVIRUS 2 (SARS-COV-2): ICD-10-CM

## 2022-11-02 DIAGNOSIS — R73.9 HYPERGLYCEMIA: ICD-10-CM

## 2022-11-02 DIAGNOSIS — E11.10 KETOSIS DUE TO DIABETES (H): ICD-10-CM

## 2022-11-02 LAB
ALBUMIN UR-MCNC: NEGATIVE MG/DL
ANION GAP SERPL CALCULATED.3IONS-SCNC: 18 MMOL/L (ref 7–15)
APPEARANCE UR: CLEAR
BASE EXCESS BLDV CALC-SCNC: -3.6 MMOL/L (ref -7.7–1.9)
BASOPHILS # BLD AUTO: 0 10E3/UL (ref 0–0.2)
BASOPHILS NFR BLD AUTO: 1 %
BILIRUB UR QL STRIP: NEGATIVE
BUN SERPL-MCNC: 18.7 MG/DL (ref 8–23)
CALCIUM SERPL-MCNC: 10.2 MG/DL (ref 8.8–10.2)
CHLORIDE SERPL-SCNC: 98 MMOL/L (ref 98–107)
COLOR UR AUTO: ABNORMAL
CREAT SERPL-MCNC: 0.83 MG/DL (ref 0.67–1.17)
DEPRECATED HCO3 PLAS-SCNC: 21 MMOL/L (ref 22–29)
EOSINOPHIL # BLD AUTO: 0.3 10E3/UL (ref 0–0.7)
EOSINOPHIL NFR BLD AUTO: 4 %
ERYTHROCYTE [DISTWIDTH] IN BLOOD BY AUTOMATED COUNT: 13.2 % (ref 10–15)
GFR SERPL CREATININE-BSD FRML MDRD: >90 ML/MIN/1.73M2
GLUCOSE BLDC GLUCOMTR-MCNC: 156 MG/DL (ref 70–99)
GLUCOSE BLDC GLUCOMTR-MCNC: 257 MG/DL (ref 70–99)
GLUCOSE BLDC GLUCOMTR-MCNC: 333 MG/DL (ref 70–99)
GLUCOSE SERPL-MCNC: 326 MG/DL (ref 70–99)
GLUCOSE UR STRIP-MCNC: 1000 MG/DL
HCO3 BLDV-SCNC: 23 MMOL/L (ref 21–28)
HCT VFR BLD AUTO: 43.2 % (ref 40–53)
HGB BLD-MCNC: 14.8 G/DL (ref 13.3–17.7)
HGB UR QL STRIP: NEGATIVE
IMM GRANULOCYTES # BLD: 0 10E3/UL
IMM GRANULOCYTES NFR BLD: 0 %
KETONES BLD-SCNC: 5.1 MMOL/L (ref 0–0.6)
KETONES UR STRIP-MCNC: 80 MG/DL
LEUKOCYTE ESTERASE UR QL STRIP: NEGATIVE
LIPASE SERPL-CCNC: 33 U/L (ref 13–60)
LYMPHOCYTES # BLD AUTO: 2.1 10E3/UL (ref 0.8–5.3)
LYMPHOCYTES NFR BLD AUTO: 35 %
MCH RBC QN AUTO: 30.2 PG (ref 26.5–33)
MCHC RBC AUTO-ENTMCNC: 34.3 G/DL (ref 31.5–36.5)
MCV RBC AUTO: 88 FL (ref 78–100)
MONOCYTES # BLD AUTO: 0.5 10E3/UL (ref 0–1.3)
MONOCYTES NFR BLD AUTO: 8 %
MUCOUS THREADS #/AREA URNS LPF: PRESENT /LPF
NEUTROPHILS # BLD AUTO: 3.1 10E3/UL (ref 1.6–8.3)
NEUTROPHILS NFR BLD AUTO: 52 %
NITRATE UR QL: NEGATIVE
NRBC # BLD AUTO: 0 10E3/UL
NRBC BLD AUTO-RTO: 0 /100
O2/TOTAL GAS SETTING VFR VENT: 0 %
PCO2 BLDV: 45 MM HG (ref 40–50)
PH BLDV: 7.32 [PH] (ref 7.32–7.43)
PH UR STRIP: 5 [PH] (ref 5–7)
PLATELET # BLD AUTO: 178 10E3/UL (ref 150–450)
PO2 BLDV: 26 MM HG (ref 25–47)
POTASSIUM SERPL-SCNC: 4.1 MMOL/L (ref 3.4–5.3)
RBC # BLD AUTO: 4.9 10E6/UL (ref 4.4–5.9)
RBC URINE: 1 /HPF
SARS-COV-2 RNA RESP QL NAA+PROBE: NEGATIVE
SODIUM SERPL-SCNC: 137 MMOL/L (ref 136–145)
SP GR UR STRIP: 1.03 (ref 1–1.03)
SQUAMOUS EPITHELIAL: <1 /HPF
TROPONIN T SERPL HS-MCNC: 9 NG/L
UROBILINOGEN UR STRIP-MCNC: NORMAL MG/DL
WBC # BLD AUTO: 5.9 10E3/UL (ref 4–11)
WBC URINE: <1 /HPF

## 2022-11-02 PROCEDURE — 81001 URINALYSIS AUTO W/SCOPE: CPT | Performed by: EMERGENCY MEDICINE

## 2022-11-02 PROCEDURE — 120N000001 HC R&B MED SURG/OB

## 2022-11-02 PROCEDURE — 82803 BLOOD GASES ANY COMBINATION: CPT | Performed by: EMERGENCY MEDICINE

## 2022-11-02 PROCEDURE — 96360 HYDRATION IV INFUSION INIT: CPT | Mod: 59 | Performed by: EMERGENCY MEDICINE

## 2022-11-02 PROCEDURE — 36415 COLL VENOUS BLD VENIPUNCTURE: CPT | Performed by: EMERGENCY MEDICINE

## 2022-11-02 PROCEDURE — 250N000012 HC RX MED GY IP 250 OP 636 PS 637: Performed by: EMERGENCY MEDICINE

## 2022-11-02 PROCEDURE — 99223 1ST HOSP IP/OBS HIGH 75: CPT | Mod: AI | Performed by: PHYSICIAN ASSISTANT

## 2022-11-02 PROCEDURE — 250N000011 HC RX IP 250 OP 636: Performed by: EMERGENCY MEDICINE

## 2022-11-02 PROCEDURE — C9803 HOPD COVID-19 SPEC COLLECT: HCPCS | Performed by: EMERGENCY MEDICINE

## 2022-11-02 PROCEDURE — 85025 COMPLETE CBC W/AUTO DIFF WBC: CPT | Performed by: EMERGENCY MEDICINE

## 2022-11-02 PROCEDURE — 258N000002 HC RX IP 258 OP 250: Performed by: EMERGENCY MEDICINE

## 2022-11-02 PROCEDURE — 83690 ASSAY OF LIPASE: CPT | Performed by: EMERGENCY MEDICINE

## 2022-11-02 PROCEDURE — 96361 HYDRATE IV INFUSION ADD-ON: CPT | Performed by: EMERGENCY MEDICINE

## 2022-11-02 PROCEDURE — 87635 SARS-COV-2 COVID-19 AMP PRB: CPT | Performed by: EMERGENCY MEDICINE

## 2022-11-02 PROCEDURE — 258N000003 HC RX IP 258 OP 636: Performed by: EMERGENCY MEDICINE

## 2022-11-02 PROCEDURE — 93010 ELECTROCARDIOGRAM REPORT: CPT | Performed by: EMERGENCY MEDICINE

## 2022-11-02 PROCEDURE — 93005 ELECTROCARDIOGRAM TRACING: CPT | Performed by: EMERGENCY MEDICINE

## 2022-11-02 PROCEDURE — 99285 EMERGENCY DEPT VISIT HI MDM: CPT | Mod: 25 | Performed by: EMERGENCY MEDICINE

## 2022-11-02 PROCEDURE — 96372 THER/PROPH/DIAG INJ SC/IM: CPT | Performed by: EMERGENCY MEDICINE

## 2022-11-02 PROCEDURE — 96365 THER/PROPH/DIAG IV INF INIT: CPT | Performed by: EMERGENCY MEDICINE

## 2022-11-02 PROCEDURE — 82010 KETONE BODYS QUAN: CPT | Performed by: EMERGENCY MEDICINE

## 2022-11-02 PROCEDURE — 99214 OFFICE O/P EST MOD 30 MIN: CPT | Performed by: FAMILY MEDICINE

## 2022-11-02 PROCEDURE — 84484 ASSAY OF TROPONIN QUANT: CPT | Performed by: EMERGENCY MEDICINE

## 2022-11-02 PROCEDURE — 82310 ASSAY OF CALCIUM: CPT | Performed by: EMERGENCY MEDICINE

## 2022-11-02 RX ORDER — ONDANSETRON 2 MG/ML
4 INJECTION INTRAMUSCULAR; INTRAVENOUS EVERY 6 HOURS PRN
Status: DISCONTINUED | OUTPATIENT
Start: 2022-11-02 | End: 2022-11-03

## 2022-11-02 RX ORDER — SODIUM CHLORIDE 450 MG/100ML
1000 INJECTION, SOLUTION INTRAVENOUS CONTINUOUS
Status: DISCONTINUED | OUTPATIENT
Start: 2022-11-02 | End: 2022-11-03

## 2022-11-02 RX ORDER — DEXTROSE MONOHYDRATE 25 G/50ML
25-50 INJECTION, SOLUTION INTRAVENOUS
Status: DISCONTINUED | OUTPATIENT
Start: 2022-11-02 | End: 2022-11-02

## 2022-11-02 RX ORDER — ACETAMINOPHEN 325 MG/1
650 TABLET ORAL EVERY 4 HOURS PRN
Status: DISCONTINUED | OUTPATIENT
Start: 2022-11-02 | End: 2022-11-03

## 2022-11-02 RX ORDER — POTASSIUM CHLORIDE 7.45 MG/ML
10 INJECTION INTRAVENOUS ONCE
Status: DISCONTINUED | OUTPATIENT
Start: 2022-11-02 | End: 2022-11-02

## 2022-11-02 RX ORDER — ONDANSETRON 4 MG/1
4 TABLET, ORALLY DISINTEGRATING ORAL EVERY 6 HOURS PRN
Status: DISCONTINUED | OUTPATIENT
Start: 2022-11-02 | End: 2022-11-03

## 2022-11-02 RX ADMIN — POTASSIUM CHLORIDE 10 MEQ: 7.46 INJECTION, SOLUTION INTRAVENOUS at 18:33

## 2022-11-02 RX ADMIN — INSULIN ASPART 10 UNITS: 100 INJECTION, SOLUTION INTRAVENOUS; SUBCUTANEOUS at 18:55

## 2022-11-02 RX ADMIN — SODIUM CHLORIDE 1000 ML: 9 INJECTION, SOLUTION INTRAVENOUS at 18:12

## 2022-11-02 RX ADMIN — SODIUM CHLORIDE, POTASSIUM CHLORIDE, SODIUM LACTATE AND CALCIUM CHLORIDE 1000 ML: 600; 310; 30; 20 INJECTION, SOLUTION INTRAVENOUS at 19:00

## 2022-11-02 RX ADMIN — SODIUM CHLORIDE 1000 ML: 4.5 INJECTION, SOLUTION INTRAVENOUS at 20:06

## 2022-11-02 RX ADMIN — INSULIN GLARGINE 20 UNITS: 100 INJECTION, SOLUTION SUBCUTANEOUS at 20:47

## 2022-11-02 ASSESSMENT — ACTIVITIES OF DAILY LIVING (ADL)
DRESSING/BATHING_DIFFICULTY: NO
ADLS_ACUITY_SCORE: 35
DOING_ERRANDS_INDEPENDENTLY_DIFFICULTY: NO
ADLS_ACUITY_SCORE: 33
TOILETING_ISSUES: NO
NUMBER_OF_TIMES_PATIENT_HAS_FALLEN_WITHIN_LAST_SIX_MONTHS: 3
ADLS_ACUITY_SCORE: 35
DIFFICULTY_EATING/SWALLOWING: NO
WALKING_OR_CLIMBING_STAIRS_DIFFICULTY: NO
CONCENTRATING,_REMEMBERING_OR_MAKING_DECISIONS_DIFFICULTY: NO
ADLS_ACUITY_SCORE: 22
CHANGE_IN_FUNCTIONAL_STATUS_SINCE_ONSET_OF_CURRENT_ILLNESS/INJURY: NO
WEAR_GLASSES_OR_BLIND: YES
FALL_HISTORY_WITHIN_LAST_SIX_MONTHS: YES

## 2022-11-02 ASSESSMENT — PAIN SCALES - GENERAL: PAINLEVEL: NO PAIN (0)

## 2022-11-02 NOTE — ED NOTES
"Pt states that he has been feeling \"off\" for \"a long time\". Symptoms include: dizziness, increased thirst, and weight loss...\"the typical hyperglycemia symptoms\". Pt states that he has lost up to 30lb in 5 weeks.     Pt currently denies symptoms. Blood sugar check at this time: 333  "

## 2022-11-02 NOTE — PROGRESS NOTES
Assessment & Plan     Diabetic ketoacidosis without coma associated with type 2 diabetes mellitus (H)  He has had a 40 pound weight loss in the last 2 months.  In the process of doing work-up labs came back showing mild-moderate DKA.  Largely he still feels well but recent electrolytes show sodium of 131 bicarb of 17 anion gap of 19 and blood sugars of 458.  He will need to start insulin but I think doing so as an outpatient could be risky as he will need close monitoring of electrolytes at a minimum and my concern would be for acute decompensation.  I would also wonder about the possibility of this being an adult onset type 1 diabetes given development of DKA in the setting of past type 2 diabetes.  Discussed this with him.  He will head to [facility].  I did call ahead for signout.  Talked to the charge nurse.  Once stabilized he will need close outpatient follow-up including consult with the diabetic nurse educator.  I did go ahead and already placed a consult order given that this will need to happen either way.    I do suspect that this is likely the cause of his weight loss.  We did also get chest abdomen pelvis CT which was largely unremarkable.  Did see some pulmonary nodules which did not look acutely worrisome and we will follow-up on this at a later date.    Type 2 diabetes mellitus with diabetic polyneuropathy, without long-term current use of insulin (H)                   No follow-ups on file.    Norah Harvey MD  [facility]    Angel Anderson is a 62 year old accompanied by his spouse, presenting for the following health issues:  Diabetes      History of Present Illness       Diabetes:   He presents for follow up of diabetes.  He is checking home blood glucose two times daily. He checks blood glucose after meals.  Blood glucose is sometimes over 200 and never under 70. He is aware of hypoglycemia symptoms including shakiness, dizziness, weakness and  "confusion. He is concerned about other.  He is having excessive thirst and weight loss. The patient has not had a diabetic eye exam in the last 12 months.         He eats 2-3 servings of fruits and vegetables daily.He consumes 0 sweetened beverage(s) daily.He exercises with enough effort to increase his heart rate 20 to 29 minutes per day.  He exercises with enough effort to increase his heart rate 7 days per week.   He is taking medications regularly.             Review of Systems   Constitutional, neuro, ENT, endocrine, pulmonary, cardiac, gastrointestinal, genitourinary, musculoskeletal, integument and psychiatric systems are negative, except as otherwise noted.       Objective    /84   Pulse 78   Temp 96.8  F (36  C) (Tympanic)   Resp 16   Ht 1.797 m (5' 10.75\")   Wt 94.8 kg (209 lb)   SpO2 99%   BMI 29.36 kg/m    Body mass index is 29.36 kg/m .  Physical Exam   GENERAL: Pleasant, well appearing male.  CV: RRR, no murmurs, rubs or gallops.  LUNGS: CTAB, normal effort.  ABDOMEN: Soft, nondistended, nontender, no hepatosplenomegaly. No palpable masses.     Component      Latest Ref Rng & Units 11/1/2022   WBC      4.0 - 11.0 10e3/uL 6.0   RBC Count      4.40 - 5.90 10e6/uL 4.43   Hemoglobin      13.3 - 17.7 g/dL 13.6   Hematocrit      40.0 - 53.0 % 39.8 (L)   MCV      78 - 100 fL 90   MCH      26.5 - 33.0 pg 30.7   MCHC      31.5 - 36.5 g/dL 34.2   RDW      10.0 - 15.0 % 12.8   Platelet Count      150 - 450 10e3/uL 156   % Neutrophils      % 64   % Lymphocytes      % 25   % Monocytes      % 8   % Eosinophils      % 3   % Basophils      % 0   Absolute Neutrophils      1.6 - 8.3 10e3/uL 3.9   Absolute Lymphocytes      0.8 - 5.3 10e3/uL 1.5   Absolute Monocytes      0.0 - 1.3 10e3/uL 0.5   Absolute Eosinophils      0.0 - 0.7 10e3/uL 0.2   Absolute Basophils      0.0 - 0.2 10e3/uL 0.0   Sodium      136 - 145 mmol/L 131 (L)   Potassium      3.4 - 5.3 mmol/L 4.8   Chloride      98 - 107 mmol/L 95 (L) "   Carbon Dioxide (CO2)      22 - 29 mmol/L 17 (L)   Anion Gap      7 - 15 mmol/L 19 (H)   Urea Nitrogen      8.0 - 23.0 mg/dL 18.3   Creatinine      0.67 - 1.17 mg/dL 0.81   Calcium      8.8 - 10.2 mg/dL 9.8   Glucose      70 - 99 mg/dL 458 (H)   Alkaline Phosphatase      40 - 129 U/L 89   AST      10 - 50 U/L 15   ALT      10 - 50 U/L 16   Protein Total      6.4 - 8.3 g/dL 6.8   Albumin      3.5 - 5.2 g/dL 4.0   Bilirubin Total      <=1.2 mg/dL 0.5   GFR Estimate      >60 mL/min/1.73m2 >90   Cholesterol      <200 mg/dL 270 (H)   Triglycerides      <150 mg/dL 515 (H)   HDL Cholesterol      >=40 mg/dL 35 (L)   LDL Cholesterol Calculated          Non HDL Cholesterol      <130 mg/dL 235 (H)   Albumin Urine mg/L      mg/L <12.0   Albumin Urine mg/g Cr          Creatinine Urine      mg/dL 23.8   Creatinine POCT      0.7 - 1.3 mg/dL 0.8   GFR, ESTIMATED POCT      >60 mL/min/1.73m2 >60   Hemoglobin A1C      0.0 - 5.6 % 14.2 (H)   TSH      0.30 - 4.20 uIU/mL 1.12   PSA Tumor Marker      0.00 - 4.50 ng/mL 4.27   LDL Cholesterol Direct      <100 mg/dL 140 (H)

## 2022-11-02 NOTE — NURSING NOTE
"Initial /84   Pulse 78   Temp 96.8  F (36  C) (Tympanic)   Resp 16   Ht 1.797 m (5' 10.75\")   Wt 94.8 kg (209 lb)   SpO2 99%   BMI 29.36 kg/m   Estimated body mass index is 29.36 kg/m  as calculated from the following:    Height as of this encounter: 1.797 m (5' 10.75\").    Weight as of this encounter: 94.8 kg (209 lb). .      "

## 2022-11-02 NOTE — H&P
[facility]    History and Physical - Hospitalist Service       Date of Admission:  11/2/2022    Assessment & Plan      Brent Stewart is a 62 year old male admitted on 11/2/2022. He presented to the emergency department for evaluation of abnormal labs noted in clinic, was found to be in mild HHS/DKA for which he is being admitted for further evaluation and treatment.    Mild DKA / HHS with mild anion gap metabolic acidosis  Type 2 diabetes mellitus   Diabetes has been previously diagnosed, but diet controlled. Prior HgbA1c 7.5 - 8.1, now up to 14.2 on admission.   Blood glucose on admission 326. VBG was technically normal, but nearly acidotic (pH 7.32, pCO2 45, pO2 26, bicarb 23). Anion gap 18, ketones 5.1 on admission.   Glucose trending down after fluids and insulin started in the emergency department (was given 10U Novolog and 20U Lantus). Patient's DKA was not severe enough to necessitate insulin infusion, so did not need ICU admission.  - 1L NS bolus given in the emergency department, continue with maintenance NS @ 150 ml/hr  - Lantus 20 units administered on 11/2, continue daily  - Novolog 10 units prandial tid with meals  - Start novolog high sliding scale  - Consistent carbohydrate diet  - BMP and ketones q 8h  - CBC and VBG in am   - Will need diabetic education and instruction on insulin administration  - Could consider C-peptide and KENDELL antibody as an outpatient lab to be followed by PCP     Electrolyte abnormalities  Hyponatremia: Na 131 in clinic on 11/1, increased to 137 on 11/2. Will monitor.   Hypercalcemia: Ca 10.2 in emergency department. Will monitor.     Syncopal episodes  Patient mentions prior history of syncopal episodes that occur when he is exerted and then bends over. He has not mentioned this to his PCP or been worked up for them in the past.   - Orthostatic vitals  - Could consider an echo and further work-up as outpatient     Abnormal chest CT - pulmonary  "nodules  Chest CT on 11/1 demonstrated \"No acute cardiopulmonary, intra-abdominal, or intrapelvic process. Several 2-3 mm noncalcified pulmonary nodules.\"  - Radiology recommending option 12 month follow-up CT, defer to PCP     Essential hypertension  No pharmacologic therapy prior to admission. Blood pressure stable.  - Monitor    Esophageal reflux  Managed prior to admission with omeprazole 20 mg daily.   - Pantoprazole formulary alternative while in hospital. Resume omeprazole at discharge.    Erectile dysfunction, unspecified erectile dysfunction type  Managed prior to admission with sildenafil 20-60 mg, hold while in the hospital.             Overweight  Estimated body mass index is 29.99 kg/m  as calculated from the following:  Height as of this encounter: 1.778 m (5' 10\").  Weight as of this encounter: 94.8 kg (209 lb).            Diet: Consistent Carbohydrate Diet Moderate Consistent Carb (60 g CHO per Meal) Diet  DVT Prophylaxis: Aspirin  Lambert Catheter: Not present  Central Lines: None  Cardiac Monitoring: None  Code Status: Full Code         Disposition Plan      Expected Discharge Date: 11/04/2022      Destination: home with family          The patient's care was discussed with the Attending Physician, Dr. Justice, Bedside Nurse and Patient.    This patient was seen in a shared visit between AGGIE Campos and Paradise Sepulveda PA-C. This note was written by AGGIE Campos and edited by Paradise Sepulveda PA-C.    AGGIE Campos  [facility] Physician Assistant Program      Paradise Sepulveda PA-C  Hospitalist Service  [facility]  Securely message with the Vocera Web Console (learn more here)  Text page via [facility] Paging/Directory         ______________________________________________________________________    Chief Complaint   \"I just haven't been feeling well.\"    History is obtained from the patient, review of EMR, and emergency department sign out from " Dr. Dru Willams.     History of Present Illness   Brent Stewart is a 62 year old male who presented to the emergency department following abnormal lab results in clinic yesterday (11/1) when seeing his PCP. He was concerned due to quick unexplained weight loss and feeling unwell for the past few months. Patient states he has been feeling generally unwell, slightly dizzy, nauseous, and experiencing generalized weakness and fatigue. He has not been vomiting or experiencing any pain. He reports polydipsia with a dry mouth and constant thirst, but drinking makes him gag. He endorses polyuria. He thought these symptoms were due to his diabetes so he saw his PCP this morning (11/2). He was told that his labs were abnormal due to his diabetes not being controlled and was advised to go to the emergency department.    Patient has known type 2 diabetes mellitus, managed with diet and exercise prior to admission. He reports not feeling well this past summer. He thought he had a cold that he could not get rid of, and it continued to worsen. He states he had a fever in June or July with night sweats. These symptoms have since resolved. In August and September, patient reports frequently feeling cold despite warm weather. He was not evaluated or diagnosed with any illness.       Review of systems:  Reports decrease in appetite, shortness of breath, difficulty breathing, dyspnea, occasional cough in the morning, constipation, numbness in feet.  Reports recent confusion and occasional visual hallucinations. He states he knows that the people he sees are not there and everything resolves when he closes his eyes. This has been occurring in the past few months.  Reports passing out and falling occasionally when he is winded and bends over, occurring for a few years. He described two episodes, one while remodeling the bathroom and one while shining for fish while drunk. These episodes have not been worked up.   Reports left flank  pain, present since he had COVID-19 in 2021.   Reports chronic nodules in lower throat and chronic palpitations, both have been discussed with his PCP.   Denies abdominal pain, wheezing, fevers, chest pain, diarrhea, headache, vision changes, slurred speech.  No known history of atrial fibrillation.      Review of Systems    The 10 point Review of Systems is negative other than noted in the HPI or here.    Past Medical History    I have reviewed this patient's medical history and updated it with pertinent information if needed.   Past Medical History:   Diagnosis Date     Hypertension        Past Surgical History   I have reviewed this patient's surgical history and updated it with pertinent information if needed.  Past Surgical History:   Procedure Laterality Date     ARTHROSCOPY KNEE RT/LT Right     meniscectomy     HERNIA REPAIR, UMBILICAL  1980     JOINT REPLACEMTN, KNEE RT/LT Right 2015    Joint Replacement knee RT/LT     PHACOEMULSIFICATION WITH STANDARD INTRAOCULAR LENS IMPLANT  2014    Procedure: PHACOEMULSIFICATION WITH STANDARD INTRAOCULAR LENS IMPLANT;  Surgeon: Issac Lee MD;  Location: [facility] OR     PHACOEMULSIFICATION WITH STANDARD INTRAOCULAR LENS IMPLANT  7/3/2014    Procedure: PHACOEMULSIFICATION WITH STANDARD INTRAOCULAR LENS IMPLANT;  Surgeon: Issac Lee MD;  Location: [facility] OR       Social History   I have reviewed this patient's social history and updated it with pertinent information if needed.  Social History     Tobacco Use     Smoking status: Former     Types: Cigarettes     Quit date: 2005     Years since quittin.6     Smokeless tobacco: Former   Substance Use Topics     Alcohol use: Yes     Comment: occas.     Drug use: No       Family History   I have reviewed this patient's family history and updated it with pertinent information if needed.  Family History   Problem Relation Age of Onset     Diabetes Mother      Cerebrovascular Disease Mother       Diabetes Father      Cancer Father      Coronary Artery Disease Early Onset Father 55        CABG       Prior to Admission Medications   Prior to Admission Medications   Prescriptions Last Dose Informant Patient Reported? Taking?   MULTIPLE VITAMIN PO 11/1/2022 at am Self Yes Yes   OMEPRAZOLE PO 11/2/2022 at am Self Yes Yes   Sig: Take 20 mg by mouth daily   acyclovir (ZOVIRAX) 400 MG tablet   No No   Sig: Take 1 tablet (400 mg) by mouth every 8 hours for 5 days   aspirin 81 MG EC tablet 11/1/2022 at am Self Yes Yes   Sig: Take 81 mg by mouth daily   blood glucose (NO BRAND SPECIFIED) test strip 11/2/2022 at am  Self No Yes   Sig: Use to test blood sugar 2 times daily or as directed.   sildenafil (REVATIO) 20 MG tablet More than a month Self No Yes   Sig: Take 1-3 tablets (20-60 mg) by mouth daily as needed (1 hour prior to sexual activity)   triamcinolone (KENALOG) 0.1 % external ointment Past Week Self No Yes   Sig: Apply topically 2 times daily   vitamin B-12 (CYANOCOBALAMIN) 100 MCG tablet 11/1/2022 at am Self Yes Yes   vitamin D3 (CHOLECALCIFEROL) 50 mcg (2000 units) tablet More than a month Self Yes Yes   Sig: Take 1 tablet by mouth daily      Facility-Administered Medications: None     Allergies   Allergies   Allergen Reactions     Hmg-Coa-R Inhibitors Unknown     Metoprolol Dizziness       Physical Exam   Vital Signs: Temp: 97.6  F (36.4  C) Temp src: Oral BP: (!) 154/89 Pulse: 66   Resp: 16 SpO2: 98 % O2 Device: None (Room air)    Weight: 217 lbs 5.98 oz    Constitutional: Alert, oriented, cooperative, no apparent distress, appears nontoxic, speaking in full sentences.     Eyes: Eyes are clear, pupils are reactive. No scleral icterus.     HEENT: Oropharynx is clear and moist, no lesions. Normocephalic, no evidence of cranial trauma.     Lymph/Hematologic: No epitrochlear, axillary, anterior or posterior cervical, or supraclavicular lymphadenopathy is appreciated.    Cardiovascular: Regular rhythm and  rate, normal S1 and S2. No murmur, rubs, or gallops. Peripheral pulses intact bilaterally. No lower extremity edema.    Respiratory: Lung sounds are clear to auscultation bilaterally without wheezes, rhonchi, or crackles.    GI: Soft, non-distended. Non-tender, no rebound or guarding. No hepatosplenomegaly or masses appreciated. Normal bowel sounds.     Musculoskeletal: Without obvious deformity, normal range of motion. Normal muscle bulk and tone. Distal CMS intact.     Skin: Warm and dry, no rashes or ecchymoses. No mottling of skin.      Neurologic: Patient moves all extremities. Gross strength and sensation are equal bilaterally.    Genitourinary: Deferred      Data   Data reviewed today: I reviewed all medications, new labs and imaging results over the last 24 hours. I personally reviewed the EKG tracing showing sinus rhyhtm without acute ST changes.    Recent Labs   Lab 11/03/22  0014 11/02/22  2245 11/02/22  1919 11/02/22  1717 11/02/22  1644 11/01/22  1504 11/01/22  1434   0000   WBC  --   --   --  5.9  --  6.0  --   --    HGB  --   --   --  14.8  --  13.6  --   --    MCV  --   --   --  88  --  90  --   --    PLT  --   --   --  178  --  156  --   --    NA  --   --   --  137  --  131*  --   --    POTASSIUM  --   --   --  4.1  --  4.8  --   --    CHLORIDE  --   --   --  98  --  95*  --   --    CO2  --   --   --  21*  --  17*  --   --    BUN  --   --   --  18.7  --  18.3  --   --    CR  --   --   --  0.83  --  0.81 0.8  --    ANIONGAP  --   --   --  18*  --  19*  --   --    INA  --   --   --  10.2  --  9.8  --   --    * 156* 257* 326*   < > 458*  --    < >   ALBUMIN  --   --   --   --   --  4.0  --   --    PROTTOTAL  --   --   --   --   --  6.8  --   --    BILITOTAL  --   --   --   --   --  0.5  --   --    ALKPHOS  --   --   --   --   --  89  --   --    ALT  --   --   --   --   --  16  --   --    AST  --   --   --   --   --  15  --   --    LIPASE  --   --   --  33  --   --   --   --     < > = values in  this interval not displayed.

## 2022-11-02 NOTE — ED PROVIDER NOTES
History     Chief Complaint   Patient presents with     Abnormal Labs     From [facility] with concerns for DKA     HPI  Brent Stewart is a 62 year old male with history notable for hypertension, GERD, diabetes type 2, obesity, who was sent from clinic with concerns for diabetic ketoacidosis.  Patient states that she has been a diabetic for approximately 8 years but is not on any medications to control his blood sugar.  Reports that lately has been feeling very tired and dizzy.  Reports a 40 pound weight loss unintentional in the past several months.  Is very hungry and thirsty. Labs from yesterday show A1C of 14.2, this was previously 7.5 in April. Blood sugar was elevated to 458 and he had an anion gap acidosis.  Patient reports that his blood sugars have been poorly controlled recently and sometimes are greater than 600.  Patient had CT scan of chest abdomen pelvis yesterday with no acute cardiopulmonary, intra-abdominal, or intrapelvic process.  Several 2 to 3 mm noncalcified pulmonary nodules are identified.     Patient with no acute complaints at this time.  No fevers or chills.  No nausea or vomiting.  No diarrhea.  No abdominal pain.  Does occasionally have a cough and was apprised that they did not see anything concerning on CT scan from yesterday.  No dysuria, urgency or frequency.  No rash.  No extremity edema.    The patient's PMHx, Surgical Hx, Allergies, and Medications were all reviewed with the patient.    Allergies:  Allergies   Allergen Reactions     Hmg-Coa-R Inhibitors Unknown     Metoprolol Dizziness       Problem List:    Patient Active Problem List    Diagnosis Date Noted     Diabetic ketoacidosis without coma associated with type 2 diabetes mellitus (H) 11/02/2022     Priority: Medium     Hyperglycemia 11/02/2022     Priority: Medium     Ketosis due to diabetes (H) 11/02/2022     Priority: Medium     Increased anion gap metabolic acidosis 11/02/2022     Priority: Medium     Morbid  obesity (H) 08/16/2022     Priority: Medium     Type 2 diabetes mellitus with diabetic polyneuropathy, without long-term current use of insulin (H) 07/23/2021     Priority: Medium     HSV (herpes simplex virus) infection 07/23/2021     Priority: Medium     Erectile dysfunction, unspecified erectile dysfunction type 07/23/2021     Priority: Medium     Status post total right knee replacement 11/04/2015     Priority: Medium     Abnormal chest CT 10/19/2015     Priority: Medium     Overview:   Needs CT follow up 2015.       Right knee pain 07/08/2015     Priority: Medium     Esophageal reflux 11/07/2014     Priority: Medium     Essential hypertension 11/07/2014     Priority: Medium     Senile nuclear sclerosis 06/18/2014     Priority: Medium     GERD (gastroesophageal reflux disease) 05/13/2014     Priority: Medium        Past Medical History:    Past Medical History:   Diagnosis Date     Hypertension        Past Surgical History:    Past Surgical History:   Procedure Laterality Date     ARTHROSCOPY KNEE RT/LT Right 7/14    meniscectomy     HERNIA REPAIR, UMBILICAL  1980     JOINT REPLACEMTN, KNEE RT/LT Right 11/2015    Joint Replacement knee RT/LT     PHACOEMULSIFICATION WITH STANDARD INTRAOCULAR LENS IMPLANT  6/19/2014    Procedure: PHACOEMULSIFICATION WITH STANDARD INTRAOCULAR LENS IMPLANT;  Surgeon: Issac Lee MD;  Location: [facility] OR     PHACOEMULSIFICATION WITH STANDARD INTRAOCULAR LENS IMPLANT  7/3/2014    Procedure: PHACOEMULSIFICATION WITH STANDARD INTRAOCULAR LENS IMPLANT;  Surgeon: Issac Lee MD;  Location: [facility] OR       Family History:    Family History   Problem Relation Age of Onset     Diabetes Mother      Cerebrovascular Disease Mother      Diabetes Father      Cancer Father      Coronary Artery Disease Early Onset Father 55        CABG       Social History:  Marital Status:  Single [1]  Social History     Tobacco Use     Smoking status: Former     Types: Cigarettes     Quit date: 4/1/2005  "    Years since quittin.6     Smokeless tobacco: Former   Substance Use Topics     Alcohol use: Yes     Comment: occas.     Drug use: No        Medications:    No current outpatient medications on file.        Review of Systems  A complete review of systems performed and is otherwise negative except as noted in the HPI    Physical Exam   BP: (!) 138/92  Pulse: 87  Temp: 97.4  F (36.3  C)  Resp: 20  Height: 177.8 cm (5' 10\")  Weight: 94.8 kg (209 lb)  SpO2: 98 %    Physical Exam  GEN: Awake, alert, and cooperative. No acute distress.  HENT: oral mucosa dry. External ears and nose normal bilaterally.  EYES: EOM intact. Conjunctiva clear. No discharge.   NECK: Symmetric, freely mobile.   CV : Regular rate and rhythm. No murmurs appreciated.   PULM: Normal effort. Mild expiratory wheeze in left posterior lung field.  ABD: Soft, non-tender, non-distended. No rebound or guarding.   NEURO: Normal speech. Following commands. CN II-XII grossly intact. Answering questions and interacting appropriately.   EXT: No gross deformity. Warm and well perfused.  INT: Warm. No diaphoresis. Normal color.        ED Course        Procedures         EKG: Interpreted by myself.  Sinus rhythm with rate of 66 bpm.  Normal intervals.  Normal axis.  Normal R wave progression.  No ectopy.    Critical Care time:  none               Results for orders placed or performed during the hospital encounter of 22 (from the past 24 hour(s))   Glucose by meter   Result Value Ref Range    GLUCOSE BY METER POCT 333 (H) 70 - 99 mg/dL   Blood gas venous   Result Value Ref Range    pH Venous 7.32 7.32 - 7.43    pCO2 Venous 45 40 - 50 mm Hg    pO2 Venous 26 25 - 47 mm Hg    Bicarbonate Venous 23 21 - 28 mmol/L    Base Excess/Deficit (+/-) -3.6 -7.7 - 1.9 mmol/L    FIO2 0    CBC with platelets differential    Narrative    The following orders were created for panel order CBC with platelets differential.  Procedure                               " Abnormality         Status                     ---------                               -----------         ------                     CBC with platelets and d...[[id number]]                      Final result                 Please view results for these tests on the individual orders.   Basic metabolic panel   Result Value Ref Range    Sodium 137 136 - 145 mmol/L    Potassium 4.1 3.4 - 5.3 mmol/L    Chloride 98 98 - 107 mmol/L    Carbon Dioxide (CO2) 21 (L) 22 - 29 mmol/L    Anion Gap 18 (H) 7 - 15 mmol/L    Urea Nitrogen 18.7 8.0 - 23.0 mg/dL    Creatinine 0.83 0.67 - 1.17 mg/dL    Calcium 10.2 8.8 - 10.2 mg/dL    Glucose 326 (H) 70 - 99 mg/dL    GFR Estimate >90 >60 mL/min/1.73m2   CBC with platelets and differential   Result Value Ref Range    WBC Count 5.9 4.0 - 11.0 10e3/uL    RBC Count 4.90 4.40 - 5.90 10e6/uL    Hemoglobin 14.8 13.3 - 17.7 g/dL    Hematocrit 43.2 40.0 - 53.0 %    MCV 88 78 - 100 fL    MCH 30.2 26.5 - 33.0 pg    MCHC 34.3 31.5 - 36.5 g/dL    RDW 13.2 10.0 - 15.0 %    Platelet Count 178 150 - 450 10e3/uL    % Neutrophils 52 %    % Lymphocytes 35 %    % Monocytes 8 %    % Eosinophils 4 %    % Basophils 1 %    % Immature Granulocytes 0 %    NRBCs per 100 WBC 0 <1 /100    Absolute Neutrophils 3.1 1.6 - 8.3 10e3/uL    Absolute Lymphocytes 2.1 0.8 - 5.3 10e3/uL    Absolute Monocytes 0.5 0.0 - 1.3 10e3/uL    Absolute Eosinophils 0.3 0.0 - 0.7 10e3/uL    Absolute Basophils 0.0 0.0 - 0.2 10e3/uL    Absolute Immature Granulocytes 0.0 <=0.4 10e3/uL    Absolute NRBCs 0.0 10e3/uL   Troponin T, High Sensitivity   Result Value Ref Range    Troponin T, High Sensitivity 9 <=22 ng/L   Lipase   Result Value Ref Range    Lipase 33 13 - 60 U/L   Ketone Beta-Hydroxybutyrate Quantitative   Result Value Ref Range    Ketone (Beta-Hydroxybutyrate) Quantitative 5.1 (HH) 0.0 - 0.6 mmol/L   UA with Microscopic reflex to Culture    Specimen: Urine, Clean Catch   Result Value Ref Range    Color Urine Straw Colorless, Straw,  Light Yellow, Yellow    Appearance Urine Clear Clear    Glucose Urine 1000 (A) Negative mg/dL    Bilirubin Urine Negative Negative    Ketones Urine 80 (A) Negative mg/dL    Specific Gravity Urine 1.030 1.003 - 1.035    Blood Urine Negative Negative    pH Urine 5.0 5.0 - 7.0    Protein Albumin Urine Negative Negative mg/dL    Urobilinogen Urine Normal Normal, 2.0 mg/dL    Nitrite Urine Negative Negative    Leukocyte Esterase Urine Negative Negative    Mucus Urine Present (A) None Seen /LPF    RBC Urine 1 <=2 /HPF    WBC Urine <1 <=5 /HPF    Squamous Epithelials Urine <1 <=1 /HPF    Narrative    Urine Culture not indicated   Glucose by meter   Result Value Ref Range    GLUCOSE BY METER POCT 257 (H) 70 - 99 mg/dL   Asymptomatic COVID-19 Virus (Coronavirus) by PCR Nose    Specimen: Nose; Swab   Result Value Ref Range    SARS CoV2 PCR Negative Negative    Narrative    Testing was performed using the sergio  SARS-CoV-2 & Influenza A/B Assay on the sergio  Cher[employer]  System.  This test should be ordered for the detection of SARS-COV-2 in individuals who meet SARS-CoV-2 clinical and/or epidemiological criteria. Test performance is unknown in asymptomatic patients.  This test is for in vitro diagnostic use under the FDA EUA for laboratories certified under CLIA to perform moderate and/or high complexity testing. This test has not been FDA cleared or approved.  A negative test does not rule out the presence of PCR inhibitors in the specimen or target RNA in concentration below the limit of detection for the assay. The possibility of a false negative should be considered if the patient's recent exposure or clinical presentation suggests COVID-19.  [facility] are certified under the Clinical Laboratory Improvement Amendments of 1988 (CLIA-88) as qualified to perform moderate and/or high complexity laboratory testing.       Medications   0.9% sodium chloride BOLUS (0 mLs Intravenous Stopped 11/2/22 4910)      Followed by   0.45% sodium chloride infusion (0 mLs Intravenous ED Infusing on Admission/transfer 11/2/22 2137)   insulin aspart (NovoLOG) injection (RAPID ACTING) (10 Units Subcutaneous Given 11/2/22 1855)   lactated ringers BOLUS 1,000 mL (1,000 mLs Intravenous New Bag 11/2/22 1900)   insulin glargine (LANTUS PEN) injection 20 Units (20 Units Subcutaneous Given 11/2/22 2047)       Assessments & Plan (with Medical Decision Making)   62 year old male with past medical history for diabetes type 2 not on medications with unintended weight loss over the past few months, polyphagia, polydipsia and seen in clinic with elevated blood glucose and metabolic acidosis.  A1c elevated at 14.2 which was previously in the 7 range earlier this spring.    Arrived emergency department afebrile, mildly hypertensive, no tachycardia, tachypnea or hypoxia.  Initial examination patient did not appear acutely distressed and breathing comfortably with a normal rate.  No  small breathing.  Exam unremarkable.  No obvious source of infection.  Patient did have CT scan chest abdomen pelvis completed yesterday as outpatient which was relatively unremarkable, no signs of infection.  No obvious cause why his blood sugars are now so poorly controlled.  No report of chest pain.  EKG without evidence of acute ischemia and high sensitive troponin is not elevated.  B hydroxybutyrate elevated to 5.1.  Metabolic panel with anion gap of 18 and carbon dioxide of 21.  Glucose 326.  Venous blood gas was normal.  pH was 7.32 which is the lower end of normal. CBC normal. UA without evidence of acute infection and normal lipase. Patient does not technically meet criteria for DKA.  I do think however that he would benefit from treatment with insulin and hospitalization.  Discussed options of insulin drip versus subcu insulin with Dr. Justice of the hospitalist team.  Ultimately elected to give his subcutaneous insulin.  He was given 10 units and a liter of IV  fluids.  On recheck blood sugar glucose reduced now to 257.  Patient was also given 20 units of long-acting insulin. Patient will likely need sliding scale insulin and diabetic education.     Case discussed with Paradise Sepulveda and the hospital service who accepted the admission. Transition orders placed.     I have reviewed the nursing notes.        Current Discharge Medication List          Final diagnoses:   Hyperglycemia   Ketosis due to diabetes (H)   Increased anion gap metabolic acidosis     Felix Willams MD    11/2/2022   [facility] EMERGENCY DEPT    Disclaimer: This note consists of words and symbols derived from keyboarding and dictation using voice recognition software.  As a result, there may be errors that have gone undetected.  Please consider this when interpreting information found in this note.             Felix Willams MD  11/02/22 222       Felix Willams MD  11/02/22 2229

## 2022-11-02 NOTE — PATIENT INSTRUCTIONS
"Check in at the ER desk at [facility].    * * *    Per federal transparency in medicine laws, lab and imaging results are released \"real time\" into My Chart.  This may mean that you see the results before I have a chance to review them. My Chart will alert you again when I review the lab and enter comments.  Sometimes with imaging or labs there may be serious or unexpected results. Critical results are paged to me or the after hours on-call provider so that they can be reviewed immediately.  This is not true of non-critical abnormal results. Unfortunately, this means that it's possible you may be alerted of a serious finding before I have a change to review it.  If you ever receive a result that you are concerned about and I have not already contacted you, please feel free to reach out to me or the care team so that you get the answers you need.    Additionally, it is my goal that you understand the care plan discussed at your visit and that any questions you have are answered.  Please feel free to reach out if you need clarification or explanation of any information addressed at your office visit.    "

## 2022-11-03 ENCOUNTER — TELEPHONE (OUTPATIENT)
Dept: FAMILY MEDICINE | Facility: CLINIC | Age: 62
End: 2022-11-03

## 2022-11-03 LAB
ANION GAP SERPL CALCULATED.3IONS-SCNC: 11 MMOL/L (ref 7–15)
ANION GAP SERPL CALCULATED.3IONS-SCNC: 12 MMOL/L (ref 7–15)
ANION GAP SERPL CALCULATED.3IONS-SCNC: 9 MMOL/L (ref 7–15)
ANION GAP SERPL CALCULATED.3IONS-SCNC: 9 MMOL/L (ref 7–15)
BASE EXCESS BLDV CALC-SCNC: 0.1 MMOL/L (ref -7.7–1.9)
BASE EXCESS BLDV CALC-SCNC: 0.2 MMOL/L (ref -7.7–1.9)
BUN SERPL-MCNC: 10 MG/DL (ref 8–23)
BUN SERPL-MCNC: 11 MG/DL (ref 8–23)
BUN SERPL-MCNC: 11.3 MG/DL (ref 8–23)
BUN SERPL-MCNC: 13.6 MG/DL (ref 8–23)
CALCIUM SERPL-MCNC: 8.7 MG/DL (ref 8.8–10.2)
CALCIUM SERPL-MCNC: 8.8 MG/DL (ref 8.8–10.2)
CALCIUM SERPL-MCNC: 8.8 MG/DL (ref 8.8–10.2)
CALCIUM SERPL-MCNC: 8.9 MG/DL (ref 8.8–10.2)
CHLORIDE SERPL-SCNC: 100 MMOL/L (ref 98–107)
CHLORIDE SERPL-SCNC: 103 MMOL/L (ref 98–107)
CHLORIDE SERPL-SCNC: 104 MMOL/L (ref 98–107)
CHLORIDE SERPL-SCNC: 99 MMOL/L (ref 98–107)
CREAT SERPL-MCNC: 0.61 MG/DL (ref 0.67–1.17)
CREAT SERPL-MCNC: 0.62 MG/DL (ref 0.67–1.17)
CREAT SERPL-MCNC: 0.65 MG/DL (ref 0.67–1.17)
CREAT SERPL-MCNC: 0.75 MG/DL (ref 0.67–1.17)
DEPRECATED HCO3 PLAS-SCNC: 22 MMOL/L (ref 22–29)
DEPRECATED HCO3 PLAS-SCNC: 23 MMOL/L (ref 22–29)
DEPRECATED HCO3 PLAS-SCNC: 23 MMOL/L (ref 22–29)
DEPRECATED HCO3 PLAS-SCNC: 26 MMOL/L (ref 22–29)
GFR SERPL CREATININE-BSD FRML MDRD: >90 ML/MIN/1.73M2
GLUCOSE BLDC GLUCOMTR-MCNC: 140 MG/DL (ref 70–99)
GLUCOSE BLDC GLUCOMTR-MCNC: 187 MG/DL (ref 70–99)
GLUCOSE BLDC GLUCOMTR-MCNC: 198 MG/DL (ref 70–99)
GLUCOSE BLDC GLUCOMTR-MCNC: 198 MG/DL (ref 70–99)
GLUCOSE BLDC GLUCOMTR-MCNC: 206 MG/DL (ref 70–99)
GLUCOSE BLDC GLUCOMTR-MCNC: 261 MG/DL (ref 70–99)
GLUCOSE SERPL-MCNC: 176 MG/DL (ref 70–99)
GLUCOSE SERPL-MCNC: 208 MG/DL (ref 70–99)
GLUCOSE SERPL-MCNC: 209 MG/DL (ref 70–99)
GLUCOSE SERPL-MCNC: 232 MG/DL (ref 70–99)
HCO3 BLDV-SCNC: 25 MMOL/L (ref 21–28)
HCO3 BLDV-SCNC: 25 MMOL/L (ref 21–28)
HOLD SPECIMEN: NORMAL
KETONES BLD-SCNC: 1.3 MMOL/L (ref 0–0.6)
KETONES BLD-SCNC: 1.4 MMOL/L (ref 0–0.6)
O2/TOTAL GAS SETTING VFR VENT: 21 %
O2/TOTAL GAS SETTING VFR VENT: 21 %
PCO2 BLDV: 38 MM HG (ref 40–50)
PCO2 BLDV: 41 MM HG (ref 40–50)
PH BLDV: 7.39 [PH] (ref 7.32–7.43)
PH BLDV: 7.42 [PH] (ref 7.32–7.43)
PO2 BLDV: 60 MM HG (ref 25–47)
PO2 BLDV: 69 MM HG (ref 25–47)
POTASSIUM SERPL-SCNC: 3.3 MMOL/L (ref 3.4–5.3)
POTASSIUM SERPL-SCNC: 3.4 MMOL/L (ref 3.4–5.3)
POTASSIUM SERPL-SCNC: 3.8 MMOL/L (ref 3.4–5.3)
POTASSIUM SERPL-SCNC: 3.9 MMOL/L (ref 3.4–5.3)
POTASSIUM SERPL-SCNC: 4 MMOL/L (ref 3.4–5.3)
SODIUM SERPL-SCNC: 134 MMOL/L (ref 136–145)
SODIUM SERPL-SCNC: 134 MMOL/L (ref 136–145)
SODIUM SERPL-SCNC: 135 MMOL/L (ref 136–145)
SODIUM SERPL-SCNC: 138 MMOL/L (ref 136–145)

## 2022-11-03 PROCEDURE — 250N000013 HC RX MED GY IP 250 OP 250 PS 637: Performed by: INTERNAL MEDICINE

## 2022-11-03 PROCEDURE — 84132 ASSAY OF SERUM POTASSIUM: CPT | Performed by: INTERNAL MEDICINE

## 2022-11-03 PROCEDURE — 84132 ASSAY OF SERUM POTASSIUM: CPT | Performed by: PHYSICIAN ASSISTANT

## 2022-11-03 PROCEDURE — 82803 BLOOD GASES ANY COMBINATION: CPT | Performed by: PHYSICIAN ASSISTANT

## 2022-11-03 PROCEDURE — 36415 COLL VENOUS BLD VENIPUNCTURE: CPT | Performed by: PHYSICIAN ASSISTANT

## 2022-11-03 PROCEDURE — 99232 SBSQ HOSP IP/OBS MODERATE 35: CPT | Performed by: INTERNAL MEDICINE

## 2022-11-03 PROCEDURE — 36415 COLL VENOUS BLD VENIPUNCTURE: CPT | Performed by: INTERNAL MEDICINE

## 2022-11-03 PROCEDURE — 250N000011 HC RX IP 250 OP 636: Performed by: INTERNAL MEDICINE

## 2022-11-03 PROCEDURE — 250N000013 HC RX MED GY IP 250 OP 250 PS 637: Performed by: PHYSICIAN ASSISTANT

## 2022-11-03 PROCEDURE — 258N000003 HC RX IP 258 OP 636: Performed by: PHYSICIAN ASSISTANT

## 2022-11-03 PROCEDURE — 120N000001 HC R&B MED SURG/OB

## 2022-11-03 PROCEDURE — 82010 KETONE BODYS QUAN: CPT | Performed by: PHYSICIAN ASSISTANT

## 2022-11-03 RX ORDER — ASPIRIN 81 MG/1
81 TABLET ORAL DAILY
Status: DISCONTINUED | OUTPATIENT
Start: 2022-11-03 | End: 2022-11-04 | Stop reason: HOSPADM

## 2022-11-03 RX ORDER — SODIUM CHLORIDE 9 MG/ML
INJECTION, SOLUTION INTRAVENOUS CONTINUOUS
Status: DISCONTINUED | OUTPATIENT
Start: 2022-11-03 | End: 2022-11-03

## 2022-11-03 RX ORDER — SODIUM CHLORIDE AND POTASSIUM CHLORIDE 150; 900 MG/100ML; MG/100ML
INJECTION, SOLUTION INTRAVENOUS CONTINUOUS
Status: DISCONTINUED | OUTPATIENT
Start: 2022-11-03 | End: 2022-11-04 | Stop reason: HOSPADM

## 2022-11-03 RX ORDER — PANTOPRAZOLE SODIUM 40 MG/1
40 TABLET, DELAYED RELEASE ORAL
Status: DISCONTINUED | OUTPATIENT
Start: 2022-11-03 | End: 2022-11-04 | Stop reason: HOSPADM

## 2022-11-03 RX ORDER — LIDOCAINE 40 MG/G
CREAM TOPICAL
Status: DISCONTINUED | OUTPATIENT
Start: 2022-11-03 | End: 2022-11-04 | Stop reason: HOSPADM

## 2022-11-03 RX ORDER — ACETAMINOPHEN 325 MG/1
650 TABLET ORAL EVERY 6 HOURS PRN
Status: DISCONTINUED | OUTPATIENT
Start: 2022-11-03 | End: 2022-11-04 | Stop reason: HOSPADM

## 2022-11-03 RX ORDER — PROCHLORPERAZINE MALEATE 5 MG
10 TABLET ORAL EVERY 6 HOURS PRN
Status: DISCONTINUED | OUTPATIENT
Start: 2022-11-03 | End: 2022-11-04 | Stop reason: HOSPADM

## 2022-11-03 RX ORDER — ONDANSETRON 2 MG/ML
4 INJECTION INTRAMUSCULAR; INTRAVENOUS EVERY 6 HOURS PRN
Status: DISCONTINUED | OUTPATIENT
Start: 2022-11-03 | End: 2022-11-04 | Stop reason: HOSPADM

## 2022-11-03 RX ORDER — AMOXICILLIN 250 MG
2 CAPSULE ORAL 2 TIMES DAILY PRN
Status: DISCONTINUED | OUTPATIENT
Start: 2022-11-03 | End: 2022-11-04 | Stop reason: HOSPADM

## 2022-11-03 RX ORDER — ONDANSETRON 4 MG/1
4 TABLET, ORALLY DISINTEGRATING ORAL EVERY 6 HOURS PRN
Status: DISCONTINUED | OUTPATIENT
Start: 2022-11-03 | End: 2022-11-04 | Stop reason: HOSPADM

## 2022-11-03 RX ORDER — AMOXICILLIN 250 MG
1 CAPSULE ORAL 2 TIMES DAILY PRN
Status: DISCONTINUED | OUTPATIENT
Start: 2022-11-03 | End: 2022-11-04 | Stop reason: HOSPADM

## 2022-11-03 RX ORDER — NICOTINE POLACRILEX 4 MG
15-30 LOZENGE BUCCAL
Status: DISCONTINUED | OUTPATIENT
Start: 2022-11-03 | End: 2022-11-04 | Stop reason: HOSPADM

## 2022-11-03 RX ORDER — POTASSIUM CHLORIDE 1500 MG/1
40 TABLET, EXTENDED RELEASE ORAL ONCE
Status: COMPLETED | OUTPATIENT
Start: 2022-11-03 | End: 2022-11-03

## 2022-11-03 RX ORDER — DEXTROSE MONOHYDRATE 25 G/50ML
25-50 INJECTION, SOLUTION INTRAVENOUS
Status: DISCONTINUED | OUTPATIENT
Start: 2022-11-03 | End: 2022-11-04 | Stop reason: HOSPADM

## 2022-11-03 RX ORDER — PROCHLORPERAZINE 25 MG
25 SUPPOSITORY, RECTAL RECTAL EVERY 12 HOURS PRN
Status: DISCONTINUED | OUTPATIENT
Start: 2022-11-03 | End: 2022-11-04 | Stop reason: HOSPADM

## 2022-11-03 RX ADMIN — POTASSIUM CHLORIDE AND SODIUM CHLORIDE: 900; 150 INJECTION, SOLUTION INTRAVENOUS at 13:23

## 2022-11-03 RX ADMIN — SODIUM CHLORIDE: 9 INJECTION, SOLUTION INTRAVENOUS at 01:08

## 2022-11-03 RX ADMIN — ASPIRIN 81 MG: 81 TABLET, COATED ORAL at 08:58

## 2022-11-03 RX ADMIN — SENNOSIDES AND DOCUSATE SODIUM 1 TABLET: 50; 8.6 TABLET ORAL at 13:29

## 2022-11-03 RX ADMIN — POTASSIUM CHLORIDE 40 MEQ: 1500 TABLET, EXTENDED RELEASE ORAL at 13:22

## 2022-11-03 RX ADMIN — POTASSIUM CHLORIDE AND SODIUM CHLORIDE 1000 ML: 900; 150 INJECTION, SOLUTION INTRAVENOUS at 22:16

## 2022-11-03 RX ADMIN — PANTOPRAZOLE SODIUM 40 MG: 40 TABLET, DELAYED RELEASE ORAL at 06:38

## 2022-11-03 ASSESSMENT — ACTIVITIES OF DAILY LIVING (ADL)
ADLS_ACUITY_SCORE: 22

## 2022-11-03 NOTE — PLAN OF CARE
"[facility] ADMISSION NOTE    Patient admitted to room 2403 at approximately 2202 via wheel chair from emergency room. Patient was accompanied by transport tech.     Verbal SBAR report received from DEVIKA Chávez prior to patient arrival.     Patient ambulated to bed independently. Patient alert and oriented X 4. The patient is not having any pain.  . Admission vital signs: Blood pressure 134/70, pulse 81, temperature 97.6  F (36.4  C), temperature source Oral, resp. rate 16, height 1.778 m (5' 10\"), weight 98.6 kg (217 lb 6 oz), SpO2 99 %. Patient was oriented to plan of care, call light, bed controls, tv, telephone, bathroom, and visiting hours.     Risk Assessment    The following safety risks were identified during admission: none. Yellow risk band applied: NO.     Skin Initial Assessment    This writer admitted this patient and completed a full skin assessment and Geoffrey score in the Adult PCS flowsheet. Appropriate interventions initiated as needed.     Secondary skin check completed by Sue Harvey RN.    Geoffrey Risk Assessment  Sensory Perception: 3-->slightly limited  Moisture: 4-->rarely moist  Activity: 4-->walks frequently  Mobility: 4-->no limitation  Nutrition: 2-->probably inadequate  Friction and Shear: 3-->no apparent problem  Geoffrey Score: 20  Sensory/Activity/Mobility Interventions: Encourage activity/OOB  Nutrition Interventions: Encourage protein intake, Maintain adequate hydration  Mattress: Standard Hospital Mattress (Foam)  Bed Frame: Standard width and length    Education    Patient has a [place] to Observation order: No  Observation education completed and documented: N/A      Caron Tineo RN    Goal Outcome Evaluation:                        "

## 2022-11-03 NOTE — PLAN OF CARE
Goal Outcome Evaluation:      Neuro: A&O x4  Cardiac:  WNL  Respiratory: WNL  GI/: WNL, PRN senna provided per pt's request  Diet/appetite: Diabetic  Activity: Ind  Pain: Denies   Skin: Posterior neck cyst, scattered bruising and cracked skin  LDA's:  IV infusing at 125 mL/ hr, 0.9 sodium chloride + 20 mEq (sent from pharmacy)     Other: K replacement protocol and K replaced this AM. Next draw 1700.     Plan: Blood glucose control and insulin pen use education.

## 2022-11-03 NOTE — CARE PLAN
Neuro: A/O x 4  Cardiac: WNL  Respiratory: LS are clear.  No SOB noted.  GI/: c/o nausea intermittently.  No vomiting.  Continent of bowel and bladder  Diet/appetite:  poor appetite.  Has lost 30# in the last 5 weeks.  Has applesauce and gramh crackers at HS.    Activity: Independent.  Steady gait.    Pain: No c/o pain.  Skin: dry scaly skin bilateral feet.  Cyst posterior neck.  LDA's:   IV right hand patent and infusing NS @ 150/hr     Other: New to using insuline.    Plan: Continue monitoring blood sugars.  Education on use of insulin pen.

## 2022-11-03 NOTE — PROGRESS NOTES
"CLINICAL NUTRITION SERVICES - ASSESSMENT NOTE     Nutrition Prescription    RECOMMENDATIONS FOR MDs/PROVIDERS TO ORDER:  None at this time    Malnutrition Status:    Severe malnutrition in the context of chronic illness    Recommendations already ordered by Registered Dietitian (RD):  Please send Glucerna BID with breakfast and dinner tray  Nutrition education on diabetic diet recommendations     Future/Additional Recommendations:  Monitor patient weight, intakes, labs and GI/BM  Monitor patient tolerance to supplement     REASON FOR ASSESSMENT  Brent Stewart is a/an 62 year old male assessed by the dietitian for Admission Nutrition Risk Screen for positive    NUTRITION HISTORY  Patient is a 62 year old male who presents with DKA with mild anion gap metabolic acidosis; type 2 DM, electrolyte abnormalities, syncopal episodes, HTN, esophageal reflux, overweight  Per patient interview  Patient reports around a 30-40 lb weight loss; patient reported some nausea with foods prior to admission. RD educated on the importance of balancing blood sugars and maintaining adequate protein and calories. Education handout added to patient discharge instructions. Patient has not tried supplements in the past but was open to trying Glucerna; RD placed orders    CURRENT NUTRITION ORDERS  Diet: Orders Placed This Encounter      Consistent Carbohydrate Diet Moderate Consistent Carb (60 g CHO per Meal) Diet      Intake/Tolerance: No recorded intakes in patient chart.    LABS  Labs reviewed  Potassium slightly low-3.3 mg/dL  Glucose elevated-187 mg/dL    MEDICATIONS  Medications reviewed  Scheduled: Novolog, Lantus, Protonix  Continuous: Sodium Chloride infusion-150 mL/hr  PRN: None    ANTHROPOMETRICS  Height: 177.8 cm (5' 10\")  Most Recent Weight: 96.3 kg (212 lb 4.9 oz)    IBW: 75.5 kg  BMI: Obesity Grade I BMI 30-34.9  Weight History:   Wt Readings from Last 15 Encounters:   11/03/22 96.3 kg (212 lb 4.9 oz)   11/02/22 94.8 kg (209 " lb)   10/05/22 99.8 kg (220 lb)   08/16/22 114.3 kg (252 lb)   04/06/22 112 kg (247 lb)   07/23/21 108.9 kg (240 lb)   02/06/18 116.2 kg (256 lb 3.2 oz)   11/07/14 113.9 kg (251 lb 3.2 oz)   15% significant weight loss noted within 3 months.    Dosing Weight: 80.7 kg-adjusted BW used.    ASSESSED NUTRITION NEEDS  Estimated Energy Needs: 2,017-2,421 kcals/day (25 - 30 kcals/kg)  Justification: Maintenance  Estimated Protein Needs: 81-96 grams protein/day (1 - 1.2 grams of pro/kg)  Justification: Maintenance  Estimated Fluid Needs: 2,017-2,421 mL/day (1 mL/kcal)   Justification: Per provider pending fluid status    PHYSICAL FINDINGS  See malnutrition section below.    MALNUTRITION  % Intake: </=75% for >/= 1 month (severe)  % Weight Loss: > 7.5% in 3 months (severe)  Subcutaneous Fat Loss: None observed  Muscle Loss: None observed  Fluid Accumulation/Edema: None noted  Malnutrition Diagnosis: Severe malnutrition in the context of chronic illness    NUTRITION DIAGNOSIS  Malnutrition related to inadequate oral intakes as evidenced by patient intakes of less than 75% for greater than one month and 15% weight loss noted within 3 months.     INTERVENTIONS  Implementation  Collaboration with other providers  Medical food supplement therapy     Goals  Patient to consume % of nutritionally adequate meal trays TID, or the equivalent with supplements/snacks.     Monitoring/Evaluation  Progress toward goals will be monitored and evaluated per protocol.    Anh Ibrahim RDN, BALDEMAR  Clinical Dietitian  Office: [phone]  Weekend pager: [phone]

## 2022-11-03 NOTE — ED NOTES
MD notified writer that POC has changed for pt. Insulin drip discontinued, and 10 units insulin aspart via pen ordered. Pt to get total of 2 L fluid bolus.     Insulin aspart administered as ordered in MAR, and K+ IV dose stopped as order has been discontinued per MD. 1L of NS bolus has been completed, and 2nd L (LR) currently infusing. Pt asymptomatic, and vss. Staff will continue to monitor.

## 2022-11-03 NOTE — PLAN OF CARE
Skin affirmation note    Admitting nurse completed full skin assessment, Geoffrey score and Geoffrey interventions. This writer agrees with the initial skin assessment findings.    Sue Salas RN on 11/3/2022 at 1:10 AM

## 2022-11-03 NOTE — PROGRESS NOTES
"[facility]    Medicine Progress Note - Hospitalist Service    Date of Admission:  11/2/2022    Assessment & Plan    Brent Stewart is a 62 year old male admitted on 11/2/2022. He presented to the emergency department for evaluation of abnormal labs noted in clinic, was found to be in mild HHS/DKA for which he is being admitted for further evaluation and treatment.     Principal Problem:    Diabetic ketoacidosis without coma associated with type 2 diabetes mellitus (H)    Assessment: Diabetes has been previously diagnosed, but diet controlled. Prior HgbA1c 7.5 - 8.1, now up to 14.2 on admission. Blood glucose on admission 326. VBG was technically normal, but nearly acidotic (pH 7.32, pCO2 45, pO2 26, bicarb 23). Anion gap 18, ketones 5.1 on admission. Glucose trending down after fluids and insulin started in the emergency department (was given 10U Novolog and 20U Lantus). Patient's DKA was not severe enough to necessitate insulin infusion, so did not need ICU admission.    Plan:  1. Continue IV fluids with normal saline, add potassium.   2. Continue current dose of Lantus.   3. Continue prandial Novolog plus sliding scale for hyperglycemia.   4. Diabetic education and instruction on insulin administration.   5. Follow up in endocrinology clinic.    Active Problems:    Esophageal reflux    Assessment: Controlled on PPI.    Plan: Continue.      Abnormal chest CT    Assessment: Chest CT on 11/1 demonstrated \"No acute cardiopulmonary, intra-abdominal, or intrapelvic process. Several 2-3 mm noncalcified pulmonary nodules.\"    Plan: Radiology recommending 12 month follow-up CT, defer to PCP.          Diet: Consistent Carbohydrate Diet Moderate Consistent Carb (60 g CHO per Meal) Diet  Snacks/Supplements Adult: Glucerna; With Meals    DVT Prophylaxis: Low Risk/Ambulatory with no VTE prophylaxis indicated  Lambert Catheter: Not present  Central Lines: None  Cardiac Monitoring: None  Code Status: " "Full Code      Disposition Plan      Expected Discharge Date: 11/04/2022      Destination: home with family          The patient's care was discussed with the Bedside Nurse, Care Coordinator/ and Patient.    Ramírez Boss MD  Hospitalist Service  [facility]  Securely message with the Vocera Web Console (learn more here)  Text page via [employer] Paging/Directory         Clinically Significant Risk Factors Present on Admission        # Hypokalemia: Lowest K = 3.3 mmol/L (Ref range: 3.4-5.3) in last 2 days, will replace as needed  # Hyponatremia: Lowest Na = 134 mmol/L (Ref range: 136-145) in last 2 days, will monitor as appropriate   # Hypercalcemia: Highest Ca = 10.2 mg/dL (Ref range: 8.5 - 10.1 mg/dL) in last 2 days, will monitor as appropriate           # DMII: A1C = 14.2 % (Ref range: 0.0 - 5.6 %) within past 3 months    # Obesity: Estimated body mass index is 30.46 kg/m  as calculated from the following:    Height as of this encounter: 1.778 m (5' 10\").    Weight as of this encounter: 96.3 kg (212 lb 4.9 oz).    # Severe Malnutrition: based on nutrition assessment         ______________________________________________________________________    Interval History   The patient was admitted yesterday with DKA. He is feeling better. Denies any pain, shortness of breath, chest discomfort, abdominal pain, N/V.     Data reviewed today: I reviewed all medications, new labs and imaging results over the last 24 hours. I personally reviewed the EKG tracing showing sinus rhythm, rate 66, no acute ST changes.    Physical Exam   Vital Signs: Temp: 97.6  F (36.4  C) Temp src: Oral BP: (!) 144/78 Pulse: 74   Resp: 16 SpO2: 99 % O2 Device: None (Room air)    Weight: 212 lbs 4.85 oz  Constitutional: awake, alert, cooperative, no apparent distress, and appears stated age  Eyes: Lids and lashes normal, pupils equal, round and reactive to light, extra ocular muscles intact, sclera clear, " conjunctiva normal  ENT: Normocephalic, without obvious abnormality, atraumatic, external ears without lesions  Respiratory: No increased work of breathing, good air exchange, clear to auscultation bilaterally, no crackles or wheezing  Cardiovascular: Normal apical impulse, regular rate and rhythm, normal S1 and S2, no S3 or S4, and no murmur noted  GI: Normal bowel sounds, soft, non-distended, non-tender, no masses palpated, no hepatosplenomegaly  Skin: normal skin color, texture, turgor and no rashes  Musculoskeletal: There is no redness, warmth, or swelling of the joints.  Motor strength is 5 out of 5 all extremities bilaterally.  Tone is normal.  Neurologic: Awake, alert, oriented to name, place and time.  Cranial nerves II-XII are grossly intact.  Motor is 5 out of 5 bilaterally.  Sensory is grossly intact.     Data   Recent Labs   Lab 11/03/22  1721 11/03/22  1636 11/03/22  1418 11/03/22  1210 11/03/22  0742 11/03/22  0555 11/03/22  0106 11/03/22  0102 11/02/22  1919 11/02/22  1717 11/02/22  1644 11/01/22  1504   WBC  --   --   --   --   --   --   --   --   --  5.9  --  6.0   HGB  --   --   --   --   --   --   --   --   --  14.8  --  13.6   MCV  --   --   --   --   --   --   --   --   --  88  --  90   PLT  --   --   --   --   --   --   --   --   --  178  --  156   NA  --   --  134*  --   --  138  --  134*  --  137  --  131*   POTASSIUM 3.8  --  3.9  --   --  3.3*  --  3.4  --  4.1  --  4.8   CHLORIDE  --   --  99  --   --  104  --  100  --  98  --  95*   CO2  --   --  26  --   --  23  --  22  --  21*  --  17*   BUN  --   --  10.0  --   --  11.3  --  13.6  --  18.7  --  18.3   CR  --   --  0.75  --   --  0.61*  --  0.62*  --  0.83  --  0.81   ANIONGAP  --   --  9  --   --  11  --  12  --  18*  --  19*   INA  --   --  8.9  --   --  8.7*  --  8.8  --  10.2  --  9.8   GLC  --  140* 232* 261*   < > 176*   < > 208*   < > 326*   < > 458*   ALBUMIN  --   --   --   --   --   --   --   --   --   --   --  4.0   PROTTOTAL   --   --   --   --   --   --   --   --   --   --   --  6.8   BILITOTAL  --   --   --   --   --   --   --   --   --   --   --  0.5   ALKPHOS  --   --   --   --   --   --   --   --   --   --   --  89   ALT  --   --   --   --   --   --   --   --   --   --   --  16   AST  --   --   --   --   --   --   --   --   --   --   --  15   LIPASE  --   --   --   --   --   --   --   --   --  33  --   --     < > = values in this interval not displayed.     No results found for this or any previous visit (from the past 24 hour(s)).  Medications     0.9% sodium chloride + KCl 20 mEq/L 125 mL/hr at 11/03/22 1323       aspirin  81 mg Oral Daily     insulin aspart  10 Units Subcutaneous Daily with breakfast     insulin aspart  10 Units Subcutaneous Daily with lunch     insulin aspart  10 Units Subcutaneous Daily with supper     insulin aspart  1-10 Units Subcutaneous TID AC     insulin aspart  1-7 Units Subcutaneous At Bedtime     insulin glargine  20 Units Subcutaneous At Bedtime     pantoprazole  40 mg Oral QAM AC     sodium chloride (PF)  3 mL Intracatheter Q8H

## 2022-11-03 NOTE — TELEPHONE ENCOUNTER
Diabetes Education Scheduling Outreach #1:    Call to patient to schedule. Patient is still inpatient at [facility].    Plan for 2nd outreach attempt within 2 business days.    Erinn Hamilton  [facility]  Diabetes and Nutrition Scheduling

## 2022-11-04 VITALS
BODY MASS INDEX: 30.39 KG/M2 | TEMPERATURE: 97.4 F | SYSTOLIC BLOOD PRESSURE: 142 MMHG | RESPIRATION RATE: 18 BRPM | HEIGHT: 70 IN | WEIGHT: 212.3 LBS | OXYGEN SATURATION: 99 % | HEART RATE: 74 BPM | DIASTOLIC BLOOD PRESSURE: 79 MMHG

## 2022-11-04 LAB
ANION GAP SERPL CALCULATED.3IONS-SCNC: 7 MMOL/L (ref 7–15)
BUN SERPL-MCNC: 6.9 MG/DL (ref 8–23)
CALCIUM SERPL-MCNC: 8.7 MG/DL (ref 8.8–10.2)
CHLORIDE SERPL-SCNC: 108 MMOL/L (ref 98–107)
CREAT SERPL-MCNC: 0.61 MG/DL (ref 0.67–1.17)
DEPRECATED HCO3 PLAS-SCNC: 24 MMOL/L (ref 22–29)
ERYTHROCYTE [DISTWIDTH] IN BLOOD BY AUTOMATED COUNT: 13 % (ref 10–15)
GFR SERPL CREATININE-BSD FRML MDRD: >90 ML/MIN/1.73M2
GLUCOSE BLDC GLUCOMTR-MCNC: 168 MG/DL (ref 70–99)
GLUCOSE BLDC GLUCOMTR-MCNC: 198 MG/DL (ref 70–99)
GLUCOSE BLDC GLUCOMTR-MCNC: 227 MG/DL (ref 70–99)
GLUCOSE SERPL-MCNC: 226 MG/DL (ref 70–99)
HCT VFR BLD AUTO: 36.4 % (ref 40–53)
HGB BLD-MCNC: 12.4 G/DL (ref 13.3–17.7)
MCH RBC QN AUTO: 30.1 PG (ref 26.5–33)
MCHC RBC AUTO-ENTMCNC: 34.1 G/DL (ref 31.5–36.5)
MCV RBC AUTO: 88 FL (ref 78–100)
PLATELET # BLD AUTO: 150 10E3/UL (ref 150–450)
POTASSIUM SERPL-SCNC: 4 MMOL/L (ref 3.4–5.3)
RBC # BLD AUTO: 4.12 10E6/UL (ref 4.4–5.9)
SODIUM SERPL-SCNC: 139 MMOL/L (ref 136–145)
WBC # BLD AUTO: 5.2 10E3/UL (ref 4–11)

## 2022-11-04 PROCEDURE — 82310 ASSAY OF CALCIUM: CPT | Performed by: PHYSICIAN ASSISTANT

## 2022-11-04 PROCEDURE — 250N000011 HC RX IP 250 OP 636: Performed by: INTERNAL MEDICINE

## 2022-11-04 PROCEDURE — 250N000013 HC RX MED GY IP 250 OP 250 PS 637: Performed by: INTERNAL MEDICINE

## 2022-11-04 PROCEDURE — 250N000013 HC RX MED GY IP 250 OP 250 PS 637: Performed by: PHYSICIAN ASSISTANT

## 2022-11-04 PROCEDURE — 36415 COLL VENOUS BLD VENIPUNCTURE: CPT | Performed by: PHYSICIAN ASSISTANT

## 2022-11-04 PROCEDURE — 99239 HOSP IP/OBS DSCHRG MGMT >30: CPT | Performed by: INTERNAL MEDICINE

## 2022-11-04 PROCEDURE — 85027 COMPLETE CBC AUTOMATED: CPT | Performed by: PHYSICIAN ASSISTANT

## 2022-11-04 RX ORDER — INSULIN GLARGINE-YFGN 100 [IU]/ML
22 INJECTION, SOLUTION SUBCUTANEOUS AT BEDTIME
Qty: 15 ML | Refills: 0 | Status: SHIPPED | OUTPATIENT
Start: 2022-11-04 | End: 2023-01-18

## 2022-11-04 RX ORDER — INSULIN ASPART 100 [IU]/ML
10 INJECTION, SOLUTION INTRAVENOUS; SUBCUTANEOUS
Qty: 15 ML | Refills: 0 | Status: SHIPPED | OUTPATIENT
Start: 2022-11-04 | End: 2023-01-18

## 2022-11-04 RX ADMIN — POTASSIUM CHLORIDE AND SODIUM CHLORIDE: 900; 150 INJECTION, SOLUTION INTRAVENOUS at 09:25

## 2022-11-04 RX ADMIN — ASPIRIN 81 MG: 81 TABLET, COATED ORAL at 08:23

## 2022-11-04 RX ADMIN — PANTOPRAZOLE SODIUM 40 MG: 40 TABLET, DELAYED RELEASE ORAL at 06:42

## 2022-11-04 ASSESSMENT — ACTIVITIES OF DAILY LIVING (ADL)
ADLS_ACUITY_SCORE: 22

## 2022-11-04 NOTE — PROVIDER NOTIFICATION
MD Notified: Naya Francisco  TIME: At 10:56 PM    Paged provider with sodium 135, and Cr 0.65.     Will await any new orders.

## 2022-11-04 NOTE — TELEPHONE ENCOUNTER
Diabetes Education Scheduling Outreach #2:    Call to patient to schedule. Left message with phone number to call to schedule.    Erinn Hamilton  [facility] [facility]  Diabetes and Nutrition Scheduling

## 2022-11-04 NOTE — DISCHARGE SUMMARY
[facility]  Hospitalist Discharge Summary      Date of Admission:  11/2/2022  Date of Discharge:  11/4/2022  Discharging Provider: Ramírez Boss MD  Discharge Service: Hospitalist Service    Discharge Diagnoses   Diabetic ketoacidosis without coma associated with type 2 diabetes mellitus  Esophageal reflux  Abnormal chest CT      Follow-ups Needed After Discharge   Follow-up Appointments     Follow-up and recommended labs and tests       Follow up with primary care provider, Norah Harvey, within 7   days for hospital follow- up and to follow up on results.  The following   labs/tests are recommended: BMP in 1 week, chest CT scan within 12 months.           Additional follow-up instructions/to-do's for PCP : Referral was made to endocrinology clinic.    Unresulted Labs Ordered in the Past 30 Days of this Admission     No orders found from 10/3/2022 to 11/3/2022.          Discharge Disposition   Discharged to home  Condition at discharge: Stable      Hospital Course   Brent Stewart is a 62 year old male admitted on 11/2/2022. He presented to the emergency department for evaluation of abnormal labs noted in clinic, was found to be in mild HHS/DKA for which he is being admitted for further evaluation and treatment. Diabetes has been previously diagnosed, but diet controlled. Prior HgbA1c 7.5 - 8.1, now up to 14.2 on admission. Blood glucose on admission 326. VBG was technically normal, but nearly acidotic (pH 7.32, pCO2 45, pO2 26, bicarb 23). Anion gap 18, ketones 5.1 on admission. Glucose was trending down after fluids and insulin started in the emergency department (was given 10U Novolog and 20U Lantus). Patient's DKA was not severe enough to necessitate insulin infusion, so did not need ICU admission. IV fluids were continued and ketoacidosis gradually resolved. Blood glucose was in low 200's on the day of discharge from the hospital. Lantus was increased from 20 units  to 22 units at bedtime. Novolog was continued at 10 units TID with meals.    He received diabetic education at the hospital and was referred to endocrinology clinic after the discharge. He was instructed to  Keep log records of his blood glucose and bring it to his appointments.     During initial evaluation in the ER CT of chest/abdomen/pelvis was done. Pulmonary nodules were found incidentally. The patient was asymptomatic. Radiologist recommended 12 month follow up with CT.       Consultations This Hospital Stay   None    Code Status   Full Code    Time Spent on this Encounter   I, Ramírez Boss MD, personally saw the patient today and spent greater than 30 minutes discharging this patient.       Ramírez Boss MD  [facility] SURGICAL  [address] [address]  Phone: [phone]  Fax: [phone]  ______________________________________________________________________    Physical Exam   Vital Signs: Temp: 97.2  F (36.2  C) Temp src: Oral BP: 113/75 Pulse: 76   Resp: 18 SpO2: 98 % O2 Device: None (Room air)    Weight: 212 lbs 4.85 oz  Constitutional: awake, alert, cooperative, no apparent distress, and appears stated age  Eyes: Lids and lashes normal, pupils equal, round and reactive to light, extra ocular muscles intact, sclera clear, conjunctiva normal  ENT: Normocephalic, without obvious abnormality, atraumatic, external ears without lesions  Respiratory: No increased work of breathing, good air exchange, clear to auscultation bilaterally, no crackles or wheezing  Cardiovascular: Normal apical impulse, regular rate and rhythm, normal S1 and S2, no S3 or S4, and no murmur noted  GI: Normal bowel sounds, soft, non-distended, non-tender, no masses palpated, no hepatosplenomegaly  Skin: normal skin color, texture, turgor and no rashes  Musculoskeletal: There is no redness, warmth, or swelling of the joints.  Motor strength is 5 out of 5 all extremities bilaterally.   Tone is normal.  Neurologic: Awake, alert, oriented to name, place and time.  Cranial nerves II-XII are grossly intact.  Motor is 5 out of 5 bilaterally.  Sensory is grossly intact.        Primary Care Physician   Norah Harvey    Discharge Orders      Adult Endocrinology  Referral      Reason for your hospital stay    Diabetic ketoacidosis     Activity    Your activity upon discharge: activity as tolerated     Follow-up and recommended labs and tests     Follow up with primary care provider, Norah Harvey, within 7 days for hospital follow- up and to follow up on results.  The following labs/tests are recommended: BMP in 1 week, chest CT scan within 12 months.     Diet    Follow this diet upon discharge: Orders Placed This Encounter      Snacks/Supplements Adult: Glucerna; With Meals      Consistent Carbohydrate Diet Moderate Consistent Carb (60 g CHO per Meal) Diet       Significant Results and Procedures   Most Recent 3 CBC's:Recent Labs   Lab Test 11/04/22  0539 11/02/22  1717 11/01/22  1504   WBC 5.2 5.9 6.0   HGB 12.4* 14.8 13.6   MCV 88 88 90    178 156     Most Recent 3 BMP's:Recent Labs   Lab Test 11/04/22  0749 11/04/22  0539 11/04/22  0220 11/03/22  2152 11/03/22  2147 11/03/22  1721 11/03/22  1636 11/03/22  1418   NA  --  139  --  135*  --   --   --  134*   POTASSIUM  --  4.0  --  4.0  --  3.8  --  3.9   CHLORIDE  --  108*  --  103  --   --   --  99   CO2  --  24  --  23  --   --   --  26   BUN  --  6.9*  --  11.0  --   --   --  10.0   CR  --  0.61*  --  0.65*  --   --   --  0.75   ANIONGAP  --  7  --  9  --   --   --  9   INA  --  8.7*  --  8.8  --   --   --  8.9   * 226* 227* 209*   < >  --    < > 232*    < > = values in this interval not displayed.     Most Recent 2 LFT's:Recent Labs   Lab Test 11/01/22  1504 04/06/22  1126   AST 15 15   ALT 16 34   ALKPHOS 89 90   BILITOTAL 0.5 0.6     Most Recent Cholesterol Panel:Recent Labs   Lab Test 11/01/22  1504    CHOL 270*   *   HDL 35*   TRIG 515*     7-Day Micro Results     Collected Updated Procedure Result Status      11/02/2022 2125 11/02/2022 2152 Asymptomatic COVID-19 Virus (Coronavirus) by PCR Nose [[id number]]    Swab from Nose    Final result Component Value   SARS CoV2 PCR Negative   NEGATIVE: SARS-CoV-2 (COVID-19) RNA not detected, presumed negative.                Most Recent TSH and T4:Recent Labs   Lab Test 11/01/22  1504   TSH 1.12     Most Recent Hemoglobin A1c:Recent Labs   Lab Test 11/01/22  1504   A1C 14.2*     Most Recent Urinalysis:Recent Labs   Lab Test 11/02/22  1820   COLOR Straw   APPEARANCE Clear   URINEGLC 1000*   URINEBILI Negative   URINEKETONE 80*   SG 1.030   UBLD Negative   URINEPH 5.0   PROTEIN Negative   NITRITE Negative   LEUKEST Negative   RBCU 1   WBCU <1   ,   Results for orders placed or performed during the hospital encounter of 11/01/22   CT Chest/Abdomen/Pelvis w Contrast    Narrative    CT CHEST/ABDOMEN/PELVIS WITH CONTRAST  11/1/2022 2:59 PM    CLINICAL HISTORY: Weight loss.    TECHNIQUE: CT scan of the chest, abdomen, and pelvis was performed  following injection of IV contrast. Multiplanar reformats were  obtained. Dose reduction techniques were used.   CONTRAST: 90 mL Isovue 370    COMPARISON: Chest x-ray dated 10/5/2022.    FINDINGS:   LUNGS AND PLEURA: A few (less than 10) scattered 2 to 3 mm  noncalcified bilateral pulmonary nodules are noted, which are of  doubtful significance. No airspace consolidation or pleural fluid.  Minimal biapical pleuroparenchymal scarring is noted. Central airways  are patent.    MEDIASTINUM/AXILLAE: Heart size is normal. No pericardial effusion.  Thoracic aorta is normal in course and caliber. Mild thoracic aortic  atherosclerosis. No coronary artery atherosclerosis. No enlarged  thoracic lymph nodes.    HEPATOBILIARY: Gallbladder is decompressed and otherwise  normal-appearing. There is mild low-attenuation of the liver  parenchyma,  suggestive of fatty infiltration. No worrisome hepatic  observations. Liver contour is smooth. No bile duct dilation.    PANCREAS: Normal.    SPLEEN: Normal.    ADRENAL GLANDS: Normal.    KIDNEYS/BLADDER: There are several small bilateral subcentimeter  low-density renal cortical foci present, which are too small to  characterize, but statistically favor cysts and do not require  follow-up. A simple-appearing 14 mm right renal cortical cyst is  noted, which does not require follow-up. Focal renal cortical  thinning/scarring involving the posterior aspect of the left kidney is  noted. No solid enhancing renal mass. No nephrolithiasis or  hydronephrosis. Urinary bladder is normal-appearing.    BOWEL: No obstruction or inflammatory change.    PELVIC ORGANS: Prostate gland is mildly enlarged.    ADDITIONAL FINDINGS: Mild atherosclerosis seen throughout the  abdominal aorta and iliac arteries. No aneurysmal dilation.    MUSCULOSKELETAL: Small fat-containing noninflamed right inguinal  hernia. Mild degenerative changes of the spine. No acute osseous  abnormality. A suspected sebaceous cyst is seen in the mid back to the  right of midline measuring 14 mm (2-205).       Impression    IMPRESSION:  1.  No acute cardiopulmonary, intra-abdominal, or intrapelvic process.  2.  Several 2-3 mm noncalcified pulmonary nodules.  3.  Additional nonacute findings as above.    REFERENCE:  Guidelines for Management of Incidental Pulmonary Nodules Detected on  CT Images: From the Fleischner Society 2017.   Guidelines apply to incidental nodules in patients who are 35 years or  older.  Guidelines do not apply to lung cancer screening, patients with  immunosuppression, or patients with known primary cancer.    MULTIPLE NODULES  Nodule size <6 mm  Low-risk patients: No follow-up needed.  High-risk patients: Optional follow-up at 12 months.    Consider referral to lung nodule clinic.    SRINI GRIFFIN MD         SYSTEM ID:  [id number]        Discharge Medications   Current Discharge Medication List      START taking these medications    Details   insulin aspart (NOVOLOG FLEXPEN RELION) 100 UNIT/ML pen Inject 10 Units Subcutaneous 3 times daily (with meals)  Qty: 15 mL, Refills: 0    Associated Diagnoses: Type 2 diabetes mellitus with diabetic polyneuropathy, without long-term current use of insulin (H)      Insulin Glargine-yfgn (SEMGLEE, YFGN,) 100 UNIT/ML SOPN Inject 22 Units Subcutaneous At Bedtime  Qty: 15 mL, Refills: 0    Associated Diagnoses: Type 2 diabetes mellitus with diabetic polyneuropathy, without long-term current use of insulin (H)         CONTINUE these medications which have NOT CHANGED    Details   aspirin 81 MG EC tablet Take 81 mg by mouth daily      blood glucose (NO BRAND SPECIFIED) test strip Use to test blood sugar 2 times daily or as directed.  Qty: 200 strip, Refills: 11    Associated Diagnoses: Type 2 diabetes mellitus with diabetic polyneuropathy, without long-term current use of insulin (H)      MULTIPLE VITAMIN PO Take 1 tablet by mouth daily      OMEPRAZOLE PO Take 20 mg by mouth daily      sildenafil (REVATIO) 20 MG tablet Take 1-3 tablets (20-60 mg) by mouth daily as needed (1 hour prior to sexual activity)  Qty: 30 tablet, Refills: 4    Associated Diagnoses: Erectile dysfunction, unspecified erectile dysfunction type      triamcinolone (KENALOG) 0.1 % external ointment Apply topically 2 times daily  Qty: 30 g, Refills: 1    Associated Diagnoses: Eczema, unspecified type      vitamin B-12 (CYANOCOBALAMIN) 100 MCG tablet Take 100 mcg by mouth daily      vitamin D3 (CHOLECALCIFEROL) 50 mcg (2000 units) tablet Take 1 tablet by mouth daily         STOP taking these medications       acyclovir (ZOVIRAX) 400 MG tablet Comments:   Reason for Stopping:             Allergies   Allergies   Allergen Reactions     Hmg-Coa-R Inhibitors Unknown     Metoprolol Dizziness

## 2022-11-04 NOTE — PROGRESS NOTES
[facility] DISCHARGE NOTE    Patient discharged to home at 12:24 PM via ambulation. Accompanied by other: friend and staff. Discharge instructions reviewed with patient, opportunity offered to ask questions. Prescriptions sent to patients preferred pharmacy. All belongings sent with patient.    Melody Conway RN

## 2022-11-04 NOTE — PLAN OF CARE
"Goal Outcome Evaluation:    A&O x4. Independent in the room and ambulating in the hallways with IV pole. PIV infusing, fluids sent from pharmacy.  Blood sugars 140 and 198. Insulin teaching provided this shift. Patient eager and receptive to learning. Potassium protocol, recheck this shift was 3.8, recheck Potassium in the morning.     /54 (BP Location: Left arm)   Pulse 65   Temp 98.3  F (36.8  C) (Oral)   Resp 16   Ht 1.778 m (5' 10\")   Wt 96.3 kg (212 lb 4.9 oz)   SpO2 97%   BMI 30.46 kg/m                              "

## 2022-11-07 ENCOUNTER — PATIENT OUTREACH (OUTPATIENT)
Dept: CARE COORDINATION | Facility: CLINIC | Age: 62
End: 2022-11-07

## 2022-11-07 NOTE — PROGRESS NOTES
Clinic Care Coordination Contact  [facility]: Post-Discharge Note  SITUATION                                                      Admission:    Admission Date: 11/02/22   Reason for Admission: Hyperglycemia  Ketosis due to diabetes (H)  Increased anion gap metabolic acidosis  Discharge:   Discharge Date: 11/04/22  Discharge Diagnosis: Diabetic ketoacidosis without coma associated with type 2 diabetes mellitus  Esophageal reflux  Abnormal chest CT    BACKGROUND                                                      Per hospital discharge summary and inpatient provider notes:    Brent Stewart is a 62 year old male with history notable for hypertension, GERD, diabetes type 2, obesity, who was sent from clinic with concerns for diabetic ketoacidosis.  Patient states that she has been a diabetic for approximately 8 years but is not on any medications to control his blood sugar.  Reports that lately has been feeling very tired and dizzy.  Reports a 40 pound weight loss unintentional in the past several months.  Is very hungry and thirsty. Labs from yesterday show A1C of 14.2, this was previously 7.5 in April. Blood sugar was elevated to 458 and he had an anion gap acidosis.  Patient reports that his blood sugars have been poorly controlled recently and sometimes are greater than 600.  Patient had CT scan of chest abdomen pelvis yesterday with no acute cardiopulmonary, intra-abdominal, or intrapelvic process.  Several 2 to 3 mm noncalcified pulmonary nodules are identified.      Patient with no acute complaints at this time.  No fevers or chills.  No nausea or vomiting.  No diarrhea.  No abdominal pain.  Does occasionally have a cough and was apprised that they did not see anything concerning on CT scan from yesterday.  No dysuria, urgency or frequency.  No rash.  No extremity edema.     The patient's PMHx, Surgical Hx, Allergies, and Medications were all reviewed with the patient.      ASSESSMENT        "    Discharge Assessment  How are you doing now that you are home?: States he feels \"pretty darn good\".  Feels good, awake and not tired or dizzy.  How are your symptoms? (Red Flag symptoms escalate to triage hotline per guidelines): Improved  Do you feel your condition is stable enough to be safe at home until your provider visit?: Yes  Does the patient have their discharge instructions? : Yes  Does the patient have questions regarding their discharge instructions? : No  Were you started on any new medications or were there changes to any of your previous medications? : Yes  Does the patient have all of their medications?: Yes  Do you have questions regarding any of your medications? : No  Do you have all of your needed medical supplies or equipment (DME)?  (i.e. oxygen tank, CPAP, cane, etc.): Yes (Diabetic Supplies- Will discuss getting a new meter at diabetic education appt.)  Discharge follow-up appointment scheduled within 14 calendar days? : Yes  Discharge Follow Up Appointment Date: 11/10/22  Discharge Follow Up Appointment Scheduled with?: Specialty Care Provider (Diabetic Educator)         Post-op (Clinicians Only)  Did the patient have surgery or a procedure: No        PLAN                                                      Outpatient Plan:  Follow up with primary care provider, Norah Harvey, within 7   days for hospital follow- up and to follow up on results.  The following   labs/tests are recommended: BMP in 1 week, chest CT scan within 12 months.     Future Appointments   Date Time Provider Department Center   11/8/2022 11:00 AM Judy Vaca PA-C [facility]   11/10/2022  9:00 AM Heidi Freedman RD [facility]   12/7/2022  8:20 AM oNrah Harvey MD [facility]   1/18/2023  3:00 PM Manas Mahoney MD [facility]         For any urgent concerns, please contact our 24 hour nurse triage line: [phone] ([phone])         Sylvia Brannon, " RN

## 2022-11-08 ENCOUNTER — OFFICE VISIT (OUTPATIENT)
Dept: DERMATOLOGY | Facility: CLINIC | Age: 62
End: 2022-11-08
Payer: COMMERCIAL

## 2022-11-08 DIAGNOSIS — B35.2 TINEA MANUUM: ICD-10-CM

## 2022-11-08 DIAGNOSIS — B35.3 TINEA PEDIS OF BOTH FEET: Primary | ICD-10-CM

## 2022-11-08 DIAGNOSIS — L72.0 EPIDERMAL CYST: ICD-10-CM

## 2022-11-08 DIAGNOSIS — D18.01 CHERRY ANGIOMA: ICD-10-CM

## 2022-11-08 DIAGNOSIS — L81.4 LENTIGO: ICD-10-CM

## 2022-11-08 DIAGNOSIS — L82.0 INFLAMED SEBORRHEIC KERATOSIS: ICD-10-CM

## 2022-11-08 DIAGNOSIS — L82.1 SEBORRHEIC KERATOSIS: ICD-10-CM

## 2022-11-08 PROCEDURE — 99213 OFFICE O/P EST LOW 20 MIN: CPT | Mod: 25 | Performed by: PHYSICIAN ASSISTANT

## 2022-11-08 PROCEDURE — 17110 DESTRUCTION B9 LES UP TO 14: CPT | Performed by: PHYSICIAN ASSISTANT

## 2022-11-08 RX ORDER — CICLOPIROX OLAMINE 7.7 MG/G
CREAM TOPICAL
Qty: 90 G | Refills: 4 | Status: SHIPPED | OUTPATIENT
Start: 2022-11-08

## 2022-11-08 RX ORDER — TERBINAFINE HYDROCHLORIDE 250 MG/1
250 TABLET ORAL DAILY
Qty: 14 TABLET | Refills: 0 | Status: SHIPPED | OUTPATIENT
Start: 2022-11-08 | End: 2023-02-10

## 2022-11-08 ASSESSMENT — PAIN SCALES - GENERAL: PAINLEVEL: NO PAIN (0)

## 2022-11-08 NOTE — LETTER
11/8/2022         RE: Brnet Stewart  [address]  [facility] [address]        Dear Colleague,    Thank you for referring your patient, Brent Stewart, to the [facility]. Please see a copy of my visit note below.    Brent Stewart is an extremely pleasant 62 year old year old male patient here today for cyst on right cheek. He notes sometimes he is able to get some some drainage out, he reports bothersome. He would like removed if possible. No painful or bleeding skin lesions. Patient has no other skin complaints today.  Remainder of the HPI, Meds, PMH, Allergies, FH, and SH was reviewed in chart.    Pertinent Hx:  histoyr of skin cancer on left cheek   Past Medical History:   Diagnosis Date     Hypertension        Past Surgical History:   Procedure Laterality Date     ARTHROSCOPY KNEE RT/LT Right 7/14    meniscectomy     HERNIA REPAIR, UMBILICAL  1980     JOINT REPLACEMTN, KNEE RT/LT Right 11/2015    Joint Replacement knee RT/LT     PHACOEMULSIFICATION WITH STANDARD INTRAOCULAR LENS IMPLANT  6/19/2014    Procedure: PHACOEMULSIFICATION WITH STANDARD INTRAOCULAR LENS IMPLANT;  Surgeon: Issac Lee MD;  Location: [facility]     PHACOEMULSIFICATION WITH STANDARD INTRAOCULAR LENS IMPLANT  7/3/2014    Procedure: PHACOEMULSIFICATION WITH STANDARD INTRAOCULAR LENS IMPLANT;  Surgeon: Issac Lee MD;  Location: [facility] OR        Family History   Problem Relation Age of Onset     Diabetes Mother      Cerebrovascular Disease Mother      Diabetes Father      Cancer Father      Coronary Artery Disease Early Onset Father 55        CABG       Social History     Socioeconomic History     Marital status: Single     Spouse name: Not on file     Number of children: Not on file     Years of education: Not on file     Highest education level: Not on file   Occupational History     Not on file   Tobacco Use     Smoking status: Former     Types: Cigarettes     Quit date: 4/1/2005     Years since  quittin.6     Smokeless tobacco: Former   Substance and Sexual Activity     Alcohol use: Yes     Comment: occas.     Drug use: No     Sexual activity: Yes     Partners: Male   Other Topics Concern     Parent/sibling w/ CABG, MI or angioplasty before 65F 55M? Not Asked   Social History Narrative     Not on file     Social Determinants of Health     Financial Resource Strain: Not on file   Food Insecurity: Not on file   Transportation Needs: Not on file   Physical Activity: Not on file   Stress: Not on file   Social Connections: Not on file   Intimate Partner Violence: Not on file   Housing Stability: Not on file       Outpatient Encounter Medications as of 2022   Medication Sig Dispense Refill     aspirin 81 MG EC tablet Take 81 mg by mouth daily       blood glucose (NO BRAND SPECIFIED) test strip Use to test blood sugar 2 times daily or as directed. 200 strip 11     ciclopirox (LOPROX) 0.77 % cream Apply twice daily to hands and feet. 90 g 4     insulin aspart (NOVOLOG FLEXPEN RELION) 100 UNIT/ML pen Inject 10 Units Subcutaneous 3 times daily (with meals) 15 mL 0     Insulin Glargine-yfgn (SEMGLEE, YFGN,) 100 UNIT/ML SOPN Inject 22 Units Subcutaneous At Bedtime 15 mL 0     insulin pen needle (32G X 6 MM) 32G X 6 MM miscellaneous Use insulin pen needles daily or as directed. 110 each 0     MULTIPLE VITAMIN PO Take 1 tablet by mouth daily       OMEPRAZOLE PO Take 20 mg by mouth daily       sildenafil (REVATIO) 20 MG tablet Take 1-3 tablets (20-60 mg) by mouth daily as needed (1 hour prior to sexual activity) 30 tablet 4     terbinafine (LAMISIL) 250 MG tablet Take 1 tablet (250 mg) by mouth daily 14 tablet 0     vitamin B-12 (CYANOCOBALAMIN) 100 MCG tablet Take 100 mcg by mouth daily       vitamin D3 (CHOLECALCIFEROL) 50 mcg (2000 units) tablet Take 1 tablet by mouth daily       triamcinolone (KENALOG) 0.1 % external ointment Apply topically 2 times daily (Patient not taking: Reported on 2022) 30 g 1      No facility-administered encounter medications on file as of 11/8/2022.             O:   NAD, WDWN, Alert & Oriented, Mood & Affect wnl, Vitals stable   Here today alone   There were no vitals taken for this visit.   General appearance normal   Vitals stable   Alert, oriented and in no acute distress     Skin colored nodule on right cheek, neck, low back x 2   Pink scaly serpiginous plaque with scaly border central clearing, inflammatory papules  Stuck on papules and brown macules on trunk and ext   Red papules on trunk    The remainder of skin exam is normal       Eyes: Conjunctivae/lids:Normal     ENT: Lips: normal    MSK:Normal    Cardiovascular: peripheral edema none    Pulm: Breathing Normal    Neuro/Psych: Orientation:Alert and Orientedx3 ; Mood/Affect:normal   A/P:  1. Epidermal cyst on right cheek   Discussed excision, will schedule with Dr. Suazo for removal.   2. Tinea manuum and tinea pedis  Recent lfts normal.  Continue lamisil 250 x 14 days.   Apply ciclopirox twice daily as needed.   3. Inflamed seborrheic keratosis on left dorsal hand x 2, nsw x 1  LN2:  Treated with LN2 for 5s for 1-2 cycles. Warned risks of blistering, pain, pigment change, scarring, and incomplete resolution.  Advised patient to return if lesions do not completely resolve.  Wound care sheet given.  4. Seborrheic keratosis, lentigo, angioma, epidermal cyst, history of skin cancer.   It was a pleasure speaking to Brent Stewart today.  BENIGN LESIONS DISCUSSED WITH PATIENT:  I discussed the specifics of tumor, prognosis, and genetics of benign lesions.  I explained that treatment of these lesions would be purely cosmetic and not medically neccessary.  I discussed with patient different removal options including excision, cautery and /or laser.      Nature and genetics of benign skin lesions dicussed with patient.  Signs and Symptoms of skin cancer discussed with patient.  ABCDEs of melanoma reviewed with patient.  Patient  encouraged to perform monthly skin exams.  UV precautions reviewed with patient.  Risks of non-melanoma skin cancer discussed with patient   Return to clinic in one year or sooner if needed.         Again, thank you for allowing me to participate in the care of your patient.        Sincerely,        Judy Green PA-C

## 2022-11-08 NOTE — NURSING NOTE
Chief Complaint   Patient presents with     Skin Check     Back of neck & Right Temple, mid low back      Rash     Bilateral wrist, itchy        There were no vitals filed for this visit.  Wt Readings from Last 1 Encounters:   11/03/22 96.3 kg (212 lb 4.9 oz)       Lilia Segura LPN .................11/8/2022

## 2022-11-08 NOTE — PROGRESS NOTES
Brent Stewart is an extremely pleasant 62 year old year old male patient here today for cyst on right cheek. He notes sometimes he is able to get some some drainage out, he reports bothersome. He would like removed if possible. No painful or bleeding skin lesions. Patient has no other skin complaints today.  Remainder of the HPI, Meds, PMH, Allergies, FH, and SH was reviewed in chart.    Pertinent Hx:  histoyr of skin cancer on left cheek   Past Medical History:   Diagnosis Date     Hypertension        Past Surgical History:   Procedure Laterality Date     ARTHROSCOPY KNEE RT/LT Right     meniscectomy     HERNIA REPAIR, UMBILICAL  1980     JOINT REPLACEMTN, KNEE RT/LT Right 2015    Joint Replacement knee RT/LT     PHACOEMULSIFICATION WITH STANDARD INTRAOCULAR LENS IMPLANT  2014    Procedure: PHACOEMULSIFICATION WITH STANDARD INTRAOCULAR LENS IMPLANT;  Surgeon: Issac Lee MD;  Location: [facility]     PHACOEMULSIFICATION WITH STANDARD INTRAOCULAR LENS IMPLANT  7/3/2014    Procedure: PHACOEMULSIFICATION WITH STANDARD INTRAOCULAR LENS IMPLANT;  Surgeon: Issac Lee MD;  Location: [facility] OR        Family History   Problem Relation Age of Onset     Diabetes Mother      Cerebrovascular Disease Mother      Diabetes Father      Cancer Father      Coronary Artery Disease Early Onset Father 55        CABG       Social History     Socioeconomic History     Marital status: Single     Spouse name: Not on file     Number of children: Not on file     Years of education: Not on file     Highest education level: Not on file   Occupational History     Not on file   Tobacco Use     Smoking status: Former     Types: Cigarettes     Quit date: 2005     Years since quittin.6     Smokeless tobacco: Former   Substance and Sexual Activity     Alcohol use: Yes     Comment: occas.     Drug use: No     Sexual activity: Yes     Partners: Male   Other Topics Concern     Parent/sibling w/ CABG, MI or angioplasty  before 65F 55M? Not Asked   Social History Narrative     Not on file     Social Determinants of Health     Financial Resource Strain: Not on file   Food Insecurity: Not on file   Transportation Needs: Not on file   Physical Activity: Not on file   Stress: Not on file   Social Connections: Not on file   Intimate Partner Violence: Not on file   Housing Stability: Not on file       Outpatient Encounter Medications as of 11/8/2022   Medication Sig Dispense Refill     aspirin 81 MG EC tablet Take 81 mg by mouth daily       blood glucose (NO BRAND SPECIFIED) test strip Use to test blood sugar 2 times daily or as directed. 200 strip 11     ciclopirox (LOPROX) 0.77 % cream Apply twice daily to hands and feet. 90 g 4     insulin aspart (NOVOLOG FLEXPEN RELION) 100 UNIT/ML pen Inject 10 Units Subcutaneous 3 times daily (with meals) 15 mL 0     Insulin Glargine-yfgn (SEMGLEE, YFGN,) 100 UNIT/ML SOPN Inject 22 Units Subcutaneous At Bedtime 15 mL 0     insulin pen needle (32G X 6 MM) 32G X 6 MM miscellaneous Use insulin pen needles daily or as directed. 110 each 0     MULTIPLE VITAMIN PO Take 1 tablet by mouth daily       OMEPRAZOLE PO Take 20 mg by mouth daily       sildenafil (REVATIO) 20 MG tablet Take 1-3 tablets (20-60 mg) by mouth daily as needed (1 hour prior to sexual activity) 30 tablet 4     terbinafine (LAMISIL) 250 MG tablet Take 1 tablet (250 mg) by mouth daily 14 tablet 0     vitamin B-12 (CYANOCOBALAMIN) 100 MCG tablet Take 100 mcg by mouth daily       vitamin D3 (CHOLECALCIFEROL) 50 mcg (2000 units) tablet Take 1 tablet by mouth daily       triamcinolone (KENALOG) 0.1 % external ointment Apply topically 2 times daily (Patient not taking: Reported on 11/8/2022) 30 g 1     No facility-administered encounter medications on file as of 11/8/2022.             O:   NAD, WDWN, Alert & Oriented, Mood & Affect wnl, Vitals stable   Here today alone   There were no vitals taken for this visit.   General appearance  normal   Vitals stable   Alert, oriented and in no acute distress     Skin colored nodule on right cheek, neck, low back x 2   Pink scaly serpiginous plaque with scaly border central clearing, inflammatory papules  Stuck on papules and brown macules on trunk and ext   Red papules on trunk    The remainder of skin exam is normal       Eyes: Conjunctivae/lids:Normal     ENT: Lips: normal    MSK:Normal    Cardiovascular: peripheral edema none    Pulm: Breathing Normal    Neuro/Psych: Orientation:Alert and Orientedx3 ; Mood/Affect:normal   A/P:  1. Epidermal cyst on right cheek   Discussed excision, will schedule with Dr. Suazo for removal.   2. Tinea manuum and tinea pedis  Recent lfts normal.  Continue lamisil 250 x 14 days.   Apply ciclopirox twice daily as needed.   3. Inflamed seborrheic keratosis on left dorsal hand x 2, nsw x 1  LN2:  Treated with LN2 for 5s for 1-2 cycles. Warned risks of blistering, pain, pigment change, scarring, and incomplete resolution.  Advised patient to return if lesions do not completely resolve.  Wound care sheet given.  4. Seborrheic keratosis, lentigo, angioma, epidermal cyst, history of skin cancer.   It was a pleasure speaking to Brent Stewart today.  BENIGN LESIONS DISCUSSED WITH PATIENT:  I discussed the specifics of tumor, prognosis, and genetics of benign lesions.  I explained that treatment of these lesions would be purely cosmetic and not medically neccessary.  I discussed with patient different removal options including excision, cautery and /or laser.      Nature and genetics of benign skin lesions dicussed with patient.  Signs and Symptoms of skin cancer discussed with patient.  ABCDEs of melanoma reviewed with patient.  Patient encouraged to perform monthly skin exams.  UV precautions reviewed with patient.  Risks of non-melanoma skin cancer discussed with patient   Return to clinic in one year or sooner if needed.

## 2022-11-10 ENCOUNTER — VIRTUAL VISIT (OUTPATIENT)
Dept: EDUCATION SERVICES | Facility: CLINIC | Age: 62
End: 2022-11-10
Payer: COMMERCIAL

## 2022-11-10 DIAGNOSIS — E11.42 TYPE 2 DIABETES MELLITUS WITH DIABETIC POLYNEUROPATHY, WITHOUT LONG-TERM CURRENT USE OF INSULIN (H): Primary | ICD-10-CM

## 2022-11-10 PROCEDURE — G0108 DIAB MANAGE TRN  PER INDIV: HCPCS | Mod: 95 | Performed by: DIETITIAN, REGISTERED

## 2022-11-10 NOTE — PATIENT INSTRUCTIONS
Goals:  Practice healthy stress management and mindful eating - think are you physically hungry or are you bored, stressed, emotional etc, make of list of things to do besides eat.    Try to get good quality sleep with a goal of 7-8 hours per night.  Stay physically active daily.  Recommend working up to a total of 30 minutes on 5 days/ week.  Recommend a fitness tracker.     Eat in a healthy way- eliminate trans fats, limit saturated fats and added sugars; follow the plate method - picture above.  Keep a food record (My[employer], [employer]).    A meal is 3 or more food groups; make it colorful for better nutrition.    Total Carbohydrates (in grams) = Breakfast  30    Lunch  30    Supper  30    If desired snacks 15                  Lantus -increase to 22 units and continue to increase by 1 unit every evening until your morning blood glucose is between 80 and 130 mg/dL    NovoLog -10 units before all meals plus additional insulin if glucose is high  150 - 180 +1  181 - 210 +2  211 - 240 +3  241 - 270 +4  271 - 300 +5               Blood Glucose testing - Test before all meals    Before eating goal 80 - 130mg/dL  2 hours after goal 80 - 150mg/dL (<180 at any point)

## 2022-11-10 NOTE — LETTER
11/10/2022         RE: Brent Stewart  [address]  [facility] [address]        Dear Colleague,    Thank you for referring your patient, Brent Stewart, to the [facility]. Please see a copy of my visit note below.    Diabetes Self-Management Education & Support    Presents for: Individual review Type II diabetes     Type of Service: Telephone Visit    Originating Location (Patient Location): Home  Distant Location (Provider Location): [facility]  Mode of Communication:  Telephone    Telephone Visit Start Time: 8:55  Telephone Visit End Time (telephone visit stop time): 9:30    How would patient like to obtain AVS? Mail a copy    Assessment Type:   ASSESSMENT:  Patient with known history of type 2 diabetes for approximately 8 years.  Patient was seen in the clinic on 11/2/2022 by provider with complaints of 40 pound weight loss in 2 months.  Labs showing moderate DKA.  and was sent to the hospital    Admitted to the hospital on 11/2/2022 and discharged on 11/4/2022.  Patient discharged on Lantus 22 units at bedtime and NovoLog 10 units 3 times daily with meals.      Previous to hospitalization   A1c 7/23/2021 8.1%  A1c 4/6/2022 7.5%  At 1 point in 2021  It looks like patient had been on glipizide 5 mg daily 8/4/2021 - 9/20/2021 and was going to start on Trulicity 9/20/2021 by reviewing the chart it does not look like patient started that therapy and did not go back to taking glipizide.   Metformin -patient has a history of intolerance.    CT result- normal pancreas  A1c 14.2% equals estimated average glucose 361 mg/dL    Patient's most recent   Lab Results   Component Value Date    A1C 14.2 11/01/2022     is not meeting goal of <7.0    Diabetes knowledge and skills assessment:   Patient is knowledgeable in diabetes management concepts related to: Education started in the hospital with vague recall    Continue education with the following diabetes  "management concepts: Healthy Eating, Monitoring, Taking Medication and Problem Solving    Based on learning assessment above, most appropriate setting for further diabetes education would be: Individual setting.      PLAN  Lantus -increase to 22 units and continue to increase by 1 unit every evening until your morning blood glucose is between 80 and 130 mg/dL    NovoLog -10 units before all meals plus correction factor  150 - 180 +1  181 - 210 +2  211 - 240 +3  241 - 270 +4  271 - 300 +5             Blood glucose testing 3 times daily AC    Topics to cover at upcoming visits: Healthy Eating, Being Active, Monitoring, Taking Medication, Problem Solving, Reducing Risks and Healthy Coping reviewed with additional education    Follow-up: 1 week via telephone    See Care Plan for co-developed, patient-state behavior change goals.  AVS provided for patient today.    Education Materials Provided:  Carbohydrate Counting and My Plate Planner      SUBJECTIVE/OBJECTIVE:  Presents for: Individual review  Diabetes education in the past 24mo: No (in [place] in [place])  Diabetes type: Type 2  Disease course: Worsening  Transportation concerns: No  Cultural Influences/Ethnic Background:  Not  or     Diabetes Symptoms & Complications:  Fatigue: Sometimes  Neuropathy: No  Polydipsia: No  Polyphagia: Sometimes  Polyuria: No  Visual change: No  Slow healing wounds: No  Symptom course: Worsening  Complications assessed today?: No    Patient Problem List and Family Medical History reviewed for relevant medical history, current medical status, and diabetes risk factors.    Vitals:  There were no vitals taken for this visit.  Estimated body mass index is 30.46 kg/m  as calculated from the following:    Height as of 11/2/22: 1.778 m (5' 10\").    Weight as of 11/3/22: 96.3 kg (212 lb 4.9 oz).   Last 3 BP:   BP Readings from Last 3 Encounters:   11/04/22 (!) 142/79   11/02/22 128/84   10/05/22 (!) 158/88       History   Smoking " Status     Former     Types: Cigarettes     Quit date: 4/1/2005   Smokeless Tobacco     Former       Labs:  Lab Results   Component Value Date    A1C 14.2 11/01/2022     Lab Results   Component Value Date     11/04/2022     04/06/2022     Lab Results   Component Value Date    LDL  11/01/2022      Comment:      Cannot estimate LDL when triglyceride exceeds 400 mg/dL     11/01/2022     04/06/2022     Direct Measure HDL   Date Value Ref Range Status   11/01/2022 35 (L) >=40 mg/dL Final   ]  GFR Estimate   Date Value Ref Range Status   11/04/2022 >90 >60 mL/min/1.73m2 Final     Comment:     Effective December 21, 2021 eGFRcr in adults is calculated using the 2021 CKD-EPI creatinine equation which includes age and gender (Emile et al., NEJM, DOI: 10.1056/SRHGir6450121)     GFR, ESTIMATED POCT   Date Value Ref Range Status   11/01/2022 >60 >60 mL/min/1.73m2 Final     No results found for: GFRESTBLACK  Lab Results   Component Value Date    CR 0.61 11/04/2022     No results found for: MICROALBUMIN    Healthy Eating:  Healthy Eating Assessed Today: Yes  Cultural/Religion diet restrictions?: No  Meal planning/habits: Avoiding sweets, Calorie counting, Carb counting, Low salt  How many times a week on average do you eat food made away from home (restaurant/take-out)?: 1  Meals include: Dinner, Afternoon Snack, Evening Snack  Breakfast: egg vegetable omlet  Lunch: sandwich  Dinner: meat, potato, and if he remembers some vegetables  Snacks: likes apples,  Beverages: Water, Coffee  Has patient met with a dietitian in the past?: Yes    Being Active:  Being Active Assessed Today: Yes  Exercise:: Yes  Days per week of moderate to strenuous exercise (like a brisk walk): 4  On average, minutes per day of exercise at this level: 20  How intense was your typical exercise? : Light (like stretching or slow walking)  Exercise Minutes per Week: 80  Barrier to exercise: None    Monitoring:  Monitoring Assessed  Today: Yes  Did patient bring glucose meter to appointment? : No  Blood Glucose Meter: One Touch  Times checking blood sugar at home (number): 1  Times checking blood sugar at home (per): Day    A.m. glucose readings  363  252  232  237  323 this a.m.    Taking Medications:   Patient has been only taking 20 units of Lantus  Diabetes Medication(s)     Insulin       insulin aspart (NOVOLOG FLEXPEN RELION) 100 UNIT/ML pen    Inject 10 Units Subcutaneous 3 times daily (with meals)     Insulin Glargine-yfgn (SEMGLEE, YFGN,) 100 UNIT/ML SOPN    Inject 22 Units Subcutaneous At Bedtime          Taking Medication Assessed Today: Yes  Current Treatments: Insulin Injections  Dose schedule: Pre-breakfast, Pre-lunch, Pre-dinner, At bedtime  Given by: Patient  Injection/Infusion sites: Abdomen  Problems taking diabetes medications regularly?: No  Diabetes medication side effects?: No    Problem Solving:  Problem Solving Assessed Today: Yes  Is the patient at risk for hypoglycemia?: Yes  Hypoglycemia Frequency: Never  Hypoglycemia Treatment: Juice, Glucose (tablets or gel)    Reducing Risks:  Diabetes Risks: Age over 45 years, Family History, Hypertriglyceridemia  CAD Risks: Diabetes Mellitus, Male sex, Obesity, Family history    Healthy Coping:  Healthy Coping Assessed Today: Yes  Emotional response to diabetes: Acceptance  Informal Support system:: Family  Stage of change: ACTION (Actively working towards change)  Patient Activation Measure Survey Score:  No flowsheet data found.      Care Plan and Education Provided:  Care Plan: Diabetes   Updates made by Heidi Freedman RD since 11/10/2022 12:00 AM      Problem: HbA1C Not In Goal       Goal: Establish Regular Follow-Ups with PCP       Task: Discuss with PCP the recommended timing for patient's next follow up visit(s)    Responsible User: Heidi Freedman RD      Task: Discuss schedule for PCP visits with patient    Responsible User: Heidi Freedman RD      Goal: Get HbA1C Level  in Goal       Task: Educate patient on diabetes education self-management topics    Responsible User: Heidi Freedman RD      Task: Educate patient on benefits of regular glucose monitoring    Responsible User: Heidi Freedman RD      Task: Refer patient to appropriate extended care team member, as needed (Medication Therapy Management, Behavioral Health, Physical Therapy, etc.)    Responsible User: Heidi Freedman RD      Task: Discuss diabetes treatment plan with patient Completed 11/10/2022   Responsible User: Heidi Freedman RD      Problem: Diabetes Self-Management Education Needed to Optimize Self-Care Behaviors       Goal: Understand diabetes pathophysiology and disease progression       Task: Provide education on diabetes pathophysiology and disease progression specfic to patient's diabetes type    Responsible User: Heidi Freedman RD      Goal: Healthy Eating - follow a healthy eating pattern for diabetes       Task: Provide education on portion control and consistency in amount, composition and timing of food intake    Responsible User: Heidi Freedman RD      Task: Provide education on managing carbohydrate intake (carbohydrate counting, plate planning method, etc.) Completed 11/10/2022   Responsible User: Heidi Freedman RD      Task: Provide education on weight management    Responsible User: Heidi Freedman RD      Task: Provide education on heart healthy eating    Responsible User: Heidi Freedman RD      Task: Provide education on eating out    Responsible User: Heidi Freedman RD      Task: Develop individualized healthy eating plan with patient    Responsible User: Heidi Freedman RD      Goal: Being Active - get regular physical activity, working up to at least 150 minutes per week       Task: Provide education on relationship of activity to glucose and precautions to take if at risk for low glucose Completed 11/10/2022   Responsible User: Heidi Freedman RD      Task: Discuss barriers to physical  activity with patient    Responsible User: Heidi rFeedman RD      Task: Develop physical activity plan with patient    Responsible User: Heidi Freedman RD      Task: Explore community resources including walking groups, assistance programs, and home videos    Responsible User: Heidi Freedman RD      Goal: Monitoring - monitor glucose and ketones as directed       Task: Provide education on blood glucose monitoring (purpose, proper technique, frequency, glucose targets, interpreting results, when to use glucose control solution, sharps disposal) Completed 11/10/2022   Responsible User: Heidi Freedman RD      Task: Provide education on continuous glucose monitoring (sensor placement, use of blake or /reader, understanding glucose trends, alerts and alarms, differences between sensor glucose and blood glucose)    Responsible User: Heidi Freedman RD      Task: Provide education on ketone monitoring (when to monitor, frequency, etc.)    Responsible User: Heidi Freedman RD      Goal: Taking Medication - patient is consistently taking medications as directed    This Visit's Progress: 80%   Note:    Patient to take insulin as directed rapid acting insulin before every meal, long-acting insulin at at bedtime     Task: Provide education on action of prescribed medication, including when to take and possible side effects    Responsible User: Heidi Freedman RD      Task: Provide education on insulin and injectable diabetes medications, including administration, storage, site selection and rotation for injection sites Completed 11/10/2022   Responsible User: Heidi Freedman RD      Task: Discuss barriers to medication adherence with patient and provide management technique ideas as appropriate    Responsible User: Heidi Freedman RD      Task: Provide education on frequency and refill details of medications    Responsible User: Heidi Freedman RD      Goal: Problem Solving - know how to prevent and manage short-term  diabetes complications       Task: Provide education on high blood glucose - causes, signs/symptoms, prevention and treatment Completed 11/10/2022   Responsible User: Heidi Freedman RD      Task: Provide education on low blood glucose - causes, signs/symptoms, prevention, treatment, carrying a carbohydrate source at all times, and medical identification Completed 11/10/2022   Responsible User: Heidi Freedman RD      Task: Provide education on safe travel with diabetes    Responsible User: Heidi Freedman RD      Task: Provide education on how to care for diabetes on sick days    Responsible User: Heidi Freedman RD      Task: Provide education on when to call a health care provider    Responsible User: Heidi Freedman RD      Goal: Reducing Risks - know how to prevent and treat long-term diabetes complications       Task: Provide education on major complications of diabetes, prevention, early diagnostic measures and treatment of complications    Responsible User: Heidi Freedman RD      Task: Provide education on recommended care for dental, eye and foot health    Responsible User: Heidi Freedman RD      Task: Provide education on Hemoglobin A1c - goals and relationship to blood glucose levels Completed 11/10/2022   Responsible User: Heidi Freedman RD      Task: Provide education on recommendations for heart health - lipid levels and goals, blood pressure and goals, and aspirin therapy, if indicated    Responsible User: Heidi Freedman RD      Task: Provide education on tobacco cessation    Responsible User: Heidi Freedman RD      Goal: Healthy Coping - use available resources to cope with the challenges of managing diabetes       Task: Discuss recognizing feelings about having diabetes    Responsible User: Heidi Freedman RD      Task: Provide education on the benefits of making appropriate lifestyle changes Completed 11/10/2022   Responsible User: Heidi Freedman RD      Task: Provide education on benefits of  utilizing support systems    Responsible User: Heidi Freedman RD      Task: Discuss methods for coping with stress    Responsible User: Heidi Freedman RD      Task: Provide education on when to seek professional counseling    Responsible User: Heidi Freedman RD            Time Spent: 30 minutes billable time  Encounter Type: Individual via phone    Any diabetes medication dose changes were made via the CDE Protocol per the patient's referring provider. A copy of this encounter was shared with the provider.

## 2022-11-17 ENCOUNTER — VIRTUAL VISIT (OUTPATIENT)
Dept: EDUCATION SERVICES | Facility: CLINIC | Age: 62
End: 2022-11-17
Payer: COMMERCIAL

## 2022-11-17 DIAGNOSIS — E11.42 TYPE 2 DIABETES MELLITUS WITH DIABETIC POLYNEUROPATHY, WITHOUT LONG-TERM CURRENT USE OF INSULIN (H): Primary | ICD-10-CM

## 2022-11-17 PROCEDURE — G0108 DIAB MANAGE TRN  PER INDIV: HCPCS | Mod: 95 | Performed by: DIETITIAN, REGISTERED

## 2022-11-17 RX ORDER — GLUCAGON INJECTION, SOLUTION 1 MG/.2ML
1 INJECTION, SOLUTION SUBCUTANEOUS PRN
Qty: 0.4 ML | Refills: 0 | Status: SHIPPED | OUTPATIENT
Start: 2022-11-17

## 2022-11-17 NOTE — PROGRESS NOTES
Diabetes Self-Management Education & Support    Presents for: Individual review Type II diabetes      Type of Service: Telephone Visit     Originating Location (Patient Location): Home  Distant Location (Provider Location): [facility]  Mode of Communication:  Telephone     Telephone Visit Start Time: 10:00  Telephone Visit End Time (telephone visit stop time): 10:41     How would patient like to obtain AVS? Mail a copy  Assessment Type:   ASSESSMENT:  11/10/2022  Patient with known history of type 2 diabetes for approximately 8 years.  Patient was seen in the clinic on 11/10/2022  11/2/2022 by provider with complaints of 40 pound weight loss in 2 months.  Labs showing moderate DKA and was sent to the hospital     Admitted to the hospital on 11/2/2022 and discharged on 11/4/2022.  Patient discharged on Lantus 22 units at bedtime and NovoLog 10 units 3 times daily with meals.       Previous to hospitalization   A1c 7/23/2021 8.1%  A1c 4/6/2022 7.5%  At 1 point in 2021  It looks like patient had been on glipizide 5 mg daily 8/4/2021 - 9/20/2021 and was going to start on Trulicity 9/20/2021 by reviewing the chart it does not look like patient started that therapy and did not go back to taking glipizide.   Metformin -patient has a history of intolerance.     CT result- normal pancreas  A1c 14.2% equals estimated average glucose 361 mg/dL    11/17/2022  Follow up education/ evaluation of glucose readings/ insulin management.      Lantus -patient only increased from 22 units to 23 units and did not increase as directed ongoing until a.m. readings between 80 and 130.  Today reeducation on Lantus titration recommendations   a.m. glucose readings continue to be elevated 205, 225, 235 mg/dL     NovoLog patient is taking as directed and is using the correction scale appropriately.  NovoLog -10 units before all meals plus correction factor  150 - 180 +1  181 - 210 +2  211 - 240 +3  241 - 270 +4  271  - 300 +5              Patient did likely have an episode of hypoglycemia recently with symptoms of feeling shaky.  Patient did treat with juice and felt better.  When reviewing intake and insulin dosing patient had consumed a lower carbohydrate breakfast than usual.  I discussed options for varying NovoLog dosing based on intake, will not change to insulin to carb ratio yet but will have patient decrease dose by 2 units when intake is going to be a smaller amount of carbohydrate.    Education on severe hypoglycemia we will order Gvoke (glucagon injection).  Patient will print off discount card at home from website.    Blood glucose testing 3 times daily AC    Patient's most recent   Lab Results   Component Value Date    A1C 14.2 11/01/2022     is not meeting goal of <7.0    Diabetes knowledge and skills assessment:   Patient is knowledgeable in diabetes management concepts related to: Healthy Eating, Being Active and Monitoring    Continue education with the following diabetes management concepts: Healthy Eating, Being Active, Monitoring, Taking Medication, Problem Solving, Reducing Risks and Healthy Coping    Based on learning assessment above, most appropriate setting for further diabetes education would be: Individual setting.      PLAN    Lantus -increase to 24 units and continue to increase by 1 unit every evening until your morning blood glucose is between 80 and 130 mg/dL     NovoLog -10 units before all meals plus correction factor (can decrease to 8 units if lower carbohydrate meal)  150 - 180 +1  181 - 210 +2  211 - 240 +3  241 - 270 +4  271 - 300 +5              Blood glucose testing 3 times daily AC    Topics to cover at upcoming visits: Monitoring and Problem Solving    Follow-up: 1 month     See Care Plan for co-developed, patient-state behavior change goals.  AVS provided for patient today.    Education Materials Provided:  Medication Information on Gvoke      SUBJECTIVE/OBJECTIVE:     Cultural  "Influences/Ethnic Background:  Not  or       Diabetes Symptoms & Complications:          Patient Problem List and Family Medical History reviewed for relevant medical history, current medical status, and diabetes risk factors.    Vitals:  There were no vitals taken for this visit.  Estimated body mass index is 30.46 kg/m  as calculated from the following:    Height as of 11/2/22: 1.778 m (5' 10\").    Weight as of 11/3/22: 96.3 kg (212 lb 4.9 oz).   Last 3 BP:   BP Readings from Last 3 Encounters:   11/04/22 (!) 142/79   11/02/22 128/84   10/05/22 (!) 158/88       History   Smoking Status     Former     Types: Cigarettes     Quit date: 4/1/2005   Smokeless Tobacco     Former       Labs:  Lab Results   Component Value Date    A1C 14.2 11/01/2022     Lab Results   Component Value Date     11/04/2022     04/06/2022     Lab Results   Component Value Date    LDL  11/01/2022      Comment:      Cannot estimate LDL when triglyceride exceeds 400 mg/dL     11/01/2022     04/06/2022     Direct Measure HDL   Date Value Ref Range Status   11/01/2022 35 (L) >=40 mg/dL Final   ]  GFR Estimate   Date Value Ref Range Status   11/04/2022 >90 >60 mL/min/1.73m2 Final     Comment:     Effective December 21, 2021 eGFRcr in adults is calculated using the 2021 CKD-EPI creatinine equation which includes age and gender (Emile cox al., NEJ, DOI: 10.1056/WPXJoc0634284)     GFR, ESTIMATED POCT   Date Value Ref Range Status   11/01/2022 >60 >60 mL/min/1.73m2 Final     No results found for: GFRESTBLACK  Lab Results   Component Value Date    CR 0.61 11/04/2022     No results found for: MICROALBUMIN    Healthy Eating:  Healthy Eating Assessed Today: Yes  Cultural/Denominational diet restrictions?: No  Meal planning/habits: Avoiding sweets, Calorie counting, Carb counting, Low salt  How many times a week on average do you eat food made away from home (restaurant/take-out)?: 1  Meals include: Dinner, Afternoon " Snack, Evening Snack  Breakfast: egg vegetable omlet  Lunch: sandwich  Dinner: meat, potato, and if he remembers some vegetables  Snacks: likes apples,  Other: has been doing keto diet  Beverages: Water, Coffee  Has patient met with a dietitian in the past?: Yes    Being Active:  Being Active Assessed Today: Yes  Exercise:: Yes  Days per week of moderate to strenuous exercise (like a brisk walk): 4  On average, minutes per day of exercise at this level: 20  How intense was your typical exercise? : Light (like stretching or slow walking)  Exercise Minutes per Week: 80  Barrier to exercise: None    Monitoring:  Monitoring Assessed Today: Yes  Did patient bring glucose meter to appointment? : No  Blood Glucose Meter: One Touch, FreeStyle  Times checking blood sugar at home (number): 3  Times checking blood sugar at home (per): Day  Blood glucose trend: Decreasing    AM readings 205 - 235mg/dL, evening readings 124 - 173 mg/dL    Taking Medications:  Diabetes Medication(s)     Insulin       insulin aspart (NOVOLOG FLEXPEN RELION) 100 UNIT/ML pen    Inject 10 Units Subcutaneous 3 times daily (with meals)     Insulin Glargine-yfgn (SEMGLEE, YFGN,) 100 UNIT/ML SOPN    Inject 22 Units Subcutaneous At Bedtime      Lantus -increase to 24 units and continue to increase by 1 unit every evening until your morning blood glucose is between 80 and 130 mg/dL     NovoLog -10 units before all meals plus correction factor (patient to decrease to 8 units if consuming lower carbohydrate or smaller portion meal)  150 - 180 +1  181 - 210 +2  211 - 240 +3  241 - 270 +4  271 - 300 +5              Blood glucose testing 3 times daily AC    Taking Medication Assessed Today: Yes  Current Treatments: Insulin Injections  Dose schedule: Pre-breakfast, Pre-lunch, Pre-dinner, At bedtime  Given by: Patient  Injection/Infusion sites: Abdomen  Problems taking diabetes medications regularly?: No  Diabetes medication side effects?: No    Problem  Solving:  Problem Solving Assessed Today: Yes  Is the patient at risk for hypoglycemia?: Yes  Hypoglycemia Frequency: Never  Hypoglycemia Treatment: Juice, Glucose (tablets or gel)  Does patient have glucagon emergency kit?:  (ordering today)    Hypoglycemia symptoms  Confusion: No  Dizziness or Light-Headedness: No  Headaches: No  Hunger: No  Mood changes: No  Nervousness/Anxiety: No  Sleepiness: No  Speech difficulty: No  Sweats: No  Feeling shaky: Yes    Hypoglycemia Complications  Blackouts: No  Hospitalization: No  Nocturnal hypoglycemia: No  Required assistance: No  Required glucagon injection: No  Seizures: No    Reducing Risks:  Diabetes Risks: Age over 45 years, Family History, Hypertriglyceridemia  CAD Risks: Diabetes Mellitus, Male sex, Obesity, Family history    Healthy Coping:  Healthy Coping Assessed Today: Yes  Emotional response to diabetes: Acceptance  Informal Support system:: Family  Stage of change: ACTION (Actively working towards change)  Support resources: Websites  Patient Activation Measure Survey Score:  No flowsheet data found.      Care Plan and Education Provided:  Care Plan: Diabetes   Updates made by Heidi Freedman RD since 11/17/2022 12:00 AM      Problem: HbA1C Not In Goal       Goal: Establish Regular Follow-Ups with PCP       Task: Discuss schedule for PCP visits with patient Completed 11/17/2022   Responsible User: Heidi Freedman RD      Goal: Get HbA1C Level in Goal       Task: Educate patient on diabetes education self-management topics Completed 11/17/2022   Responsible User: Heidi Freedman RD      Task: Educate patient on benefits of regular glucose monitoring Completed 11/17/2022   Responsible User: Heidi Freedman RD      Problem: Diabetes Self-Management Education Needed to Optimize Self-Care Behaviors       Goal: Healthy Eating - follow a healthy eating pattern for diabetes       Task: Provide education on heart healthy eating Completed 11/17/2022   Responsible User:  Heidi Freedman RD      Goal: Monitoring - monitor glucose and ketones as directed       Task: Provide education on continuous glucose monitoring (sensor placement, use of blake or /reader, understanding glucose trends, alerts and alarms, differences between sensor glucose and blood glucose)    Responsible User: Heidi Freedman RD   Note:    Discussed option for CGMS, pt will think about it     Goal: Taking Medication - patient is consistently taking medications as directed    This Visit's Progress: 100%   Recent Progress: 80%   Note:    Patient to take insulin as directed rapid acting insulin before every meal, long-acting insulin at at bedtime     Goal: Reducing Risks - know how to prevent and treat long-term diabetes complications       Task: Provide education on recommended care for dental, eye and foot health Completed 11/17/2022   Responsible User: Heidi Freedman RD      Task: Provide education on recommendations for heart health - lipid levels and goals, blood pressure and goals, and aspirin therapy, if indicated Completed 11/17/2022   Responsible User: Heidi Freedman RD      Goal: Healthy Coping - use available resources to cope with the challenges of managing diabetes       Task: Discuss methods for coping with stress Completed 11/17/2022   Responsible User: Heidi Freedman RD            Time Spent: 30 minutes billable time  Encounter Type: Individual via phone    Any diabetes medication dose changes were made via the CDE Protocol per the patient's referring provider. A copy of this encounter was shared with the provider.

## 2022-11-17 NOTE — PATIENT INSTRUCTIONS
"Lantus -increase to 24 units and continue to increase by 1 unit every evening until your  morning blood glucose is between 80 and 130 mg/dL    NovoLog -10 units before all meals plus additional insulin if glucose is high   If you are consuming a smaller meal or lower carbohydrate than usual intake decrease at least by 2 units  150 - 180 +1  181 - 210 +2  211 - 240 +3  241 - 270 +4  271 - 300 +5    Blood Glucose testing - Test before all meals  Before eating goal 80 - 130mg/dL  2 hours after goal 80 - 150mg/dL (<180 at any point)    Injection Site Tips  The drawing here gives you a \"map\" of the best sites for injection. These include the thighs, abdomen (belly area) and the sides and backs of the upper arms. These areas have a layer of fat between the skin and muscle.      When you choose a site, follow these tips:  Use a different site with each shot.  You may stay within the same area (for example, the belly area), but be sure the next site is at least one finger-width away.  Wait two weeks before injecting into the same site again.  The abdomen (belly area) is usually the best area. Be sure to stay at least two inches away from the navel (belly button).  If you see any unusual lumps, tenderness or bleeding after a shot, call your doctor.    For informational purposes only. Not to replace the advice of your health care provider.   Copyright   2007 [employer] [facility]. All rights reserved. Clinically reviewed by [employer] [facility]. [email or web address] [id number] - REV 05/18.          Goals:  Practice healthy stress management and mindful eating - think are you physically hungry or are you bored, stressed, emotional etc, make of list of things to do besides eat.    Try to get good quality sleep with a goal of 7-8 hours per night.  Stay physically active daily.  Recommend working up to a total of 30 minutes on 5 days/ week.  Recommend a fitness tracker.     Eat in a healthy way- eliminate trans fats, limit " saturated fats and added sugars; follow the plate method - picture above.  Keep a food record (MyFitnessPal, [employer]).    A meal is 3 or more food groups; make it colorful for better nutrition.    Total Carbohydrates (in grams) = Breakfast  30    Lunch  30    Supper  30    If desired snacks 15

## 2022-11-17 NOTE — LETTER
11/17/2022         RE: Brent Stewart  [address]  [facility] [address]        Dear Colleague,    Thank you for referring your patient, Brent Stewart, to the [facility]. Please see a copy of my visit note below.    Diabetes Self-Management Education & Support    Presents for: Individual review Type II diabetes      Type of Service: Telephone Visit     Originating Location (Patient Location): Home  Distant Location (Provider Location): [facility]  Mode of Communication:  Telephone     Telephone Visit Start Time: 10:00  Telephone Visit End Time (telephone visit stop time): 10:41     How would patient like to obtain AVS? Mail a copy  Assessment Type:   ASSESSMENT:  11/10/2022  Patient with known history of type 2 diabetes for approximately 8 years.  Patient was seen in the clinic on 11/10/2022  11/2/2022 by provider with complaints of 40 pound weight loss in 2 months.  Labs showing moderate DKA and was sent to the hospital     Admitted to the hospital on 11/2/2022 and discharged on 11/4/2022.  Patient discharged on Lantus 22 units at bedtime and NovoLog 10 units 3 times daily with meals.       Previous to hospitalization   A1c 7/23/2021 8.1%  A1c 4/6/2022 7.5%  At 1 point in 2021  It looks like patient had been on glipizide 5 mg daily 8/4/2021 - 9/20/2021 and was going to start on Trulicity 9/20/2021 by reviewing the chart it does not look like patient started that therapy and did not go back to taking glipizide.   Metformin -patient has a history of intolerance.     CT result- normal pancreas  A1c 14.2% equals estimated average glucose 361 mg/dL    11/17/2022  Follow up education/ evaluation of glucose readings/ insulin management.      Lantus -patient only increased from 22 units to 23 units and did not increase as directed ongoing until a.m. readings between 80 and 130.  Today reeducation on Lantus titration recommendations   a.m. glucose  readings continue to be elevated 205, 225, 235 mg/dL     NovoLog patient is taking as directed and is using the correction scale appropriately.  NovoLog -10 units before all meals plus correction factor  150 - 180 +1  181 - 210 +2  211 - 240 +3  241 - 270 +4  271 - 300 +5              Patient did likely have an episode of hypoglycemia recently with symptoms of feeling shaky.  Patient did treat with juice and felt better.  When reviewing intake and insulin dosing patient had consumed a lower carbohydrate breakfast than usual.  I discussed options for varying NovoLog dosing based on intake, will not change to insulin to carb ratio yet but will have patient decrease dose by 2 units when intake is going to be a smaller amount of carbohydrate.    Education on severe hypoglycemia we will order Gvoke (glucagon injection).  Patient will print off discount card at home from website.    Blood glucose testing 3 times daily AC    Patient's most recent   Lab Results   Component Value Date    A1C 14.2 11/01/2022     is not meeting goal of <7.0    Diabetes knowledge and skills assessment:   Patient is knowledgeable in diabetes management concepts related to: Healthy Eating, Being Active and Monitoring    Continue education with the following diabetes management concepts: Healthy Eating, Being Active, Monitoring, Taking Medication, Problem Solving, Reducing Risks and Healthy Coping    Based on learning assessment above, most appropriate setting for further diabetes education would be: Individual setting.      PLAN    Lantus -increase to 24 units and continue to increase by 1 unit every evening until your morning blood glucose is between 80 and 130 mg/dL     NovoLog -10 units before all meals plus correction factor (can decrease to 8 units if lower carbohydrate meal)  150 - 180 +1  181 - 210 +2  211 - 240 +3  241 - 270 +4  271 - 300 +5              Blood glucose testing 3 times daily AC    Topics to cover at upcoming visits:  "Monitoring and Problem Solving    Follow-up: 1 month     See Care Plan for co-developed, patient-state behavior change goals.  AVS provided for patient today.    Education Materials Provided:  Medication Information on Gvoke      SUBJECTIVE/OBJECTIVE:     Cultural Influences/Ethnic Background:  Not  or       Diabetes Symptoms & Complications:          Patient Problem List and Family Medical History reviewed for relevant medical history, current medical status, and diabetes risk factors.    Vitals:  There were no vitals taken for this visit.  Estimated body mass index is 30.46 kg/m  as calculated from the following:    Height as of 11/2/22: 1.778 m (5' 10\").    Weight as of 11/3/22: 96.3 kg (212 lb 4.9 oz).   Last 3 BP:   BP Readings from Last 3 Encounters:   11/04/22 (!) 142/79   11/02/22 128/84   10/05/22 (!) 158/88       History   Smoking Status     Former     Types: Cigarettes     Quit date: 4/1/2005   Smokeless Tobacco     Former       Labs:  Lab Results   Component Value Date    A1C 14.2 11/01/2022     Lab Results   Component Value Date     11/04/2022     04/06/2022     Lab Results   Component Value Date    LDL  11/01/2022      Comment:      Cannot estimate LDL when triglyceride exceeds 400 mg/dL     11/01/2022     04/06/2022     Direct Measure HDL   Date Value Ref Range Status   11/01/2022 35 (L) >=40 mg/dL Final   ]  GFR Estimate   Date Value Ref Range Status   11/04/2022 >90 >60 mL/min/1.73m2 Final     Comment:     Effective December 21, 2021 eGFRcr in adults is calculated using the 2021 CKD-EPI creatinine equation which includes age and gender (Emile et al., NEJM, DOI: 10.1056/AHZSbw3190601)     GFR, ESTIMATED POCT   Date Value Ref Range Status   11/01/2022 >60 >60 mL/min/1.73m2 Final     No results found for: GFRESTBLACK  Lab Results   Component Value Date    CR 0.61 11/04/2022     No results found for: MICROALBUMIN    Healthy Eating:  Healthy Eating Assessed " Today: Yes  Cultural/Latter-day diet restrictions?: No  Meal planning/habits: Avoiding sweets, Calorie counting, Carb counting, Low salt  How many times a week on average do you eat food made away from home (restaurant/take-out)?: 1  Meals include: Dinner, Afternoon Snack, Evening Snack  Breakfast: egg vegetable omlet  Lunch: sandwich  Dinner: meat, potato, and if he remembers some vegetables  Snacks: likes apples,  Other: has been doing keto diet  Beverages: Water, Coffee  Has patient met with a dietitian in the past?: Yes    Being Active:  Being Active Assessed Today: Yes  Exercise:: Yes  Days per week of moderate to strenuous exercise (like a brisk walk): 4  On average, minutes per day of exercise at this level: 20  How intense was your typical exercise? : Light (like stretching or slow walking)  Exercise Minutes per Week: 80  Barrier to exercise: None    Monitoring:  Monitoring Assessed Today: Yes  Did patient bring glucose meter to appointment? : No  Blood Glucose Meter: One Touch, FreeStyle  Times checking blood sugar at home (number): 3  Times checking blood sugar at home (per): Day  Blood glucose trend: Decreasing    AM readings 205 - 235mg/dL, evening readings 124 - 173 mg/dL    Taking Medications:  Diabetes Medication(s)     Insulin       insulin aspart (NOVOLOG FLEXPEN RELION) 100 UNIT/ML pen    Inject 10 Units Subcutaneous 3 times daily (with meals)     Insulin Glargine-yfgn (SEMGLEE, YFGN,) 100 UNIT/ML SOPN    Inject 22 Units Subcutaneous At Bedtime      Lantus -increase to 24 units and continue to increase by 1 unit every evening until your morning blood glucose is between 80 and 130 mg/dL     NovoLog -10 units before all meals plus correction factor (patient to decrease to 8 units if consuming lower carbohydrate or smaller portion meal)  150 - 180 +1  181 - 210 +2  211 - 240 +3  241 - 270 +4  271 - 300 +5              Blood glucose testing 3 times daily AC    Taking Medication Assessed Today:  Yes  Current Treatments: Insulin Injections  Dose schedule: Pre-breakfast, Pre-lunch, Pre-dinner, At bedtime  Given by: Patient  Injection/Infusion sites: Abdomen  Problems taking diabetes medications regularly?: No  Diabetes medication side effects?: No    Problem Solving:  Problem Solving Assessed Today: Yes  Is the patient at risk for hypoglycemia?: Yes  Hypoglycemia Frequency: Never  Hypoglycemia Treatment: Juice, Glucose (tablets or gel)  Does patient have glucagon emergency kit?:  (ordering today)    Hypoglycemia symptoms  Confusion: No  Dizziness or Light-Headedness: No  Headaches: No  Hunger: No  Mood changes: No  Nervousness/Anxiety: No  Sleepiness: No  Speech difficulty: No  Sweats: No  Feeling shaky: Yes    Hypoglycemia Complications  Blackouts: No  Hospitalization: No  Nocturnal hypoglycemia: No  Required assistance: No  Required glucagon injection: No  Seizures: No    Reducing Risks:  Diabetes Risks: Age over 45 years, Family History, Hypertriglyceridemia  CAD Risks: Diabetes Mellitus, Male sex, Obesity, Family history    Healthy Coping:  Healthy Coping Assessed Today: Yes  Emotional response to diabetes: Acceptance  Informal Support system:: Family  Stage of change: ACTION (Actively working towards change)  Support resources: Websites  Patient Activation Measure Survey Score:  No flowsheet data found.      Care Plan and Education Provided:  Care Plan: Diabetes   Updates made by Heidi Freedman RD since 11/17/2022 12:00 AM      Problem: HbA1C Not In Goal       Goal: Establish Regular Follow-Ups with PCP       Task: Discuss schedule for PCP visits with patient Completed 11/17/2022   Responsible User: Heidi Freedman RD      Goal: Get HbA1C Level in Goal       Task: Educate patient on diabetes education self-management topics Completed 11/17/2022   Responsible User: Heidi Freedman RD      Task: Educate patient on benefits of regular glucose monitoring Completed 11/17/2022   Responsible User: Tano  MILIND Gordon      Problem: Diabetes Self-Management Education Needed to Optimize Self-Care Behaviors       Goal: Healthy Eating - follow a healthy eating pattern for diabetes       Task: Provide education on heart healthy eating Completed 11/17/2022   Responsible User: Heidi Freedman RD      Goal: Monitoring - monitor glucose and ketones as directed       Task: Provide education on continuous glucose monitoring (sensor placement, use of blake or /reader, understanding glucose trends, alerts and alarms, differences between sensor glucose and blood glucose)    Responsible User: Heidi Freedman RD   Note:    Discussed option for CGMS, pt will think about it     Goal: Taking Medication - patient is consistently taking medications as directed    This Visit's Progress: 100%   Recent Progress: 80%   Note:    Patient to take insulin as directed rapid acting insulin before every meal, long-acting insulin at at bedtime     Goal: Reducing Risks - know how to prevent and treat long-term diabetes complications       Task: Provide education on recommended care for dental, eye and foot health Completed 11/17/2022   Responsible User: Heidi Freedman RD      Task: Provide education on recommendations for heart health - lipid levels and goals, blood pressure and goals, and aspirin therapy, if indicated Completed 11/17/2022   Responsible User: Heidi Freedman RD      Goal: Healthy Coping - use available resources to cope with the challenges of managing diabetes       Task: Discuss methods for coping with stress Completed 11/17/2022   Responsible User: Heidi Freedman RD            Time Spent: 30 minutes billable time  Encounter Type: Individual via phone    Any diabetes medication dose changes were made via the CDE Protocol per the patient's referring provider. A copy of this encounter was shared with the provider.

## 2022-12-07 ENCOUNTER — OFFICE VISIT (OUTPATIENT)
Dept: FAMILY MEDICINE | Facility: CLINIC | Age: 62
End: 2022-12-07
Payer: COMMERCIAL

## 2022-12-07 VITALS
RESPIRATION RATE: 16 BRPM | BODY MASS INDEX: 32.78 KG/M2 | SYSTOLIC BLOOD PRESSURE: 148 MMHG | OXYGEN SATURATION: 99 % | HEART RATE: 72 BPM | HEIGHT: 70 IN | DIASTOLIC BLOOD PRESSURE: 82 MMHG | TEMPERATURE: 96.7 F | WEIGHT: 229 LBS

## 2022-12-07 DIAGNOSIS — E11.10 DIABETIC KETOACIDOSIS WITHOUT COMA ASSOCIATED WITH TYPE 2 DIABETES MELLITUS (H): ICD-10-CM

## 2022-12-07 DIAGNOSIS — B00.9 HSV (HERPES SIMPLEX VIRUS) INFECTION: ICD-10-CM

## 2022-12-07 DIAGNOSIS — Z00.00 WELL ADULT EXAM: Primary | ICD-10-CM

## 2022-12-07 PROCEDURE — 99213 OFFICE O/P EST LOW 20 MIN: CPT | Mod: 25 | Performed by: FAMILY MEDICINE

## 2022-12-07 PROCEDURE — 99396 PREV VISIT EST AGE 40-64: CPT | Mod: 25 | Performed by: FAMILY MEDICINE

## 2022-12-07 RX ORDER — LOSARTAN POTASSIUM 25 MG/1
12.5 TABLET ORAL DAILY
Qty: 15 TABLET | Refills: 11 | Status: SHIPPED | OUTPATIENT
Start: 2022-12-07 | End: 2023-02-08

## 2022-12-07 RX ORDER — ACYCLOVIR 400 MG/1
400 TABLET ORAL EVERY 8 HOURS
Qty: 15 TABLET | Refills: 4
Start: 2022-12-07 | End: 2023-01-18

## 2022-12-07 ASSESSMENT — ENCOUNTER SYMPTOMS
NAUSEA: 0
HEADACHES: 0
COUGH: 0
CONSTIPATION: 0
NERVOUS/ANXIOUS: 1
HEMATOCHEZIA: 0
SORE THROAT: 0
HEMATURIA: 0
DIARRHEA: 0
MYALGIAS: 0
EYE PAIN: 0
DIZZINESS: 1
ARTHRALGIAS: 1
ABDOMINAL PAIN: 0
JOINT SWELLING: 0
FEVER: 0
WEAKNESS: 1
SHORTNESS OF BREATH: 0
CHILLS: 0
HEARTBURN: 1
DYSURIA: 0
PARESTHESIAS: 0
PALPITATIONS: 0
FREQUENCY: 0

## 2022-12-07 ASSESSMENT — PAIN SCALES - GENERAL: PAINLEVEL: NO PAIN (0)

## 2022-12-07 NOTE — PROGRESS NOTES
SUBJECTIVE:   CC: Justin is an 62 year old who presents for preventative health visit.   Patient has been advised of split billing requirements and indicates understanding: Yes  Healthy Habits:     Getting at least 3 servings of Calcium per day:  Yes    Bi-annual eye exam:  NO    Dental care twice a year:  NO    Sleep apnea or symptoms of sleep apnea:  None    Diet:  Diabetic and Carbohydrate counting    Frequency of exercise:  6-7 days/week    Duration of exercise:  30-45 minutes    Taking medications regularly:  Yes    Medication side effects:  Other    PHQ-2 Total Score: 0    Additional concerns today:  No              Today's PHQ-2 Score:   PHQ-2 (  [employer]) 2022   Q1: Little interest or pleasure in doing things 0   Q2: Feeling down, depressed or hopeless 0   PHQ-2 Score 0   PHQ-2 Total Score (12-17 Years)- Positive if 3 or more points; Administer PHQ-A if positive -   Q1: Little interest or pleasure in doing things Not at all   Q2: Feeling down, depressed or hopeless Not at all   PHQ-2 Score 0       Have you ever done Advance Care Planning? (For example, a Health Directive, POLST, or a discussion with a medical provider or your loved ones about your wishes): Yes, advance care planning is on file.    Social History     Tobacco Use     Smoking status: Former     Types: Cigarettes     Quit date: 2005     Years since quittin.6     Smokeless tobacco: Former   Substance Use Topics     Alcohol use: Yes     Comment: occas.         Alcohol Use 2022   Prescreen: >3 drinks/day or >7 drinks/week? No   AUDIT SCORE  -       Last PSA:   Prostate Specific Antigen Screen   Date Value Ref Range Status   2022 4.27 0.00 - 4.50 ng/mL Final       Reviewed orders with patient. Reviewed health maintenance and updated orders accordingly - Yes  Labs reviewed in EPIC  Patient Active Problem List   Diagnosis     Senile nuclear sclerosis     Esophageal reflux     Essential hypertension     Abnormal chest CT      Right knee pain     Status post total right knee replacement     GERD (gastroesophageal reflux disease)     Type 2 diabetes mellitus with diabetic polyneuropathy, without long-term current use of insulin (H)     HSV (herpes simplex virus) infection     Erectile dysfunction, unspecified erectile dysfunction type     Morbid obesity (H)     Diabetic ketoacidosis without coma associated with type 2 diabetes mellitus (H)     Hyperglycemia     Ketosis due to diabetes (H)     Increased anion gap metabolic acidosis     Past Surgical History:   Procedure Laterality Date     ARTHROSCOPY KNEE RT/LT Right     meniscectomy     HERNIA REPAIR, UMBILICAL  1980     JOINT REPLACEMTN, KNEE RT/LT Right 2015    Joint Replacement knee RT/LT     PHACOEMULSIFICATION WITH STANDARD INTRAOCULAR LENS IMPLANT  2014    Procedure: PHACOEMULSIFICATION WITH STANDARD INTRAOCULAR LENS IMPLANT;  Surgeon: Issac Lee MD;  Location: [facility]     PHACOEMULSIFICATION WITH STANDARD INTRAOCULAR LENS IMPLANT  7/3/2014    Procedure: PHACOEMULSIFICATION WITH STANDARD INTRAOCULAR LENS IMPLANT;  Surgeon: Issac Lee MD;  Location: [facility] OR       Social History     Tobacco Use     Smoking status: Former     Types: Cigarettes     Quit date: 2005     Years since quittin.6     Smokeless tobacco: Former   Substance Use Topics     Alcohol use: Yes     Comment: occas.     Family History   Problem Relation Age of Onset     Diabetes Mother      Cerebrovascular Disease Mother      Diabetes Father      Cancer Father      Coronary Artery Disease Early Onset Father 55        CABG         Current Outpatient Medications   Medication Sig Dispense Refill     aspirin 81 MG EC tablet Take 81 mg by mouth daily       blood glucose (NO BRAND SPECIFIED) test strip Use to test blood sugar 3 times daily or as directed.  One Touch Verio test strips 300 strip 3     ciclopirox (LOPROX) 0.77 % cream Apply twice daily to hands and feet. 90 g 4     Glucagon  (GVOKE HYPOPEN 2-PACK) 1 MG/0.2ML SOAJ Inject 1 mg Subcutaneous as needed (To be used if patient is unable to safely consume oral rapid acting carbohydrate) 0.4 mL 0     insulin aspart (NOVOLOG FLEXPEN RELION) 100 UNIT/ML pen Inject 10 Units Subcutaneous 3 times daily (with meals) 15 mL 0     Insulin Glargine-yfgn (SEMGLEE, YFGN,) 100 UNIT/ML SOPN Inject 22 Units Subcutaneous At Bedtime 15 mL 0     insulin pen needle (32G X 6 MM) 32G X 6 MM miscellaneous Use insulin pen needles daily or as directed. 110 each 0     MULTIPLE VITAMIN PO Take 1 tablet by mouth daily       OMEPRAZOLE PO Take 20 mg by mouth daily       sildenafil (REVATIO) 20 MG tablet Take 1-3 tablets (20-60 mg) by mouth daily as needed (1 hour prior to sexual activity) 30 tablet 4     terbinafine (LAMISIL) 250 MG tablet Take 1 tablet (250 mg) by mouth daily 14 tablet 0     triamcinolone (KENALOG) 0.1 % external ointment Apply topically 2 times daily 30 g 1     vitamin B-12 (CYANOCOBALAMIN) 100 MCG tablet Take 100 mcg by mouth daily       vitamin D3 (CHOLECALCIFEROL) 50 mcg (2000 units) tablet Take 1 tablet by mouth daily       Allergies   Allergen Reactions     Hmg-Coa-R Inhibitors Unknown     Metoprolol Dizziness       Reviewed and updated as needed this visit by clinical staff    Allergies               Reviewed and updated as needed this visit by Provider                 Past Medical History:   Diagnosis Date     Hypertension       Past Surgical History:   Procedure Laterality Date     ARTHROSCOPY KNEE RT/LT Right 7/14    meniscectomy     HERNIA REPAIR, UMBILICAL  1980     JOINT REPLACEMTN, KNEE RT/LT Right 11/2015    Joint Replacement knee RT/LT     PHACOEMULSIFICATION WITH STANDARD INTRAOCULAR LENS IMPLANT  6/19/2014    Procedure: PHACOEMULSIFICATION WITH STANDARD INTRAOCULAR LENS IMPLANT;  Surgeon: Issac Lee MD;  Location: [facility]     PHACOEMULSIFICATION WITH STANDARD INTRAOCULAR LENS IMPLANT  7/3/2014    Procedure: PHACOEMULSIFICATION  "WITH STANDARD INTRAOCULAR LENS IMPLANT;  Surgeon: Issac Lee MD;  Location: [facility] OR       Review of Systems   Constitutional: Negative for chills and fever.   HENT: Positive for hearing loss. Negative for congestion, ear pain and sore throat.    Eyes: Negative for pain and visual disturbance.   Respiratory: Negative for cough and shortness of breath.    Cardiovascular: Negative for chest pain, palpitations and peripheral edema.   Gastrointestinal: Positive for heartburn. Negative for abdominal pain, constipation, diarrhea, hematochezia and nausea.   Genitourinary: Negative for dysuria, frequency, genital sores, hematuria, impotence, penile discharge and urgency.   Musculoskeletal: Positive for arthralgias. Negative for joint swelling and myalgias.   Skin: Negative for rash.   Neurological: Positive for dizziness and weakness. Negative for headaches and paresthesias.   Psychiatric/Behavioral: Negative for mood changes. The patient is nervous/anxious.          OBJECTIVE:   BP (!) 148/82   Pulse 72   Temp (!) 96.7  F (35.9  C) (Tympanic)   Resp 16   Ht 1.778 m (5' 10\")   Wt 103.9 kg (229 lb)   SpO2 99%   BMI 32.86 kg/m      Physical Exam  GENERAL: healthy, alert and no distress  EYES: Eyes grossly normal to inspection, PERRL and conjunctivae and sclerae normal  HENT: normal cephalic/atraumatic and ear canals and TM's normal  NECK: no adenopathy, no asymmetry, masses, or scars and thyroid normal to palpation  RESP: lungs clear to auscultation - no rales, rhonchi or wheezes  CV: regular rate and rhythm, normal S1 S2, no S3 or S4, no murmur, click or rub, no peripheral edema and peripheral pulses strong  ABDOMEN: soft, nontender, no hepatosplenomegaly, no masses and bowel sounds normal  MS: no gross musculoskeletal defects noted, no edema  SKIN: no suspicious lesions or rashes  NEURO: Normal strength and tone, mentation intact and speech normal  PSYCH: mentation appears normal, affect " normal/bright    Diagnostic Test Results:  Labs reviewed in Epic    Lab Results   Component Value Date    A1C 14.2 11/01/2022    A1C 7.5 04/06/2022    A1C 8.1 07/23/2021        ASSESSMENT/PLAN:   Well adult exam    Diabetic ketoacidosis without coma associated with type 2 diabetes mellitus (H)  Improved control. Working with diabetic ed on insulin adjustment.  Lisinopril caused lightheadedness.  He stopped this.  Will try low dose losartan instead. Re-check blood pressure at home and at next diabetic visit. Further adjustment PRN.  - losartan (COZAAR) 25 MG tablet; Take 0.5 tablets (12.5 mg) by mouth daily    HSV (herpes simplex virus) infection  Stable   - acyclovir (ZOVIRAX) 400 MG tablet; Take 1 tablet (400 mg) by mouth every 8 hours        Patient has been advised of split billing requirements and indicates understanding: Yes      COUNSELING:   Reviewed preventive health counseling, as reflected in patient instructions        He reports that he quit smoking about 17 years ago. His smoking use included cigarettes. He has quit using smokeless tobacco.        Norah Harvey MD  [facility]

## 2022-12-07 NOTE — NURSING NOTE
"Initial BP (!) 148/82   Pulse 72   Temp (!) 96.7  F (35.9  C) (Tympanic)   Resp 16   Ht 1.778 m (5' 10\")   Wt 103.9 kg (229 lb)   SpO2 99%   BMI 32.86 kg/m   Estimated body mass index is 32.86 kg/m  as calculated from the following:    Height as of this encounter: 1.778 m (5' 10\").    Weight as of this encounter: 103.9 kg (229 lb). .      "

## 2022-12-07 NOTE — PATIENT INSTRUCTIONS
"Vinny sensor    * * *    Per federal transparency in medicine laws, lab and imaging results are released \"real time\" into My Chart.  This may mean that you see the results before I have a chance to review them. My Chart will alert you again when I review the lab and enter comments.  Sometimes with imaging or labs there may be serious or unexpected results. Critical results are paged to me or the after hours on-call provider so that they can be reviewed immediately.  This is not true of non-critical abnormal results. Unfortunately, this means that it's possible you may be alerted of a serious finding before I have a change to review it.  If you ever receive a result that you are concerned about and I have not already contacted you, please feel free to reach out to me or the care team so that you get the answers you need.    Additionally, it is my goal that you understand the care plan discussed at your visit and that any questions you have are answered.  Please feel free to reach out if you need clarification or explanation of any information addressed at your office visit.    "

## 2022-12-14 ENCOUNTER — VIRTUAL VISIT (OUTPATIENT)
Dept: EDUCATION SERVICES | Facility: CLINIC | Age: 62
End: 2022-12-14
Payer: COMMERCIAL

## 2022-12-14 DIAGNOSIS — E11.42 TYPE 2 DIABETES MELLITUS WITH DIABETIC POLYNEUROPATHY, WITHOUT LONG-TERM CURRENT USE OF INSULIN (H): Primary | ICD-10-CM

## 2022-12-14 PROCEDURE — G0108 DIAB MANAGE TRN  PER INDIV: HCPCS | Mod: 95 | Performed by: DIETITIAN, REGISTERED

## 2022-12-14 NOTE — PATIENT INSTRUCTIONS
Lantus - 26 units and continue to adjust by 1 unit every evening to maintain your  morning blood glucose is between 80 and 130 mg/dL (if it's too high for 3 days in a row - increase your Lantus)    NovoLog -10 units before all meals plus additional insulin if glucose is high  If you are consuming a smaller meal or lower carbohydrate than usual intake decrease  at least by 2 units  150 - 180 +1  181 - 210 +2  211 - 240 +3  241 - 270 +4  271 - 300 +5    Blood Glucose testing - Test before all meals  Before eating goal 80 - 130mg/dL  2 hours after goal 80 - 150mg/dL (<180 at any point)    Freestyle Vinny 2 ordered

## 2022-12-14 NOTE — LETTER
12/14/2022         RE: Brent Stewart  [address]  [facility] [address]        Dear Colleague,    Thank you for referring your patient, Brent Stewart, to the [facility]. Please see a copy of my visit note below.    Diabetes Self-Management Education & Support    Presents for: Individual review Type II diabetes      Type of Service: Telephone Visit     Originating Location (Patient Location): Home  Distant Location (Provider Location): [facility]  Mode of Communication:  Telephone     Telephone Visit Start Time: 10:30  Telephone Visit End Time (telephone visit stop time): 11:05     How would patient like to obtain AVS? Mail a copy  Assessment Type:   ASSESSMENT:  11/10/2022  Patient with known history of type 2 diabetes for approximately 8 years.  Patient was seen in the clinic on 11/10/2022  11/2/2022 by provider with complaints of 40 pound weight loss in 2 months.  Labs showing moderate DKA and was sent to the hospital     Admitted to the hospital on 11/2/2022 and discharged on 11/4/2022.  Patient discharged on Lantus 22 units at bedtime and NovoLog 10 units 3 times daily with meals.       Previous to hospitalization   A1c 7/23/2021 8.1%  A1c 4/6/2022 7.5%  At 1 point in 2021  It looks like patient had been on glipizide 5 mg daily 8/4/2021 - 9/20/2021 and was going to start on Trulicity 9/20/2021 by reviewing the chart it does not look like patient started that therapy and did not go back to taking glipizide.   Metformin -patient has a history of intolerance.     CT result- normal pancreas  A1c 14.2% equals estimated average glucose 361 mg/dL    11/17/2022  Follow up education/ evaluation of glucose readings/ insulin management.      Lantus -patient only increased from 22 units to 23 units and did not increase as directed ongoing until a.m. readings between 80 and 130.  Today reeducation on Lantus titration recommendations   a.m. glucose  readings continue to be elevated 205, 225, 235 mg/dL     NovoLog patient is taking as directed and is using the correction scale appropriately.  NovoLog -10 units before all meals plus correction factor  150 - 180 +1  181 - 210 +2  211 - 240 +3  241 - 270 +4  271 - 300 +5              Patient did likely have an episode of hypoglycemia recently with symptoms of feeling shaky.  Patient did treat with juice and felt better.  When reviewing intake and insulin dosing patient had consumed a lower carbohydrate breakfast than usual.  I discussed options for varying NovoLog dosing based on intake, will not change to insulin to carb ratio yet but will have patient decrease dose by 2 units when intake is going to be a smaller amount of carbohydrate.    Education on severe hypoglycemia we will order Gvoke (glucagon injection).  Patient will print off discount card at home from website.    Blood glucose testing 3 times daily AC    12/14/2022 patient unable to provide glucose readings today because patient notes he accidentally deleted the memory off of the meter.  Patient is interested in using a freestyle vinny 2 system - ordered today.    Patient does note that he had been on Ozempic and discontinued due to feeling sick.  Patient will be meeting with Endo 1/2023.    No reported episodes of hypoglycemia.    Current insulin dosing   Lantus 26u (pt did increase as directed)    NovoLog patient is taking as directed and is using the correction scale appropriately.  NovoLog -10 units before all meals plus correction factor  If lower carb meal decrease dose to 6-8 units.  Pt is not quite interested in I:C ratio yet.  Vinny 2 will be very helpful for pt.    150 - 180 +1  181 - 210 +2  211 - 240 +3  241 - 270 +4  271 - 300 +5           Patient's most recent   Lab Results   Component Value Date    A1C 14.2 11/01/2022     is not meeting goal of <7.0    Diabetes knowledge and skills assessment:   Patient is knowledgeable in diabetes  "management concepts related to: Healthy Eating, Being Active, Monitoring, Taking Medication, Problem Solving, Reducing Risks and Healthy Coping    Continue education with the following diabetes management concepts: Healthy Eating, Being Active, Monitoring, Taking Medication, Problem Solving, Reducing Risks and Healthy Coping - review in all areas as needed     Based on learning assessment above, most appropriate setting for further diabetes education would be: Individual setting.      PLAN    Lantus - 26 units and continue to adjust to maintain morning blood glucose is between 80 and 130 mg/dL     NovoLog -10 units before all meals plus correction factor (can decrease to 6 - 8 units if lower carbohydrate meal)  150 - 180 +1  181 - 210 +2  211 - 240 +3  241 - 270 +4  271 - 300 +5              Blood glucose testing 3 times daily AC or Vinny 2    Topics to cover at upcoming visits: review and ongoing education as needed, vinny download     Follow-up: 2 month     See Care Plan for co-developed, patient-state behavior change goals.  AVS provided for patient today.    Education Materials Provided: [employer] system       SUBJECTIVE/OBJECTIVE:     Cultural Influences/Ethnic Background:  Not  or       Diabetes Symptoms & Complications:          Patient Problem List and Family Medical History reviewed for relevant medical history, current medical status, and diabetes risk factors.    Vitals:  There were no vitals taken for this visit.  Estimated body mass index is 32.86 kg/m  as calculated from the following:    Height as of 12/7/22: 1.778 m (5' 10\").    Weight as of 12/7/22: 103.9 kg (229 lb).   Last 3 BP:   BP Readings from Last 3 Encounters:   12/07/22 (!) 148/82   11/04/22 (!) 142/79   11/02/22 128/84       History   Smoking Status     Former     Types: Cigarettes     Quit date: 4/1/2005   Smokeless Tobacco     Former       Labs:  Lab Results   Component Value Date    A1C 14.2 11/01/2022     Lab Results "   Component Value Date     11/04/2022     04/06/2022     Lab Results   Component Value Date    LDL  11/01/2022      Comment:      Cannot estimate LDL when triglyceride exceeds 400 mg/dL     11/01/2022     04/06/2022     Direct Measure HDL   Date Value Ref Range Status   11/01/2022 35 (L) >=40 mg/dL Final   ]  GFR Estimate   Date Value Ref Range Status   11/04/2022 >90 >60 mL/min/1.73m2 Final     Comment:     Effective December 21, 2021 eGFRcr in adults is calculated using the 2021 CKD-EPI creatinine equation which includes age and gender (Emile et al., NEJ, DOI: 10.1056/CBHNif1277117)     GFR, ESTIMATED POCT   Date Value Ref Range Status   11/01/2022 >60 >60 mL/min/1.73m2 Final     No results found for: GFRESTBLACK  Lab Results   Component Value Date    CR 0.61 11/04/2022     No results found for: MICROALBUMIN    Healthy Eating:  Healthy Eating Assessed Today: Yes  Cultural/Episcopalian diet restrictions?: No  Meal planning/habits: Avoiding sweets, Calorie counting, Carb counting, Low salt  How many times a week on average do you eat food made away from home (restaurant/take-out)?: 1  Meals include: Dinner, Afternoon Snack, Evening Snack  Breakfast: egg vegetable omlet  Lunch: sandwich  Dinner: meat, potato, and if he remembers some vegetables  Snacks: likes apples,  Other: has been doing keto diet  Beverages: Water, Coffee  Has patient met with a dietitian in the past?: Yes    Being Active:  Being Active Assessed Today: Yes  Exercise:: Yes  Days per week of moderate to strenuous exercise (like a brisk walk): 4  On average, minutes per day of exercise at this level: 20  How intense was your typical exercise? : Light (like stretching or slow walking)  Exercise Minutes per Week: 80  Barrier to exercise: None    Monitoring:  Monitoring Assessed Today: Yes  Did patient bring glucose meter to appointment? : No  Blood Glucose Meter: One Touch, FreeStyle  Times checking blood sugar at home  (number): 3  Times checking blood sugar at home (per): Day  Blood glucose trend: Decreasing    AM readings 205 - 235mg/dL, evening readings 124 - 173 mg/dL    Taking Medications:  Diabetes Medication(s)     Diabetic Other       Glucagon (GVOKE HYPOPEN 2-PACK) 1 MG/0.2ML SOAJ    Inject 1 mg Subcutaneous as needed (To be used if patient is unable to safely consume oral rapid acting carbohydrate)    Insulin       insulin aspart (NOVOLOG FLEXPEN RELION) 100 UNIT/ML pen    Inject 10 Units Subcutaneous 3 times daily (with meals)     Insulin Glargine-yfgn (SEMGLEE, YFGN,) 100 UNIT/ML SOPN    Inject 22 Units Subcutaneous At Bedtime      Lantus -increase to 24 units and continue to increase by 1 unit every evening until your morning blood glucose is between 80 and 130 mg/dL     NovoLog -10 units before all meals plus correction factor (patient to decrease to 8 units if consuming lower carbohydrate or smaller portion meal)  150 - 180 +1  181 - 210 +2  211 - 240 +3  241 - 270 +4  271 - 300 +5              Blood glucose testing 3 times daily AC    Taking Medication Assessed Today: Yes  Current Treatments: Insulin Injections  Dose schedule: Pre-breakfast, Pre-lunch, Pre-dinner, At bedtime  Given by: Patient  Injection/Infusion sites: Abdomen  Problems taking diabetes medications regularly?: No  Diabetes medication side effects?: No    Problem Solving:  Problem Solving Assessed Today: Yes  Is the patient at risk for hypoglycemia?: Yes  Hypoglycemia Frequency: Never  Hypoglycemia Treatment: Juice, Glucose (tablets or gel)  Does patient have glucagon emergency kit?:  (ordering today)    Hypoglycemia symptoms  Confusion: No  Dizziness or Light-Headedness: No  Headaches: No  Hunger: No  Mood changes: No  Nervousness/Anxiety: No  Sleepiness: No  Speech difficulty: No  Sweats: No  Feeling shaky: Yes    Hypoglycemia Complications  Blackouts: No  Hospitalization: No  Nocturnal hypoglycemia: No  Required assistance: No  Required glucagon  injection: No  Seizures: No    Reducing Risks:  Diabetes Risks: Age over 45 years, Family History, Hypertriglyceridemia  CAD Risks: Diabetes Mellitus, Male sex, Obesity, Family history    Healthy Coping:  Healthy Coping Assessed Today: Yes  Emotional response to diabetes: Acceptance  Informal Support system:: Family  Stage of change: ACTION (Actively working towards change)  Support resources: Websites  Patient Activation Measure Survey Score:  No flowsheet data found.      Care Plan and Education Provided:  There are no care plans that you recently modified to display for this patient.      Time Spent: 30 minutes billable time  Encounter Type: Individual via phone    Any diabetes medication dose changes were made via the CDE Protocol per the patient's referring provider. A copy of this encounter was shared with the provider.

## 2023-01-17 DIAGNOSIS — E11.42 TYPE 2 DIABETES MELLITUS WITH DIABETIC POLYNEUROPATHY, WITHOUT LONG-TERM CURRENT USE OF INSULIN (H): ICD-10-CM

## 2023-01-17 NOTE — TELEPHONE ENCOUNTER
1/18/2023  Adjusting pended insulin orders to follow plan as noted below.          Pt calling to refill insulin. Pt has a little over a week left.    .Rabia Saenz PSC

## 2023-01-18 ENCOUNTER — OFFICE VISIT (OUTPATIENT)
Dept: ENDOCRINOLOGY | Facility: CLINIC | Age: 63
End: 2023-01-18
Attending: INTERNAL MEDICINE
Payer: COMMERCIAL

## 2023-01-18 VITALS
HEART RATE: 94 BPM | DIASTOLIC BLOOD PRESSURE: 86 MMHG | BODY MASS INDEX: 33 KG/M2 | OXYGEN SATURATION: 99 % | WEIGHT: 230 LBS | SYSTOLIC BLOOD PRESSURE: 147 MMHG

## 2023-01-18 DIAGNOSIS — E11.10 DIABETIC KETOACIDOSIS WITHOUT COMA ASSOCIATED WITH TYPE 2 DIABETES MELLITUS (H): Primary | ICD-10-CM

## 2023-01-18 DIAGNOSIS — E11.42 TYPE 2 DIABETES MELLITUS WITH DIABETIC POLYNEUROPATHY, WITHOUT LONG-TERM CURRENT USE OF INSULIN (H): ICD-10-CM

## 2023-01-18 PROCEDURE — 84681 ASSAY OF C-PEPTIDE: CPT | Performed by: INTERNAL MEDICINE

## 2023-01-18 PROCEDURE — 99204 OFFICE O/P NEW MOD 45 MIN: CPT | Performed by: INTERNAL MEDICINE

## 2023-01-18 PROCEDURE — 99000 SPECIMEN HANDLING OFFICE-LAB: CPT | Performed by: INTERNAL MEDICINE

## 2023-01-18 PROCEDURE — 36415 COLL VENOUS BLD VENIPUNCTURE: CPT | Performed by: INTERNAL MEDICINE

## 2023-01-18 PROCEDURE — 86341 ISLET CELL ANTIBODY: CPT | Mod: 90 | Performed by: INTERNAL MEDICINE

## 2023-01-18 RX ORDER — INSULIN ASPART 100 [IU]/ML
INJECTION, SOLUTION INTRAVENOUS; SUBCUTANEOUS
Qty: 45 ML | Refills: 1 | Status: SHIPPED | OUTPATIENT
Start: 2023-01-18 | End: 2023-01-18

## 2023-01-18 RX ORDER — INSULIN DEGLUDEC 100 U/ML
INJECTION, SOLUTION SUBCUTANEOUS
Qty: 30 ML | Refills: 3 | Status: SHIPPED | OUTPATIENT
Start: 2023-01-18 | End: 2023-02-10

## 2023-01-18 RX ORDER — INSULIN ASPART 100 [IU]/ML
INJECTION, SOLUTION INTRAVENOUS; SUBCUTANEOUS
Qty: 45 ML | Refills: 3 | Status: SHIPPED | OUTPATIENT
Start: 2023-01-18 | End: 2023-02-10

## 2023-01-18 RX ORDER — INSULIN GLARGINE-YFGN 100 [IU]/ML
26 INJECTION, SOLUTION SUBCUTANEOUS AT BEDTIME
Qty: 30 ML | Refills: 1 | Status: SHIPPED | OUTPATIENT
Start: 2023-01-18 | End: 2023-01-18 | Stop reason: ALTCHOICE

## 2023-01-18 NOTE — NURSING NOTE
Chief Complaint   Patient presents with     Diabetes     Type 2 DM referred by Dr Boss after labs came back elevated for glucose,per patient over 900. Here to discus with Doctor Maru SMITH for elevated glucose Fatigue/dizzy/loosing 40 lbs in less than 4 weeks.       There were no vitals filed for this visit.  Wt Readings from Last 1 Encounters:   12/07/22 103.9 kg (229 lb)   Sylvia Madden MA

## 2023-01-18 NOTE — LETTER
1/18/2023         RE: Brent Stewart  [address]  [facility] [address]        Dear Colleague,    Thank you for referring your patient, Brent Stewart, to the [facility] ENDOCRINOLOGY. Please see a copy of my visit note below.    2Endocrine Consult note    Attending Assessment/Plan :        Type 2 diabetes mellitus with diabetic polyneuropathy, without long-term current use of insulin (H)  Diabetic ketoacidosis without coma associated with type 2 diabetes mellitus (H)  Assessment:  -sudden spike in a1c plus DKA raises suspicion for RIAN/Type 1 DM as diagnosis  -otherwise appears to be under good control with current insulin regimen    Plan:  -screen for type 1  -switch lantus to degludec at current dose to see if this stops burning sensation he's experiencing with lantus  -start self-titration plan for degludec a few days after switching over - titrate by 2 units q3 days to keep -130 without hypos  -continue novolog  -discussed getting a1c under control with insulin first then we can consider alternative t2dm regimen if labs confirm t2dm and blood sugars allow    I have independently reviewed and interpreted labs, imaging as indicated.    RTC 3 months    Chief complaint:  Brent is a 62 year old male seen in consultation for post hospitalization diabetes management.       HISTORY OF PRESENT ILLNESS    Brent was diagnosed with t2dm ~2015.      Recently he was evaluated in November for 40lb unintenitional WL in the last 2 months.  He was in moderate DKA and was hospitalized for management.      Prior to hospitalization regimen was:  Glipizide 5 daily  Trulicity    Post discharge/current regimen:  -Lantus 22 (plus self titration plan goal )  -Novolog 10 + 1:30 goal 150 - takes this if BG is in the low 100's      Having some symptomatic lows     Morning low 100s.     He does report having a burning sensation with lantus.  Not happening with Novolog.     Daytime  Post breakfast BG  drop to <100 but not frequent lows  Pre-lunch - mid 100's  Pre-dinner - mid to low 100s      Endocrine relevant labs and/or imaging are as follows:  Component      Latest Ref Rng & Units 4/6/2022 11/1/2022   Hemoglobin A1C      0.0 - 5.6 % 7.5 (H) 14.2 (H)     REVIEW OF SYSTEMS    10 system ROS otherwise as per the HPI or negative    Past Medical History  Past Medical History:   Diagnosis Date     Hypertension        Medications  Current Outpatient Medications   Medication Sig Dispense Refill     acyclovir (ZOVIRAX) 400 MG tablet Take 1 tablet (400 mg) by mouth every 8 hours 15 tablet 4     aspirin 81 MG EC tablet Take 81 mg by mouth daily       blood glucose (NO BRAND SPECIFIED) test strip Use to test blood sugar 3 times daily or as directed.  One Touch Verio test strips 300 strip 3     ciclopirox (LOPROX) 0.77 % cream Apply twice daily to hands and feet. 90 g 4     Continuous Blood Gluc  (FREESTYLE RANDALL 2 READER) WINDY 1 each continuous 1 each 0     Continuous Blood Gluc Sensor (FREESTYLE RANDALL 2 SENSOR) MISC 1 each every 14 days 6 each 3     Glucagon (GVOKE HYPOPEN 2-PACK) 1 MG/0.2ML SOAJ Inject 1 mg Subcutaneous as needed (To be used if patient is unable to safely consume oral rapid acting carbohydrate) 0.4 mL 0     insulin aspart (NOVOLOG FLEXPEN RELION) 100 UNIT/ML pen 10 units before all meals plus correction 150 - 180 +1u; 181 - 210 +2u; 211 - 240 + 3u; 241 - 270 +4u; 271 - 300 +5u.  May lower base dose from 10 to 6-8units if lower carbohydrate meal. 45 mL 1     Insulin Glargine-yfgn (SEMGLEE, YFGN,) 100 UNIT/ML SOPN Inject 26 Units Subcutaneous At Bedtime Adjust by 1unit as needed to maintain am glucose 80 - 130 30 mL 1     insulin pen needle (32G X 6 MM) 32G X 6 MM miscellaneous Use insulin pen needles daily or as directed. 110 each 0     losartan (COZAAR) 25 MG tablet Take 0.5 tablets (12.5 mg) by mouth daily 15 tablet 11     MULTIPLE VITAMIN PO Take 1 tablet by mouth daily       OMEPRAZOLE PO  Take 20 mg by mouth daily       sildenafil (REVATIO) 20 MG tablet Take 1-3 tablets (20-60 mg) by mouth daily as needed (1 hour prior to sexual activity) 30 tablet 4     terbinafine (LAMISIL) 250 MG tablet Take 1 tablet (250 mg) by mouth daily 14 tablet 0     triamcinolone (KENALOG) 0.1 % external ointment Apply topically 2 times daily 30 g 1     vitamin B-12 (CYANOCOBALAMIN) 100 MCG tablet Take 100 mcg by mouth daily       vitamin D3 (CHOLECALCIFEROL) 50 mcg (2000 units) tablet Take 1 tablet by mouth daily         Allergies  Allergies   Allergen Reactions     Hmg-Coa-R Inhibitors Unknown     Metoprolol Dizziness         Family History  family history includes Cancer in his father; Cerebrovascular Disease in his mother; Coronary Artery Disease Early Onset (age of onset: 55) in his father; Diabetes in his father and mother.    Social History  Social History     Tobacco Use     Smoking status: Former     Types: Cigarettes     Quit date: 2005     Years since quittin.8     Smokeless tobacco: Former   Substance Use Topics     Alcohol use: Yes     Comment: occas.     Drug use: No       Physical Exam  BP (!) 147/86 (BP Location: Right arm, Patient Position: Sitting, Cuff Size: Adult Regular)   Pulse 94   Wt 104.3 kg (230 lb)   SpO2 99%   BMI 33.00 kg/m    Body mass index is 33 kg/m .    Physical Exam    GENERAL :  In no apparent distress  SKIN: Normal color, normal temperature     EYES: EOMI, No scleral icterus  NECK: No visible masses  RESP: No respiratory distress, normal effort  CARDIO: regular rate  ABDOMEN: flat, nondistended       NEURO: awake, alert, responds appropriately to questions   EXTREMITIES: No clubbing, cyanosis or edema.    I spent a total of 46 minutes on the day of this new patient visit on chart review, lab review, coordinating care, exam and counseling of patient      Again, thank you for allowing me to participate in the care of your patient.        Sincerely,        Manas Mahoney MD

## 2023-01-18 NOTE — PROGRESS NOTES
2Endocrine Consult note    Attending Assessment/Plan :        Type 2 diabetes mellitus with diabetic polyneuropathy, without long-term current use of insulin (H)  Diabetic ketoacidosis without coma associated with type 2 diabetes mellitus (H)  Assessment:  -sudden spike in a1c plus DKA raises suspicion for RIAN/Type 1 DM as diagnosis  -otherwise appears to be under good control with current insulin regimen    Plan:  -screen for type 1  -switch lantus to degludec at current dose to see if this stops burning sensation he's experiencing with lantus  -start self-titration plan for degludec a few days after switching over - titrate by 2 units q3 days to keep -130 without hypos  -continue novolog  -discussed getting a1c under control with insulin first then we can consider alternative t2dm regimen if labs confirm t2dm and blood sugars allow    I have independently reviewed and interpreted labs, imaging as indicated.    RTC 3 months    Chief complaint:  Brent is a 62 year old male seen in consultation for post hospitalization diabetes management.       HISTORY OF PRESENT ILLNESS    Brent was diagnosed with t2dm ~2015.      Recently he was evaluated in November for 40lb unintenitional WL in the last 2 months.  He was in moderate DKA and was hospitalized for management.      Prior to hospitalization regimen was:  Glipizide 5 daily  Trulicity    Post discharge/current regimen:  -Lantus 22 (plus self titration plan goal )  -Novolog 10 + 1:30 goal 150 - takes this if BG is in the low 100's      Having some symptomatic lows     Morning low 100s.     He does report having a burning sensation with lantus.  Not happening with Novolog.     Daytime  Post breakfast BG drop to <100 but not frequent lows  Pre-lunch - mid 100's  Pre-dinner - mid to low 100s      Endocrine relevant labs and/or imaging are as follows:  Component      Latest Ref Rng & Units 4/6/2022 11/1/2022   Hemoglobin A1C      0.0 - 5.6 % 7.5 (H) 14.2 (H)      REVIEW OF SYSTEMS    10 system ROS otherwise as per the HPI or negative    Past Medical History  Past Medical History:   Diagnosis Date     Hypertension        Medications  Current Outpatient Medications   Medication Sig Dispense Refill     acyclovir (ZOVIRAX) 400 MG tablet Take 1 tablet (400 mg) by mouth every 8 hours 15 tablet 4     aspirin 81 MG EC tablet Take 81 mg by mouth daily       blood glucose (NO BRAND SPECIFIED) test strip Use to test blood sugar 3 times daily or as directed.  One Touch Verio test strips 300 strip 3     ciclopirox (LOPROX) 0.77 % cream Apply twice daily to hands and feet. 90 g 4     Continuous Blood Gluc  (FREESTYLE RANDALL 2 READER) WINDY 1 each continuous 1 each 0     Continuous Blood Gluc Sensor (FREESTYLE RANDALL 2 SENSOR) MISC 1 each every 14 days 6 each 3     Glucagon (GVOKE HYPOPEN 2-PACK) 1 MG/0.2ML SOAJ Inject 1 mg Subcutaneous as needed (To be used if patient is unable to safely consume oral rapid acting carbohydrate) 0.4 mL 0     insulin aspart (NOVOLOG FLEXPEN RELION) 100 UNIT/ML pen 10 units before all meals plus correction 150 - 180 +1u; 181 - 210 +2u; 211 - 240 + 3u; 241 - 270 +4u; 271 - 300 +5u.  May lower base dose from 10 to 6-8units if lower carbohydrate meal. 45 mL 1     Insulin Glargine-yfgn (SEMGLEE, YFGN,) 100 UNIT/ML SOPN Inject 26 Units Subcutaneous At Bedtime Adjust by 1unit as needed to maintain am glucose 80 - 130 30 mL 1     insulin pen needle (32G X 6 MM) 32G X 6 MM miscellaneous Use insulin pen needles daily or as directed. 110 each 0     losartan (COZAAR) 25 MG tablet Take 0.5 tablets (12.5 mg) by mouth daily 15 tablet 11     MULTIPLE VITAMIN PO Take 1 tablet by mouth daily       OMEPRAZOLE PO Take 20 mg by mouth daily       sildenafil (REVATIO) 20 MG tablet Take 1-3 tablets (20-60 mg) by mouth daily as needed (1 hour prior to sexual activity) 30 tablet 4     terbinafine (LAMISIL) 250 MG tablet Take 1 tablet (250 mg) by mouth daily 14 tablet 0      triamcinolone (KENALOG) 0.1 % external ointment Apply topically 2 times daily 30 g 1     vitamin B-12 (CYANOCOBALAMIN) 100 MCG tablet Take 100 mcg by mouth daily       vitamin D3 (CHOLECALCIFEROL) 50 mcg (2000 units) tablet Take 1 tablet by mouth daily         Allergies  Allergies   Allergen Reactions     Hmg-Coa-R Inhibitors Unknown     Metoprolol Dizziness         Family History  family history includes Cancer in his father; Cerebrovascular Disease in his mother; Coronary Artery Disease Early Onset (age of onset: 55) in his father; Diabetes in his father and mother.    Social History  Social History     Tobacco Use     Smoking status: Former     Types: Cigarettes     Quit date: 2005     Years since quittin.8     Smokeless tobacco: Former   Substance Use Topics     Alcohol use: Yes     Comment: occas.     Drug use: No       Physical Exam  BP (!) 147/86 (BP Location: Right arm, Patient Position: Sitting, Cuff Size: Adult Regular)   Pulse 94   Wt 104.3 kg (230 lb)   SpO2 99%   BMI 33.00 kg/m    Body mass index is 33 kg/m .    Physical Exam    GENERAL :  In no apparent distress  SKIN: Normal color, normal temperature     EYES: EOMI, No scleral icterus  NECK: No visible masses  RESP: No respiratory distress, normal effort  CARDIO: regular rate  ABDOMEN: flat, nondistended       NEURO: awake, alert, responds appropriately to questions   EXTREMITIES: No clubbing, cyanosis or edema.    I spent a total of 46 minutes on the day of this new patient visit on chart review, lab review, coordinating care, exam and counseling of patient

## 2023-01-18 NOTE — PATIENT INSTRUCTIONS
Switch Lantus to Tresiba (degludec) at same dose.      How to self-adjust long-acting insulin    Check morning fasting blood sugar first thing in the morning daily  Adjust degludec dose every 3 days based on morning blood sugar:  If average is over 130, increase insulin dose by 2 units going forward  If average is 100-130, continue current dose  If average is less than 100 with no lows, decrease dose by 2 units  If you have ANY overnight blood sugars below 70, decrease dose by 2 units immediately (do not wait a few days to change in this case)

## 2023-01-19 LAB — C PEPTIDE SERPL-MCNC: 2.2 NG/ML (ref 0.9–6.9)

## 2023-01-21 LAB
GAD65 AB SER IA-ACNC: <5 IU/ML
PANC ISLET CELL AB TITR SER: NORMAL {TITER}

## 2023-01-22 LAB — ISLET CELL512 AB SER IA-ACNC: <5.4 U/ML

## 2023-01-24 ENCOUNTER — TELEPHONE (OUTPATIENT)
Dept: FAMILY MEDICINE | Facility: CLINIC | Age: 63
End: 2023-01-24
Payer: COMMERCIAL

## 2023-01-24 LAB — ZNT8 AB SERPL IA-ACNC: <10 U/ML

## 2023-01-24 NOTE — TELEPHONE ENCOUNTER
Dr Harvey,  Pt is requesting a different BP medicine, other than losartan.  He is sure that losartan is giving him headaches and migraines.  His BP lately is 147/86.  Pharmacy is [facility] in [place], [place].

## 2023-01-27 NOTE — PROGRESS NOTES
Diabetes Self-Management Education & Support    Presents for: Individual review Type II diabetes     Type of Service: Telephone Visit    Originating Location (Patient Location): Home  Distant Location (Provider Location): [facility]  Mode of Communication:  Telephone    Telephone Visit Start Time: 8:55  Telephone Visit End Time (telephone visit stop time): 9:30    How would patient like to obtain AVS? Mail a copy    Assessment Type:   ASSESSMENT:  Patient with known history of type 2 diabetes for approximately 8 years.  Patient was seen in the clinic on 11/2/2022 by provider with complaints of 40 pound weight loss in 2 months.  Labs showing moderate DKA.  and was sent to the hospital    Admitted to the hospital on 11/2/2022 and discharged on 11/4/2022.  Patient discharged on Lantus 22 units at bedtime and NovoLog 10 units 3 times daily with meals.      Previous to hospitalization   A1c 7/23/2021 8.1%  A1c 4/6/2022 7.5%  At 1 point in 2021  It looks like patient had been on glipizide 5 mg daily 8/4/2021 - 9/20/2021 and was going to start on Trulicity 9/20/2021 by reviewing the chart it does not look like patient started that therapy and did not go back to taking glipizide.   Metformin -patient has a history of intolerance.    CT result- normal pancreas  A1c 14.2% equals estimated average glucose 361 mg/dL    Patient's most recent   Lab Results   Component Value Date    A1C 14.2 11/01/2022     is not meeting goal of <7.0    Diabetes knowledge and skills assessment:   Patient is knowledgeable in diabetes management concepts related to: Education started in the hospital with vague recall    Continue education with the following diabetes management concepts: Healthy Eating, Monitoring, Taking Medication and Problem Solving    Based on learning assessment above, most appropriate setting for further diabetes education would be: Individual setting.      PLAN  Lantus -increase to 22 units and  Head, normocephalic, atraumatic, Face, Face within normal limits, Ears, External ears within normal limits "continue to increase by 1 unit every evening until your morning blood glucose is between 80 and 130 mg/dL    NovoLog -10 units before all meals plus correction factor  150 - 180 +1  181 - 210 +2  211 - 240 +3  241 - 270 +4  271 - 300 +5             Blood glucose testing 3 times daily AC    Topics to cover at upcoming visits: Healthy Eating, Being Active, Monitoring, Taking Medication, Problem Solving, Reducing Risks and Healthy Coping reviewed with additional education    Follow-up: 1 week via telephone    See Care Plan for co-developed, patient-state behavior change goals.  AVS provided for patient today.    Education Materials Provided:  Carbohydrate Counting and My Plate Planner      SUBJECTIVE/OBJECTIVE:  Presents for: Individual review  Diabetes education in the past 24mo: No (in [place] in [place])  Diabetes type: Type 2  Disease course: Worsening  Transportation concerns: No  Cultural Influences/Ethnic Background:  Not  or     Diabetes Symptoms & Complications:  Fatigue: Sometimes  Neuropathy: No  Polydipsia: No  Polyphagia: Sometimes  Polyuria: No  Visual change: No  Slow healing wounds: No  Symptom course: Worsening  Complications assessed today?: No    Patient Problem List and Family Medical History reviewed for relevant medical history, current medical status, and diabetes risk factors.    Vitals:  There were no vitals taken for this visit.  Estimated body mass index is 30.46 kg/m  as calculated from the following:    Height as of 11/2/22: 1.778 m (5' 10\").    Weight as of 11/3/22: 96.3 kg (212 lb 4.9 oz).   Last 3 BP:   BP Readings from Last 3 Encounters:   11/04/22 (!) 142/79   11/02/22 128/84   10/05/22 (!) 158/88       History   Smoking Status     Former     Types: Cigarettes     Quit date: 4/1/2005   Smokeless Tobacco     Former       Labs:  Lab Results   Component Value Date    A1C 14.2 11/01/2022     Lab Results   Component Value Date     11/04/2022     04/06/2022     Lab " Results   Component Value Date    LDL  11/01/2022      Comment:      Cannot estimate LDL when triglyceride exceeds 400 mg/dL     11/01/2022     04/06/2022     Direct Measure HDL   Date Value Ref Range Status   11/01/2022 35 (L) >=40 mg/dL Final   ]  GFR Estimate   Date Value Ref Range Status   11/04/2022 >90 >60 mL/min/1.73m2 Final     Comment:     Effective December 21, 2021 eGFRcr in adults is calculated using the 2021 CKD-EPI creatinine equation which includes age and gender (Emile et al., NEJ, DOI: 10.1056/LJLNkh2632428)     GFR, ESTIMATED POCT   Date Value Ref Range Status   11/01/2022 >60 >60 mL/min/1.73m2 Final     No results found for: GFRESTBLACK  Lab Results   Component Value Date    CR 0.61 11/04/2022     No results found for: MICROALBUMIN    Healthy Eating:  Healthy Eating Assessed Today: Yes  Cultural/Latter-day diet restrictions?: No  Meal planning/habits: Avoiding sweets, Calorie counting, Carb counting, Low salt  How many times a week on average do you eat food made away from home (restaurant/take-out)?: 1  Meals include: Dinner, Afternoon Snack, Evening Snack  Breakfast: egg vegetable omlet  Lunch: sandwich  Dinner: meat, potato, and if he remembers some vegetables  Snacks: likes apples,  Beverages: Water, Coffee  Has patient met with a dietitian in the past?: Yes    Being Active:  Being Active Assessed Today: Yes  Exercise:: Yes  Days per week of moderate to strenuous exercise (like a brisk walk): 4  On average, minutes per day of exercise at this level: 20  How intense was your typical exercise? : Light (like stretching or slow walking)  Exercise Minutes per Week: 80  Barrier to exercise: None    Monitoring:  Monitoring Assessed Today: Yes  Did patient bring glucose meter to appointment? : No  Blood Glucose Meter: One Touch  Times checking blood sugar at home (number): 1  Times checking blood sugar at home (per): Day    A.m. glucose readings  363  252  232  237  323 this  a.m.    Taking Medications:   Patient has been only taking 20 units of Lantus  Diabetes Medication(s)     Insulin       insulin aspart (NOVOLOG FLEXPEN RELION) 100 UNIT/ML pen    Inject 10 Units Subcutaneous 3 times daily (with meals)     Insulin Glargine-yfgn (SEMGLEE, YFGN,) 100 UNIT/ML SOPN    Inject 22 Units Subcutaneous At Bedtime          Taking Medication Assessed Today: Yes  Current Treatments: Insulin Injections  Dose schedule: Pre-breakfast, Pre-lunch, Pre-dinner, At bedtime  Given by: Patient  Injection/Infusion sites: Abdomen  Problems taking diabetes medications regularly?: No  Diabetes medication side effects?: No    Problem Solving:  Problem Solving Assessed Today: Yes  Is the patient at risk for hypoglycemia?: Yes  Hypoglycemia Frequency: Never  Hypoglycemia Treatment: Juice, Glucose (tablets or gel)    Reducing Risks:  Diabetes Risks: Age over 45 years, Family History, Hypertriglyceridemia  CAD Risks: Diabetes Mellitus, Male sex, Obesity, Family history    Healthy Coping:  Healthy Coping Assessed Today: Yes  Emotional response to diabetes: Acceptance  Informal Support system:: Family  Stage of change: ACTION (Actively working towards change)  Patient Activation Measure Survey Score:  No flowsheet data found.      Care Plan and Education Provided:  Care Plan: Diabetes   Updates made by Heidi Freedman RD since 11/10/2022 12:00 AM      Problem: HbA1C Not In Goal       Goal: Establish Regular Follow-Ups with PCP       Task: Discuss with PCP the recommended timing for patient's next follow up visit(s)    Responsible User: Heidi Freedman RD      Task: Discuss schedule for PCP visits with patient    Responsible User: Heidi Freedman RD      Goal: Get HbA1C Level in Goal       Task: Educate patient on diabetes education self-management topics    Responsible User: Heidi Freedman RD      Task: Educate patient on benefits of regular glucose monitoring    Responsible User: Heidi Freedman RD      Task: Refer  patient to appropriate extended care team member, as needed (Medication Therapy Management, Behavioral Health, Physical Therapy, etc.)    Responsible User: Heidi Freedman RD      Task: Discuss diabetes treatment plan with patient Completed 11/10/2022   Responsible User: Heidi Freedman RD      Problem: Diabetes Self-Management Education Needed to Optimize Self-Care Behaviors       Goal: Understand diabetes pathophysiology and disease progression       Task: Provide education on diabetes pathophysiology and disease progression specfic to patient's diabetes type    Responsible User: Heidi Freedman RD      Goal: Healthy Eating - follow a healthy eating pattern for diabetes       Task: Provide education on portion control and consistency in amount, composition and timing of food intake    Responsible User: Heidi Freedman RD      Task: Provide education on managing carbohydrate intake (carbohydrate counting, plate planning method, etc.) Completed 11/10/2022   Responsible User: Heidi Freedman RD      Task: Provide education on weight management    Responsible User: Heidi Freedman RD      Task: Provide education on heart healthy eating    Responsible User: Heidi Freedman RD      Task: Provide education on eating out    Responsible User: Heidi Freedman RD      Task: Develop individualized healthy eating plan with patient    Responsible User: Heidi Freedman RD      Goal: Being Active - get regular physical activity, working up to at least 150 minutes per week       Task: Provide education on relationship of activity to glucose and precautions to take if at risk for low glucose Completed 11/10/2022   Responsible User: Heidi Freedman RD      Task: Discuss barriers to physical activity with patient    Responsible User: Heidi Freedman RD      Task: Develop physical activity plan with patient    Responsible User: Heidi Freedman RD      Task: Explore community resources including walking groups, assistance programs, and  home videos    Responsible User: Heidi Freedman RD      Goal: Monitoring - monitor glucose and ketones as directed       Task: Provide education on blood glucose monitoring (purpose, proper technique, frequency, glucose targets, interpreting results, when to use glucose control solution, sharps disposal) Completed 11/10/2022   Responsible User: Heidi Freedman RD      Task: Provide education on continuous glucose monitoring (sensor placement, use of blake or /reader, understanding glucose trends, alerts and alarms, differences between sensor glucose and blood glucose)    Responsible User: Heidi Freedman RD      Task: Provide education on ketone monitoring (when to monitor, frequency, etc.)    Responsible User: Heidi Freedman RD      Goal: Taking Medication - patient is consistently taking medications as directed    This Visit's Progress: 80%   Note:    Patient to take insulin as directed rapid acting insulin before every meal, long-acting insulin at at bedtime     Task: Provide education on action of prescribed medication, including when to take and possible side effects    Responsible User: Heidi Freedman RD      Task: Provide education on insulin and injectable diabetes medications, including administration, storage, site selection and rotation for injection sites Completed 11/10/2022   Responsible User: Heidi Freedman RD      Task: Discuss barriers to medication adherence with patient and provide management technique ideas as appropriate    Responsible User: Heidi Freedman RD      Task: Provide education on frequency and refill details of medications    Responsible User: Heidi Freedman RD      Goal: Problem Solving - know how to prevent and manage short-term diabetes complications       Task: Provide education on high blood glucose - causes, signs/symptoms, prevention and treatment Completed 11/10/2022   Responsible User: Heidi Freedman RD      Task: Provide education on low blood glucose - causes,  signs/symptoms, prevention, treatment, carrying a carbohydrate source at all times, and medical identification Completed 11/10/2022   Responsible User: Heidi Freedman RD      Task: Provide education on safe travel with diabetes    Responsible User: Heidi Freedman RD      Task: Provide education on how to care for diabetes on sick days    Responsible User: Heidi Freedman RD      Task: Provide education on when to call a health care provider    Responsible User: Heidi Freedman RD      Goal: Reducing Risks - know how to prevent and treat long-term diabetes complications       Task: Provide education on major complications of diabetes, prevention, early diagnostic measures and treatment of complications    Responsible User: Heidi Freedman RD      Task: Provide education on recommended care for dental, eye and foot health    Responsible User: Heidi Freedman RD      Task: Provide education on Hemoglobin A1c - goals and relationship to blood glucose levels Completed 11/10/2022   Responsible User: Heidi Freedman RD      Task: Provide education on recommendations for heart health - lipid levels and goals, blood pressure and goals, and aspirin therapy, if indicated    Responsible User: Heidi Freedman RD      Task: Provide education on tobacco cessation    Responsible User: Heidi Freedman RD      Goal: Healthy Coping - use available resources to cope with the challenges of managing diabetes       Task: Discuss recognizing feelings about having diabetes    Responsible User: Heidi rFeedman RD      Task: Provide education on the benefits of making appropriate lifestyle changes Completed 11/10/2022   Responsible User: Heidi Freedman RD      Task: Provide education on benefits of utilizing support systems    Responsible User: Heidi Freedman RD      Task: Discuss methods for coping with stress    Responsible User: Heidi Freedman RD      Task: Provide education on when to seek professional counseling    Responsible User:  Heidi Freedman, MILIND            Time Spent: 30 minutes billable time  Encounter Type: Individual via phone    Any diabetes medication dose changes were made via the CDE Protocol per the patient's referring provider. A copy of this encounter was shared with the provider.

## 2023-02-06 ENCOUNTER — TELEPHONE (OUTPATIENT)
Dept: FAMILY MEDICINE | Facility: CLINIC | Age: 63
End: 2023-02-06
Payer: COMMERCIAL

## 2023-02-06 DIAGNOSIS — E11.42 TYPE 2 DIABETES MELLITUS WITH DIABETIC POLYNEUROPATHY, WITHOUT LONG-TERM CURRENT USE OF INSULIN (H): Primary | ICD-10-CM

## 2023-02-06 NOTE — TELEPHONE ENCOUNTER
Reason for Call:  Other prescription    Detailed comments: Pt calling requesting alternative hypertension medication, his Endocrinologist told him to discontinue losartan since he thought it was causing headaches but to reach out to PCP for alternative. There is a previous encounter about this from 1/24 but I don't see anything was sent. Pt discontinued around 1/18 and has not checked his BP for several days but feels ok, denies any hypertension symptoms. Pt is also not sure why Alcyclovir was removed from medication list but he would like it added back.    Alcyclovir 400 MG tablet one by mouth every 8 hours    Pt aware Dr Harvey is out until Wednesday and is open to covering provider recommendations.     Phone Number Patient can be reached at: Home number on file [phone] (home)    Best Time: any    Can we leave a detailed message on this number? YES    Call taken on 2/6/2023 at 10:28 AM by Caron Angeles

## 2023-02-08 RX ORDER — LISINOPRIL 10 MG/1
10 TABLET ORAL DAILY
Qty: 30 TABLET | Refills: 1 | Status: SHIPPED | OUTPATIENT
Start: 2023-02-08 | End: 2023-02-10

## 2023-02-08 NOTE — TELEPHONE ENCOUNTER
I see the previous message 1/24 although this never made it to my inbox.  If they doned that after sending it it would have pulled it out of my inbox.  I suspect that is likely what happened.     We will have him switch to lisinopril instead.  Prescription faxed to pharmacy.  Have him come in in 2 weeks for nurse blood pressure check to ensure this dose is adequately controlling blood pressure.

## 2023-02-10 ENCOUNTER — OFFICE VISIT (OUTPATIENT)
Dept: FAMILY MEDICINE | Facility: CLINIC | Age: 63
End: 2023-02-10
Payer: COMMERCIAL

## 2023-02-10 VITALS
HEART RATE: 76 BPM | DIASTOLIC BLOOD PRESSURE: 86 MMHG | OXYGEN SATURATION: 99 % | WEIGHT: 240 LBS | TEMPERATURE: 97.6 F | HEIGHT: 70 IN | RESPIRATION RATE: 16 BRPM | BODY MASS INDEX: 34.36 KG/M2 | SYSTOLIC BLOOD PRESSURE: 152 MMHG

## 2023-02-10 DIAGNOSIS — B00.9 HSV (HERPES SIMPLEX VIRUS) INFECTION: ICD-10-CM

## 2023-02-10 DIAGNOSIS — E11.42 TYPE 2 DIABETES MELLITUS WITH DIABETIC POLYNEUROPATHY, WITHOUT LONG-TERM CURRENT USE OF INSULIN (H): Primary | ICD-10-CM

## 2023-02-10 LAB
HBA1C MFR BLD: 6.5 % (ref 0–5.6)
HOLD SPECIMEN: NORMAL
HOLD SPECIMEN: NORMAL

## 2023-02-10 PROCEDURE — 99214 OFFICE O/P EST MOD 30 MIN: CPT | Performed by: FAMILY MEDICINE

## 2023-02-10 PROCEDURE — 36415 COLL VENOUS BLD VENIPUNCTURE: CPT | Performed by: FAMILY MEDICINE

## 2023-02-10 PROCEDURE — 83036 HEMOGLOBIN GLYCOSYLATED A1C: CPT | Performed by: FAMILY MEDICINE

## 2023-02-10 RX ORDER — LISINOPRIL 10 MG/1
10 TABLET ORAL DAILY
Qty: 90 TABLET | Refills: 4 | Status: SHIPPED | OUTPATIENT
Start: 2023-02-10

## 2023-02-10 RX ORDER — INSULIN DEGLUDEC 100 U/ML
INJECTION, SOLUTION SUBCUTANEOUS
Qty: 30 ML | Refills: 3 | Status: SHIPPED | OUTPATIENT
Start: 2023-02-10

## 2023-02-10 RX ORDER — INSULIN ASPART 100 [IU]/ML
INJECTION, SOLUTION INTRAVENOUS; SUBCUTANEOUS
Qty: 45 ML | Refills: 3 | Status: SHIPPED | OUTPATIENT
Start: 2023-02-10

## 2023-02-10 RX ORDER — ACYCLOVIR 400 MG/1
400 TABLET ORAL EVERY 8 HOURS
Qty: 15 TABLET | Refills: 4 | Status: SHIPPED | OUTPATIENT
Start: 2023-02-10

## 2023-02-10 RX ORDER — LISINOPRIL 10 MG/1
10 TABLET ORAL DAILY
Qty: 90 TABLET | Refills: 4 | Status: SHIPPED | OUTPATIENT
Start: 2023-02-10 | End: 2023-02-10

## 2023-02-10 ASSESSMENT — PAIN SCALES - GENERAL: PAINLEVEL: SEVERE PAIN (6)

## 2023-02-10 NOTE — PROGRESS NOTES
"  Assessment & Plan     Type 2 diabetes mellitus with diabetic polyneuropathy, without long-term current use of insulin (H)  He had an episode of ketoacidosis but further work-up with Endo revealed this is just type 2 diabetes.  No evidence of adult onset type I.  Blood sugar control much improved.  Hemoglobin A1c down from 14.2 to 6.5 today. He is feeling much better overall.  Continue with current management plan.  - Hemoglobin A1c; Future  - Hemoglobin A1c  - lisinopril (ZESTRIL) 10 MG tablet; Take 1 tablet (10 mg) by mouth daily  - insulin degludec (TRESIBA FLEXTOUCH) 100 UNIT/ML pen; Inject 22 units daily and adjust according to instructions.  Max TDD 40 units.  - insulin aspart (NOVOLOG FLEXPEN RELION) 100 UNIT/ML pen; 10 units before all meals plus correction 150 - 180 +1u; 181 - 210 +2u; 211 - 240 + 3u; 241 - 270 +4u; 271 - 300 +5u.  May lower base dose from 10 to 6-8units if lower carbohydrate meal. Max TDD 50.    HSV (herpes simplex virus) infection  - acyclovir (ZOVIRAX) 400 MG tablet; Take 1 tablet (400 mg) by mouth every 8 hours             BMI:   Estimated body mass index is 34.44 kg/m  as calculated from the following:    Height as of this encounter: 1.778 m (5' 10\").    Weight as of this encounter: 108.9 kg (240 lb).           No follow-ups on file.    Norah Harvey MD  [facility]    Angel Anderson is a 62 year old, presenting for the following health issues:  Diabetes      History of Present Illness       Diabetes:   He presents for follow up of diabetes.  He is checking home blood glucose four or more times daily. He checks blood glucose before meals and at bedtime.  Blood glucose is never over 200 and sometimes under 70. He is aware of hypoglycemia symptoms including shakiness and weakness. He has no concerns regarding his diabetes at this time.  He is not experiencing numbness or burning in feet, excessive thirst, blurry vision, weight changes or redness, " "sores or blisters on feet. The patient has not had a diabetic eye exam in the last 12 months.         Hypertension: He presents for follow up of hypertension.  He does check blood pressure  regularly outside of the clinic. Outside blood pressures have been over 140/90. He does not follow a low salt diet.               Review of Systems   Constitutional, neuro, ENT, endocrine, pulmonary, cardiac, gastrointestinal, genitourinary, musculoskeletal, integument and psychiatric systems are negative, except as otherwise noted.       Objective    BP (!) 152/86   Pulse 76   Temp 97.6  F (36.4  C) (Tympanic)   Resp 16   Ht 1.778 m (5' 10\")   Wt 108.9 kg (240 lb)   SpO2 99%   BMI 34.44 kg/m    Body mass index is 34.44 kg/m .  Physical Exam   GENERAL: Pleasant, well appearing male.        Lab Results   Component Value Date    A1C 6.5 02/10/2023    A1C 14.2 11/01/2022    A1C 7.5 04/06/2022    A1C 8.1 07/23/2021                  "

## 2023-02-10 NOTE — NURSING NOTE
"Initial BP (!) 152/86   Pulse 76   Temp 97.6  F (36.4  C) (Tympanic)   Resp 16   Ht 1.778 m (5' 10\")   Wt 108.9 kg (240 lb)   SpO2 99%   BMI 34.44 kg/m   Estimated body mass index is 34.44 kg/m  as calculated from the following:    Height as of this encounter: 1.778 m (5' 10\").    Weight as of this encounter: 108.9 kg (240 lb). .      "

## 2023-02-13 ENCOUNTER — OFFICE VISIT (OUTPATIENT)
Dept: DERMATOLOGY | Facility: CLINIC | Age: 63
End: 2023-02-13
Payer: COMMERCIAL

## 2023-02-13 VITALS — SYSTOLIC BLOOD PRESSURE: 157 MMHG | OXYGEN SATURATION: 100 % | HEART RATE: 67 BPM | DIASTOLIC BLOOD PRESSURE: 78 MMHG

## 2023-02-13 DIAGNOSIS — L72.0 EPIDERMAL CYST: Primary | ICD-10-CM

## 2023-02-13 PROCEDURE — 88331 PATH CONSLTJ SURG 1 BLK 1SPC: CPT | Performed by: DERMATOLOGY

## 2023-02-13 PROCEDURE — 12041 INTMD RPR N-HF/GENIT 2.5CM/<: CPT | Performed by: DERMATOLOGY

## 2023-02-13 PROCEDURE — 11442 EXC FACE-MM B9+MARG 1.1-2 CM: CPT | Performed by: DERMATOLOGY

## 2023-02-13 NOTE — PROGRESS NOTES
Brent Stewart is an extremely pleasant 62 year old year old male patient here today for evaluation and managment of cys ton right cheek.  Patient has no other skin complaints today.  Remainder of the HPI, Meds, PMH, Allergies, FH, and SH was reviewed in chart.      Past Medical History:   Diagnosis Date     Hypertension        Past Surgical History:   Procedure Laterality Date     ARTHROSCOPY KNEE RT/LT Right     meniscectomy     HERNIA REPAIR, UMBILICAL  1980     JOINT REPLACEMTN, KNEE RT/LT Right 2015    Joint Replacement knee RT/LT     PHACOEMULSIFICATION WITH STANDARD INTRAOCULAR LENS IMPLANT  2014    Procedure: PHACOEMULSIFICATION WITH STANDARD INTRAOCULAR LENS IMPLANT;  Surgeon: Issac Lee MD;  Location: [facility]     PHACOEMULSIFICATION WITH STANDARD INTRAOCULAR LENS IMPLANT  7/3/2014    Procedure: PHACOEMULSIFICATION WITH STANDARD INTRAOCULAR LENS IMPLANT;  Surgeon: Issac Lee MD;  Location: [facility]        Family History   Problem Relation Age of Onset     Diabetes Mother      Cerebrovascular Disease Mother      Diabetes Father      Cancer Father      Coronary Artery Disease Early Onset Father 55        CABG       Social History     Socioeconomic History     Marital status: Single     Spouse name: Not on file     Number of children: Not on file     Years of education: Not on file     Highest education level: Not on file   Occupational History     Not on file   Tobacco Use     Smoking status: Former     Types: Cigarettes     Quit date: 2005     Years since quittin.8     Smokeless tobacco: Former   Substance and Sexual Activity     Alcohol use: Yes     Comment: occas.     Drug use: No     Sexual activity: Yes     Partners: Male   Other Topics Concern     Parent/sibling w/ CABG, MI or angioplasty before 65F 55M? Not Asked   Social History Narrative     Not on file     Social Determinants of Health     Financial Resource Strain: Not on file   Food Insecurity: Not on file    Transportation Needs: Not on file   Physical Activity: Not on file   Stress: Not on file   Social Connections: Not on file   Intimate Partner Violence: Not on file   Housing Stability: Not on file       Outpatient Encounter Medications as of 2/13/2023   Medication Sig Dispense Refill     acyclovir (ZOVIRAX) 400 MG tablet Take 1 tablet (400 mg) by mouth every 8 hours 15 tablet 4     blood glucose (NO BRAND SPECIFIED) test strip Use to test blood sugar 3 times daily or as directed.  One Touch Verio test strips 400 strip 3     ciclopirox (LOPROX) 0.77 % cream Apply twice daily to hands and feet. 90 g 4     Continuous Blood Gluc  (FREESTYLE RANDALL 2 READER) WINDY 1 each continuous 1 each 0     Continuous Blood Gluc Sensor (FREESTYLE RANDALL 2 SENSOR) MISC 1 each every 14 days 6 each 3     Glucagon (GVOKE HYPOPEN 2-PACK) 1 MG/0.2ML SOAJ Inject 1 mg Subcutaneous as needed (To be used if patient is unable to safely consume oral rapid acting carbohydrate) 0.4 mL 0     insulin aspart (NOVOLOG FLEXPEN RELION) 100 UNIT/ML pen 10 units before all meals plus correction 150 - 180 +1u; 181 - 210 +2u; 211 - 240 + 3u; 241 - 270 +4u; 271 - 300 +5u.  May lower base dose from 10 to 6-8units if lower carbohydrate meal. Max TDD 50. 45 mL 3     insulin degludec (TRESIBA FLEXTOUCH) 100 UNIT/ML pen Inject 22 units daily and adjust according to instructions.  Max TDD 40 units. 30 mL 3     insulin pen needle (32G X 6 MM) 32G X 6 MM miscellaneous Use insulin pen needles daily or as directed. 110 each 0     lisinopril (ZESTRIL) 10 MG tablet Take 1 tablet (10 mg) by mouth daily 90 tablet 4     MULTIPLE VITAMIN PO Take 1 tablet by mouth daily       OMEPRAZOLE PO Take 20 mg by mouth daily       sildenafil (REVATIO) 20 MG tablet Take 1-3 tablets (20-60 mg) by mouth daily as needed (1 hour prior to sexual activity) 30 tablet 4     triamcinolone (KENALOG) 0.1 % external ointment Apply topically 2 times daily 30 g 1     vitamin B-12  (CYANOCOBALAMIN) 100 MCG tablet Take 100 mcg by mouth daily       vitamin D3 (CHOLECALCIFEROL) 50 mcg (2000 units) tablet Take 1 tablet by mouth daily       No facility-administered encounter medications on file as of 2/13/2023.             O:   NAD, WDWN, Alert & Oriented, Mood & Affect wnl, Vitals stable   Here today alone   BP (!) 157/78   Pulse 67   SpO2 100%    General appearance normal   Vitals stable   Alert, oriented and in no acute distress     R cheek 1.4cm nodule with comedone      Eyes: Conjunctivae/lids:Normal     ENT: Lips, buccal mucosa, tongue: normal    MSK:Normal    Cardiovascular: peripheral edema none    Pulm: Breathing Normal    Neuro/Psych: Orientation:Alert and Orientedx3 ; Mood/Affect:normal       MICRO:   R cheek: cyst lined by stratified squamous epithelium with epidermal keratinization   A/P:  1. R cheek epidermal cyst  EXCISION OF CYST AND int: After thorough discussion of PGACAC, consent obtained, anesthesia and prep, the margins of the cyst were identified and an incision was made encompassing the cyst. The incisions were made through the skin and down to and including the subcutaneous tissue. The cyst was removed en bloc and submitted for frozen pathologic review. The wound edges were undermined until adequate tissue mobility was obtained. hemostasis was achieved. The wound edges were then closed in a layered fashion with 5 vicryl and 5 fast, , being careful not to leave any dead space. Postoperative length was 2 cm.   EBL minimal; complications none; wound care routine. The patient was discharged in good condition and will return in one week for wound evaluation.  It was a pleasure speaking to Brent Stewart today.

## 2023-02-13 NOTE — PATIENT INSTRUCTIONS
Sutured Wound Care     [facility]: [phone]    [facility]: [phone]          No strenuous activity for 48 hours. Resume moderate activity in 48 hours. No heavy exercising until you are seen for follow up in one week.     Take Tylenol as needed for discomfort.                         Do not drink alcoholic beverages for 48 hours.     Keep the pressure bandage in place for 24 hours. If the bandage becomes blood tinged or loose, reinforce it with gauze and tape.        (Refer to the reverse side of this page for management of bleeding).    Remove pressure bandage in 24 hours     Leave the flat bandage in place until your follow up appointment.    Keep the bandage dry. Wash around it carefully.    If the tape becomes soiled or starts to come off, reinforce it with additional paper tape.    Do not smoke for 3 weeks; smoking is detrimental to wound healing.    It is normal to have swelling and bruising around the surgical site. The bruising will fade in approximately 10-14 days. Elevate the area to reduce swelling.    Numbness, itchiness and sensitivity to temperature changes can occur after surgery and may take up to 18 months to normalize.      POSSIBLE COMPLICATIONS    BLEEDING:    Leave the bandage in place.  Use tightly rolled up gauze or a cloth to apply direct pressure over the bandage for 20   minutes.  Reapply pressure for an additional 20 minutes if necessary  Call the office or go to the nearest emergency room if pressure fails to stop the bleeding.  Use additional gauze and tape to maintain pressure once the bleeding has stopped.        PAIN:    Post operative pain should slowly get better, never worse.  A severe increase in pain may indicate a problem. Call the office if this occurs.    In case of emergency phone:Dr Suazo [phone]

## 2023-02-13 NOTE — LETTER
2023         RE: Brent Stewart  [address]  [facility] [address]        Dear Colleague,    Thank you for referring your patient, Brent Stewart, to the [facility]. Please see a copy of my visit note below.    Brent Stewart is an extremely pleasant 62 year old year old male patient here today for evaluation and managment of cys ton right cheek.  Patient has no other skin complaints today.  Remainder of the HPI, Meds, PMH, Allergies, FH, and SH was reviewed in chart.      Past Medical History:   Diagnosis Date     Hypertension        Past Surgical History:   Procedure Laterality Date     ARTHROSCOPY KNEE RT/LT Right     meniscectomy     HERNIA REPAIR, UMBILICAL  1980     JOINT REPLACEMTN, KNEE RT/LT Right 2015    Joint Replacement knee RT/LT     PHACOEMULSIFICATION WITH STANDARD INTRAOCULAR LENS IMPLANT  2014    Procedure: PHACOEMULSIFICATION WITH STANDARD INTRAOCULAR LENS IMPLANT;  Surgeon: Issac Lee MD;  Location: [facility]     PHACOEMULSIFICATION WITH STANDARD INTRAOCULAR LENS IMPLANT  7/3/2014    Procedure: PHACOEMULSIFICATION WITH STANDARD INTRAOCULAR LENS IMPLANT;  Surgeon: Issac Lee MD;  Location: [facility]        Family History   Problem Relation Age of Onset     Diabetes Mother      Cerebrovascular Disease Mother      Diabetes Father      Cancer Father      Coronary Artery Disease Early Onset Father 55        CABG       Social History     Socioeconomic History     Marital status: Single     Spouse name: Not on file     Number of children: Not on file     Years of education: Not on file     Highest education level: Not on file   Occupational History     Not on file   Tobacco Use     Smoking status: Former     Types: Cigarettes     Quit date: 2005     Years since quittin.8     Smokeless tobacco: Former   Substance and Sexual Activity     Alcohol use: Yes     Comment: occas.     Drug use: No     Sexual activity: Yes     Partners: Male    Other Topics Concern     Parent/sibling w/ CABG, MI or angioplasty before 65F 55M? Not Asked   Social History Narrative     Not on file     Social Determinants of Health     Financial Resource Strain: Not on file   Food Insecurity: Not on file   Transportation Needs: Not on file   Physical Activity: Not on file   Stress: Not on file   Social Connections: Not on file   Intimate Partner Violence: Not on file   Housing Stability: Not on file       Outpatient Encounter Medications as of 2/13/2023   Medication Sig Dispense Refill     acyclovir (ZOVIRAX) 400 MG tablet Take 1 tablet (400 mg) by mouth every 8 hours 15 tablet 4     blood glucose (NO BRAND SPECIFIED) test strip Use to test blood sugar 3 times daily or as directed.  One Touch Verio test strips 400 strip 3     ciclopirox (LOPROX) 0.77 % cream Apply twice daily to hands and feet. 90 g 4     Continuous Blood Gluc  (FREESTYLE RANDALL 2 READER) WINDY 1 each continuous 1 each 0     Continuous Blood Gluc Sensor (FREESTYLE RANDALL 2 SENSOR) MISC 1 each every 14 days 6 each 3     Glucagon (GVOKE HYPOPEN 2-PACK) 1 MG/0.2ML SOAJ Inject 1 mg Subcutaneous as needed (To be used if patient is unable to safely consume oral rapid acting carbohydrate) 0.4 mL 0     insulin aspart (NOVOLOG FLEXPEN RELION) 100 UNIT/ML pen 10 units before all meals plus correction 150 - 180 +1u; 181 - 210 +2u; 211 - 240 + 3u; 241 - 270 +4u; 271 - 300 +5u.  May lower base dose from 10 to 6-8units if lower carbohydrate meal. Max TDD 50. 45 mL 3     insulin degludec (TRESIBA FLEXTOUCH) 100 UNIT/ML pen Inject 22 units daily and adjust according to instructions.  Max TDD 40 units. 30 mL 3     insulin pen needle (32G X 6 MM) 32G X 6 MM miscellaneous Use insulin pen needles daily or as directed. 110 each 0     lisinopril (ZESTRIL) 10 MG tablet Take 1 tablet (10 mg) by mouth daily 90 tablet 4     MULTIPLE VITAMIN PO Take 1 tablet by mouth daily       OMEPRAZOLE PO Take 20 mg by mouth daily        sildenafil (REVATIO) 20 MG tablet Take 1-3 tablets (20-60 mg) by mouth daily as needed (1 hour prior to sexual activity) 30 tablet 4     triamcinolone (KENALOG) 0.1 % external ointment Apply topically 2 times daily 30 g 1     vitamin B-12 (CYANOCOBALAMIN) 100 MCG tablet Take 100 mcg by mouth daily       vitamin D3 (CHOLECALCIFEROL) 50 mcg (2000 units) tablet Take 1 tablet by mouth daily       No facility-administered encounter medications on file as of 2/13/2023.             O:   NAD, WDWN, Alert & Oriented, Mood & Affect wnl, Vitals stable   Here today alone   BP (!) 157/78   Pulse 67   SpO2 100%    General appearance normal   Vitals stable   Alert, oriented and in no acute distress     R cheek 1.4cm nodule with comedone      Eyes: Conjunctivae/lids:Normal     ENT: Lips, buccal mucosa, tongue: normal    MSK:Normal    Cardiovascular: peripheral edema none    Pulm: Breathing Normal    Neuro/Psych: Orientation:Alert and Orientedx3 ; Mood/Affect:normal       MICRO:   R cheek: cyst lined by stratified squamous epithelium with epidermal keratinization   A/P:  1. R cheek epidermal cyst  EXCISION OF CYST AND int: After thorough discussion of PGACAC, consent obtained, anesthesia and prep, the margins of the cyst were identified and an incision was made encompassing the cyst. The incisions were made through the skin and down to and including the subcutaneous tissue. The cyst was removed en bloc and submitted for frozen pathologic review. The wound edges were undermined until adequate tissue mobility was obtained. hemostasis was achieved. The wound edges were then closed in a layered fashion with 5 vicryl and 5 fast, , being careful not to leave any dead space. Postoperative length was 2 cm.   EBL minimal; complications none; wound care routine. The patient was discharged in good condition and will return in one week for wound evaluation.  It was a pleasure speaking to Brent Stewart today.        Again, thank you for  allowing me to participate in the care of your patient.        Sincerely,        Edwin Suazo MD

## 2023-02-14 ENCOUNTER — CONTACT MOVED (OUTPATIENT)
Age: 63
End: 2023-02-14
Payer: COMMERCIAL

## 2023-02-20 ENCOUNTER — ALLIED HEALTH/NURSE VISIT (OUTPATIENT)
Dept: DERMATOLOGY | Facility: CLINIC | Age: 63
End: 2023-02-20
Payer: COMMERCIAL

## 2023-02-20 DIAGNOSIS — Z48.01 ENCOUNTER FOR CHANGE OR REMOVAL OF SURGICAL WOUND DRESSING: Primary | ICD-10-CM

## 2023-02-20 PROCEDURE — 99207 PR NO CHARGE NURSE ONLY: CPT

## 2023-02-20 NOTE — PROGRESS NOTES
Brent Stewart comes into clinic today at the request of Dr. Suazo Ordering Provider for Wound Check Action taken: See Below.    This service provided today was under the supervising provider of the day Dr. Suazo, who was available if needed.    Pt returned to clinic for post surgery 1 week follow up bandage change. Pt has no complaints, denies pain. States that bandage fell from right cheek 2/18/23 due to facial hair. Right cheek cleansed with normal saline. Site is healing and wound edges approximating well. Reapplied new steri strips and paper tape.    Advised to watch for signs/sx of infection; spreading redness, drainage, odor, fever. Call or report promptly to clinic. Pt given written instructions and informed to rtc as needed. Patient verbalized understanding.     Aleja FAUST,  CMA

## 2023-02-20 NOTE — PATIENT INSTRUCTIONS
WOUND CARE INSTRUCTIONS  for  ONE WEEK AFTER SURGERY          Leave flat bandage on your skin for one week after today s bandage change.  In one week when you remove the bandage, you may resume your regular skin care routine, including washing with mild soap and water, applying moisturizer, make-up and sunscreen.    If there are any open or bleeding areas at the incision/graft site you should begin to cover the area with a bandage daily as follows:    Clean and dry the area with plain tap water using a Q-tip or sterile gauze pad.  Apply Polysporin or Bacitracin ointment to the open area.  Cover the wound with a band-aid or a sterile non-stick gauze pad and micropore paper tape.         SIGNS OF INFECTION  - If you notice any of these signs of infection, call your doctor right away: expanding redness around the wound.  - Yellow or greenish-colored pus or cloudy wound drainage.    - Red streaking spreading from the wound.  - Increased swelling, tenderness, or pain around the wound.   - Fever.    Please remember that yellow and clear drainage from a wound can be normal and related to normal wound healing.  Isolated drainage from a wound without a combination of the above features does not indicate infection.       *Once the bandages are removed, the scar will be red and firm (especially in the lip/chin area). This is normal and will fade in time. It might take 6-12 months for this to happen.     *Massaging the area will help the scar soften and fade quicker. Begin to massage the area one month after the bandages have been removed. To massage apply pressure directly and firmly over the scar with the fingertips and move in a circular motion. Massage the area for a few minutes several times a day. Continue to massage the site for several months.    *Approximately 6-8 weeks after surgery it is not uncommon to see the formation of  tender pimple-like  bump along the scar. This is normal. As the scar continues to mature and  the stitches underneath the skin begin to dissolve, this might occur. Do not pick or squeeze, this will resolve on it s own. Should one break open producing a small amount of drainage, apply Polysporin or Bacitracin ointment a few times a day until the wound is completely healed.    *Numbness in the surgical area is expected. It might take 12-18 months for the feeling to return to normal. During this time sensations of itchiness, tingling and occasional sharp pains might be noted. These feelings are normal and will subside once the nerves have completely healed.         IN CASE OF EMERGENCY: Dr Suazo [phone]     If you were seen in [place] call: [phone]    If you were seen in [place] call: [phone]

## 2023-02-23 PROBLEM — E66.01 MORBID OBESITY (H): Status: RESOLVED | Noted: 2022-08-16 | Resolved: 2023-02-23

## 2023-02-24 ENCOUNTER — TELEPHONE (OUTPATIENT)
Dept: DERMATOLOGY | Facility: CLINIC | Age: 63
End: 2023-02-24
Payer: COMMERCIAL

## 2023-02-24 NOTE — CONFIDENTIAL NOTE
Called patient- scheduled next available.      Thank you,    Araceli MANLEYRN BSN  [facility]- [phone]

## 2023-02-24 NOTE — CONFIDENTIAL NOTE
Called - left voice mail- next procedure spot was 4/12/23.  Call me back to schedule this.    Thank you,    Araceli MANLEY RN BSN  [facility]- [phone]

## 2023-02-24 NOTE — TELEPHONE ENCOUNTER
[facility] Call Center    Phone Message    May a detailed message be left on voicemail: no     Reason for Call: Other: Justin calling Araceli back.  [phone]     Action Taken: Other: [facility]    Travel Screening: Not Applicable

## 2023-02-24 NOTE — TELEPHONE ENCOUNTER
[facility] Call Center    Phone Message    May a detailed message be left on voicemail: yes     Reason for Call: Appointment Intake    Referring Provider Name: NA  Diagnosis and/or Symptoms: Cyst excision   Pt was scheduled 02/28/23 and had a conflict with schedule. Pt canceled Appt and will need a call back to reschedule. Thanks     Action Taken: Message routed to:  [facility]erm    Travel Screening: Not Applicable

## 2023-04-11 ENCOUNTER — OFFICE VISIT (OUTPATIENT)
Dept: DERMATOLOGY | Facility: CLINIC | Age: 63
End: 2023-04-11
Payer: COMMERCIAL

## 2023-04-11 DIAGNOSIS — L72.0 EPIDERMAL CYST: Primary | ICD-10-CM

## 2023-04-11 PROCEDURE — 11424 EXC H-F-NK-SP B9+MARG 3.1-4: CPT | Performed by: DERMATOLOGY

## 2023-04-11 PROCEDURE — 13132 CMPLX RPR F/C/C/M/N/AX/G/H/F: CPT | Performed by: DERMATOLOGY

## 2023-04-11 PROCEDURE — 88331 PATH CONSLTJ SURG 1 BLK 1SPC: CPT | Performed by: DERMATOLOGY

## 2023-04-11 ASSESSMENT — PAIN SCALES - GENERAL: PAINLEVEL: NO PAIN (0)

## 2023-04-11 NOTE — LETTER
2023         RE: Brent Stewart  [address]  [facility] [address]        Dear Colleague,    Thank you for referring your patient, Brent Stewart, to the [facility]. Please see a copy of my visit note below.    Brent Stewart is an extremely pleasant 62 year old year old male patient here today for evaluation and managment of cyst on mid post neck.  Patient has no other skin complaints today.  Remainder of the HPI, Meds, PMH, Allergies, FH, and SH was reviewed in chart.      Past Medical History:   Diagnosis Date     Hypertension        Past Surgical History:   Procedure Laterality Date     ARTHROSCOPY KNEE RT/LT Right     meniscectomy     HERNIA REPAIR, UMBILICAL  1980     JOINT REPLACEMTN, KNEE RT/LT Right 2015    Joint Replacement knee RT/LT     PHACOEMULSIFICATION WITH STANDARD INTRAOCULAR LENS IMPLANT  2014    Procedure: PHACOEMULSIFICATION WITH STANDARD INTRAOCULAR LENS IMPLANT;  Surgeon: Issac Lee MD;  Location: [facility] OR     PHACOEMULSIFICATION WITH STANDARD INTRAOCULAR LENS IMPLANT  7/3/2014    Procedure: PHACOEMULSIFICATION WITH STANDARD INTRAOCULAR LENS IMPLANT;  Surgeon: Issac Lee MD;  Location: [facility] OR        Family History   Problem Relation Age of Onset     Diabetes Mother      Cerebrovascular Disease Mother      Diabetes Father      Cancer Father      Coronary Artery Disease Early Onset Father 55        CABG       Social History     Socioeconomic History     Marital status: Single     Spouse name: Not on file     Number of children: Not on file     Years of education: Not on file     Highest education level: Not on file   Occupational History     Not on file   Tobacco Use     Smoking status: Former     Types: Cigarettes     Quit date: 2005     Years since quittin.0     Smokeless tobacco: Former   Vaping Use     Vaping status: Not on file   Substance and Sexual Activity     Alcohol use: Yes     Comment: occas.     Drug use: No      Sexual activity: Yes     Partners: Male   Other Topics Concern     Parent/sibling w/ CABG, MI or angioplasty before 65F 55M? Not Asked   Social History Narrative     Not on file     Social Determinants of Health     Financial Resource Strain: Not on file   Food Insecurity: Not on file   Transportation Needs: Not on file   Physical Activity: Not on file   Stress: Not on file   Social Connections: Not on file   Intimate Partner Violence: Not on file   Housing Stability: Not on file       Outpatient Encounter Medications as of 4/11/2023   Medication Sig Dispense Refill     acyclovir (ZOVIRAX) 400 MG tablet Take 1 tablet (400 mg) by mouth every 8 hours 15 tablet 4     blood glucose (NO BRAND SPECIFIED) test strip Use to test blood sugar 3 times daily or as directed.  One Touch Verio test strips 400 strip 3     ciclopirox (LOPROX) 0.77 % cream Apply twice daily to hands and feet. 90 g 4     Continuous Blood Gluc  (FREESTYLE RANDALL 2 READER) WINDY 1 each continuous 1 each 0     Continuous Blood Gluc Sensor (FREESTYLE RANDALL 2 SENSOR) MISC 1 each every 14 days 6 each 3     Glucagon (GVOKE HYPOPEN 2-PACK) 1 MG/0.2ML SOAJ Inject 1 mg Subcutaneous as needed (To be used if patient is unable to safely consume oral rapid acting carbohydrate) 0.4 mL 0     insulin aspart (NOVOLOG FLEXPEN RELION) 100 UNIT/ML pen 10 units before all meals plus correction 150 - 180 +1u; 181 - 210 +2u; 211 - 240 + 3u; 241 - 270 +4u; 271 - 300 +5u.  May lower base dose from 10 to 6-8units if lower carbohydrate meal. Max TDD 50. 45 mL 3     insulin degludec (TRESIBA FLEXTOUCH) 100 UNIT/ML pen Inject 22 units daily and adjust according to instructions.  Max TDD 40 units. 30 mL 3     insulin pen needle (32G X 6 MM) 32G X 6 MM miscellaneous Use insulin pen needles daily or as directed. 110 each 0     lisinopril (ZESTRIL) 10 MG tablet Take 1 tablet (10 mg) by mouth daily 90 tablet 4     MULTIPLE VITAMIN PO Take 1 tablet by mouth daily        OMEPRAZOLE PO Take 20 mg by mouth daily       sildenafil (REVATIO) 20 MG tablet Take 1-3 tablets (20-60 mg) by mouth daily as needed (1 hour prior to sexual activity) 30 tablet 4     triamcinolone (KENALOG) 0.1 % external ointment Apply topically 2 times daily 30 g 1     vitamin B-12 (CYANOCOBALAMIN) 100 MCG tablet Take 100 mcg by mouth daily       vitamin D3 (CHOLECALCIFEROL) 50 mcg (2000 units) tablet Take 1 tablet by mouth daily       No facility-administered encounter medications on file as of 4/11/2023.             O:   NAD, WDWN, Alert & Oriented, Mood & Affect wnl, Vitals stable   Here today alone    General appearance normal   Vitals stable   Alert, oriented and in no acute distress     Mid post neck 3.5cm nodule      Eyes: Conjunctivae/lids:Normal     ENT: Lips, buccal mucosa, tongue: normal    MSK:Normal    Cardiovascular: peripheral edema none    Pulm: Breathing Normal    Neuro/Psych: Orientation:Alert and Orientedx3 ; Mood/Affect:normal       MICRO:   Mid post neck: cyst lined by stratified squamous epithelium with epidermal keratinization   A/P:  1. Mid post neck epidermal cyst  EXCISION OF CYST AND COMPLEX: After thorough discussion of PGACAC, consent obtained, anesthesia and prep, the margins of the cyst were identified and an elliptical incision was made encompassing the cyst. The incisions were made through the skin and down to and including the subcutaneous tissue. The cyst was removed en bloc and submitted for frozen pathologic review. The wound edges were widely undermined until adequate tissue mobility was obtained. hemostasis was achieved. The wound edges were then closed in a layered fashion, being careful not to leave any dead space. Postoperative length was 2.6 cm.   EBL minimal; complications none; wound care routine. The patient was discharged in good condition and will return in one week for wound evaluation.  It was a pleasure speaking to Brent Stewart today.  Previous clinic notes  and pertinent laboratory tests were reviewed prior to Brent Stewart's visit.        Again, thank you for allowing me to participate in the care of your patient.        Sincerely,        Edwin Suazo MD

## 2023-04-11 NOTE — PROGRESS NOTES
Brent Stewart is an extremely pleasant 62 year old year old male patient here today for evaluation and managment of cyst on mid post neck.  Patient has no other skin complaints today.  Remainder of the HPI, Meds, PMH, Allergies, FH, and SH was reviewed in chart.      Past Medical History:   Diagnosis Date     Hypertension        Past Surgical History:   Procedure Laterality Date     ARTHROSCOPY KNEE RT/LT Right     meniscectomy     HERNIA REPAIR, UMBILICAL  1980     JOINT REPLACEMTN, KNEE RT/LT Right 2015    Joint Replacement knee RT/LT     PHACOEMULSIFICATION WITH STANDARD INTRAOCULAR LENS IMPLANT  2014    Procedure: PHACOEMULSIFICATION WITH STANDARD INTRAOCULAR LENS IMPLANT;  Surgeon: Issac Lee MD;  Location: [facility] OR     PHACOEMULSIFICATION WITH STANDARD INTRAOCULAR LENS IMPLANT  7/3/2014    Procedure: PHACOEMULSIFICATION WITH STANDARD INTRAOCULAR LENS IMPLANT;  Surgeon: Issac Lee MD;  Location: [facility] OR        Family History   Problem Relation Age of Onset     Diabetes Mother      Cerebrovascular Disease Mother      Diabetes Father      Cancer Father      Coronary Artery Disease Early Onset Father 55        CABG       Social History     Socioeconomic History     Marital status: Single     Spouse name: Not on file     Number of children: Not on file     Years of education: Not on file     Highest education level: Not on file   Occupational History     Not on file   Tobacco Use     Smoking status: Former     Types: Cigarettes     Quit date: 2005     Years since quittin.0     Smokeless tobacco: Former   Vaping Use     Vaping status: Not on file   Substance and Sexual Activity     Alcohol use: Yes     Comment: occas.     Drug use: No     Sexual activity: Yes     Partners: Male   Other Topics Concern     Parent/sibling w/ CABG, MI or angioplasty before 65F 55M? Not Asked   Social History Narrative     Not on file     Social Determinants of Health     Financial Resource Strain:  Not on file   Food Insecurity: Not on file   Transportation Needs: Not on file   Physical Activity: Not on file   Stress: Not on file   Social Connections: Not on file   Intimate Partner Violence: Not on file   Housing Stability: Not on file       Outpatient Encounter Medications as of 4/11/2023   Medication Sig Dispense Refill     acyclovir (ZOVIRAX) 400 MG tablet Take 1 tablet (400 mg) by mouth every 8 hours 15 tablet 4     blood glucose (NO BRAND SPECIFIED) test strip Use to test blood sugar 3 times daily or as directed.  One Touch Verio test strips 400 strip 3     ciclopirox (LOPROX) 0.77 % cream Apply twice daily to hands and feet. 90 g 4     Continuous Blood Gluc  (FREESTYLE RANDALL 2 READER) WINDY 1 each continuous 1 each 0     Continuous Blood Gluc Sensor (FREESTYLE RANDALL 2 SENSOR) MISC 1 each every 14 days 6 each 3     Glucagon (GVOKE HYPOPEN 2-PACK) 1 MG/0.2ML SOAJ Inject 1 mg Subcutaneous as needed (To be used if patient is unable to safely consume oral rapid acting carbohydrate) 0.4 mL 0     insulin aspart (NOVOLOG FLEXPEN RELION) 100 UNIT/ML pen 10 units before all meals plus correction 150 - 180 +1u; 181 - 210 +2u; 211 - 240 + 3u; 241 - 270 +4u; 271 - 300 +5u.  May lower base dose from 10 to 6-8units if lower carbohydrate meal. Max TDD 50. 45 mL 3     insulin degludec (TRESIBA FLEXTOUCH) 100 UNIT/ML pen Inject 22 units daily and adjust according to instructions.  Max TDD 40 units. 30 mL 3     insulin pen needle (32G X 6 MM) 32G X 6 MM miscellaneous Use insulin pen needles daily or as directed. 110 each 0     lisinopril (ZESTRIL) 10 MG tablet Take 1 tablet (10 mg) by mouth daily 90 tablet 4     MULTIPLE VITAMIN PO Take 1 tablet by mouth daily       OMEPRAZOLE PO Take 20 mg by mouth daily       sildenafil (REVATIO) 20 MG tablet Take 1-3 tablets (20-60 mg) by mouth daily as needed (1 hour prior to sexual activity) 30 tablet 4     triamcinolone (KENALOG) 0.1 % external ointment Apply topically 2 times  daily 30 g 1     vitamin B-12 (CYANOCOBALAMIN) 100 MCG tablet Take 100 mcg by mouth daily       vitamin D3 (CHOLECALCIFEROL) 50 mcg (2000 units) tablet Take 1 tablet by mouth daily       No facility-administered encounter medications on file as of 4/11/2023.             O:   NAD, WDWN, Alert & Oriented, Mood & Affect wnl, Vitals stable   Here today alone    General appearance normal   Vitals stable   Alert, oriented and in no acute distress     Mid post neck 3.5cm nodule      Eyes: Conjunctivae/lids:Normal     ENT: Lips, buccal mucosa, tongue: normal    MSK:Normal    Cardiovascular: peripheral edema none    Pulm: Breathing Normal    Neuro/Psych: Orientation:Alert and Orientedx3 ; Mood/Affect:normal       MICRO:   Mid post neck: cyst lined by stratified squamous epithelium with epidermal keratinization   A/P:  1. Mid post neck epidermal cyst  EXCISION OF CYST AND COMPLEX: After thorough discussion of PGACAC, consent obtained, anesthesia and prep, the margins of the cyst were identified and an elliptical incision was made encompassing the cyst. The incisions were made through the skin and down to and including the subcutaneous tissue. The cyst was removed en bloc and submitted for frozen pathologic review. The wound edges were widely undermined until adequate tissue mobility was obtained. hemostasis was achieved. The wound edges were then closed in a layered fashion, being careful not to leave any dead space. Postoperative length was 2.6 cm.   EBL minimal; complications none; wound care routine. The patient was discharged in good condition and will return in one week for wound evaluation.  It was a pleasure speaking to Brent Stewart today.  Previous clinic notes and pertinent laboratory tests were reviewed prior to Brent Stewart's visit.

## 2023-04-11 NOTE — PATIENT INSTRUCTIONS
Sutured Wound Care     [facility]: [phone]    [facility]: [phone]          No strenuous activity for 48 hours. Resume moderate activity in 48 hours. No heavy exercising until you are seen for follow up in one week.     Take Tylenol as needed for discomfort.                         Do not drink alcoholic beverages for 48 hours.     Keep the pressure bandage in place for 24 hours. If the bandage becomes blood tinged or loose, reinforce it with gauze and tape.        (Refer to the reverse side of this page for management of bleeding).    Remove pressure bandage in 24 hours     Leave the flat bandage in place until your follow up appointment.    Keep the bandage dry. Wash around it carefully.    If the tape becomes soiled or starts to come off, reinforce it with additional paper tape.    Do not smoke for 3 weeks; smoking is detrimental to wound healing.    It is normal to have swelling and bruising around the surgical site. The bruising will fade in approximately 10-14 days. Elevate the area to reduce swelling.    Numbness, itchiness and sensitivity to temperature changes can occur after surgery and may take up to 18 months to normalize.      POSSIBLE COMPLICATIONS    BLEEDING:    Leave the bandage in place.  Use tightly rolled up gauze or a cloth to apply direct pressure over the bandage for 20   minutes.  Reapply pressure for an additional 20 minutes if necessary  Call the office or go to the nearest emergency room if pressure fails to stop the bleeding.  Use additional gauze and tape to maintain pressure once the bleeding has stopped.        PAIN:    Post operative pain should slowly get better, never worse.  A severe increase in pain may indicate a problem. Call the office if this occurs.    In case of emergency phone:Dr Suazo [phone]

## 2023-06-14 ENCOUNTER — OFFICE VISIT (OUTPATIENT)
Dept: DERMATOLOGY | Facility: CLINIC | Age: 63
End: 2023-06-14
Payer: COMMERCIAL

## 2023-06-14 DIAGNOSIS — L72.0 EPIDERMAL CYST: Primary | ICD-10-CM

## 2023-06-14 PROCEDURE — 11442 EXC FACE-MM B9+MARG 1.1-2 CM: CPT | Performed by: DERMATOLOGY

## 2023-06-14 PROCEDURE — 12041 INTMD RPR N-HF/GENIT 2.5CM/<: CPT | Performed by: DERMATOLOGY

## 2023-06-14 PROCEDURE — 88331 PATH CONSLTJ SURG 1 BLK 1SPC: CPT | Performed by: DERMATOLOGY

## 2023-06-14 ASSESSMENT — PAIN SCALES - GENERAL: PAINLEVEL: NO PAIN (0)

## 2023-06-14 NOTE — PATIENT INSTRUCTIONS
Sutured Wound Care     [facility]: [phone]    [facility]: [phone]          No strenuous activity for 48 hours. Resume moderate activity in 48 hours. No heavy exercising until you are seen for follow up in one week.     Take Tylenol as needed for discomfort.                         Do not drink alcoholic beverages for 48 hours.     Keep the pressure bandage in place for 24 hours. ([place] Guaze) If the bandage becomes blood tinged or loose, reinforce it with gauze and tape.        (Refer to the reverse side of this page for management of bleeding).    Remove pressure bandage in 24 hours (White Guaze)    Leave the flat bandage (Brown Tape) in place until your follow up appointment.2023    Keep the bandage dry. Wash around it carefully.    If the tape becomes soiled or starts to come off, reinforce it with additional paper tape.    Do not smoke for 3 weeks; smoking is detrimental to wound healing.    It is normal to have swelling and bruising around the surgical site. The bruising will fade in approximately 10-14 days. Elevate the area to reduce swelling.    Numbness, itchiness and sensitivity to temperature changes can occur after surgery and may take up to 18 months to normalize.      POSSIBLE COMPLICATIONS    BLEEDING:    Leave the bandage in place.  Use tightly rolled up gauze or a cloth to apply direct pressure over the bandage for 20   minutes.  Reapply pressure for an additional 20 minutes if necessary  Call the office or go to the nearest emergency room if pressure fails to stop the bleeding.  Use additional gauze and tape to maintain pressure once the bleeding has stopped.        PAIN:    Post operative pain should slowly get better, never worse.  A severe increase in pain may indicate a problem. Call the office if this occurs.    In case of emergency phone:Dr Suazo [phone]           Patient Education       Proper skin care from [facility]:    -Eliminate  harsh soaps as they strip the natural oils from the skin, often resulting in dry itchy skin ( i.e. Dial, Zest, [place])  -Use mild soaps such as Cetaphil or Dove Sensitive Skin in the shower. You do not need to use soap on arms, legs, and trunk every time you shower unless visibly soiled.   -Avoid hot or cold showers.  -After showering, lightly dry off and apply moisturizing within 2-3 minutes. This will help trap moisture in the skin.   -Aggressive use of a moisturizer at least 1-2 times a day to the entire body (including -Vanicream, Cetaphil, Aquaphor or Cerave) and moisturize hands after every washing.  -We recommend using moisturizers that come in a tub that needs to be scooped out, not a pump. This has more of an oil base. It will hold moisture in your skin much better than a water base moisturizer. The above recommended are non-pore clogging.      Wear a sunscreen with at least SPF 30 on your face, ears, neck and V of the chest daily. Wear sunscreen on other areas of the body if those areas are exposed to the sun throughout the day. Sunscreens can contain physical and/or chemical blockers. Physical blockers are less likely to clog pores, these include zinc oxide and titanium dioxide. Reapply every two hour and after swimming.     Sunscreen examples: https://www.ewg.org/sunscreen/    UV radiation  UVA radiation remains constant throughout the day and throughout the year. It is a longer wavelength than UVB and therefore penetrates deeper into the skin leading to immediate and delayed tanning, photoaging, and skin cancer. 70-80% of UVA and UVB radiation occurs between the hours of 10am-2pm.  UVB radiation  UVB radiation causes the most harmful effects and is more significant during the summer months. However, snow and ice can reflect UVB radiation leading to skin damage during the winter months as well. UVB radiation is responsible for tanning, burning, inflammation, delayed erythema (pinkness),  pigmentation (brown spots), and skin cancer.     I recommend self monthly full body exams and yearly full body exams with a dermatology provider. If you develop a new or changing lesion please follow up for examination. Most skin cancers are pink and scaly or pink and pearly. However, we do see blue/brown/black skin cancers.  Consider the ABCDEs of melanoma when giving yourself your monthly full body exam ( don't forget the groin, buttocks, feet, toes, etc). A-asymmetry, B-borders, C-color, D-diameter, E-elevation or evolving. If you see any of these changes please follow up in clinic. If you cannot see your back I recommend purchasing a hand held mirror to use with a larger wall mirror.       Checking for Skin Cancer  You can find cancer early by checking your skin each month. There are 3 kinds of skin cancer. They are melanoma, basal cell carcinoma, and squamous cell carcinoma. Doing monthly skin checks is the best way to find new marks or skin changes. Follow the instructions below for checking your skin.   The ABCDEs of checking moles for melanoma   Check your moles or growths for signs of melanoma using ABCDE:   Asymmetry: the sides of the mole or growth don t match  Border: the edges are ragged, notched, or blurred  Color: the color within the mole or growth varies  Diameter: the mole or growth is larger than 6 mm (size of a pencil eraser)  Evolving: the size, shape, or color of the mole or growth is changing (evolving is not shown in the images below)    Checking for other types of skin cancer  Basal cell carcinoma or squamous cell carcinoma have symptoms such as:     A spot or mole that looks different from all other marks on your skin  Changes in how an area feels, such as itching, tenderness, or pain  Changes in the skin's surface, such as oozing, bleeding, or scaliness  A sore that does not heal  New swelling or redness beyond the border of a mole    Who s at risk?  Anyone can get skin cancer. But you are  at greater risk if you have:   Fair skin, light-colored hair, or light-colored eyes  Many moles or abnormal moles on your skin  A history of sunburns from sunlight or tanning beds  A family history of skin cancer  A history of exposure to radiation or chemicals  A weakened immune system  If you have had skin cancer in the past, you are at risk for recurring skin cancer.   How to check your skin  Do your monthly skin checkups in front of a full-length mirror. Check all parts of your body, including your:   Head (ears, face, neck, and scalp)  Torso (front, back, and sides)  Arms (tops, undersides, upper, and lower armpits)  Hands (palms, backs, and fingers, including under the nails)  Buttocks and genitals  Legs (front, back, and sides)  Feet (tops, soles, toes, including under the nails, and between toes)  If you have a lot of moles, take digital photos of them each month. Make sure to take photos both up close and from a distance. These can help you see if any moles change over time.   Most skin changes are not cancer. But if you see any changes in your skin, call your doctor right away. Only he or she can diagnose a problem. If you have skin cancer, seeing your doctor can be the first step toward getting the treatment that could save your life.   [employer]. last reviewed this educational content on 4/1/2019 2000-2020 [employer]. [address] [place] [address]. All rights reserved. This information is not intended as a substitute for professional medical care. Always follow your healthcare professional's instructions.       When should I call my doctor?  If you are worsening or not improving, please, contact us or seek urgent care as noted below.     Who should I call with questions (adults)?  [facility] (adult and pediatric): [phone]  [facility] (adult): [phone]  [facility] Chery Johnson  [place], [place] and [place]) [phone]  For urgent needs outside of business hours call the [facility] at [phone] and ask for the dermatology resident on call to be paged  If this is a medical emergency and you are unable to reach an ER, Call 911      If you need a prescription refill, please contact your pharmacy. Refills are approved or denied by our Physicians during normal business hours, Monday through Fridays  Per office policy, refills will not be granted if you have not been seen within the past year (or sooner depending on your child's condition)

## 2023-06-14 NOTE — NURSING NOTE
Brent Stewart's chief complaint for this visit includes:  Chief Complaint   Patient presents with     Derm Problem     Cyst- right cheek     PCP: Norah Harvey    Referring Provider:  No referring provider defined for this encounter.    There were no vitals taken for this visit.  No Pain (0)        Allergies   Allergen Reactions     Metoprolol Dizziness     Statins Unknown         Do you need any medication refills at today's visit? No    aCron Lozano MA

## 2023-06-14 NOTE — LETTER
2023         RE: Brent Stewart  [address]  [facility] [address]        Dear Colleague,    Thank you for referring your patient, Brent Stewart, to the [facility]. Please see a copy of my visit note below.    Brent Stewart is an extremely pleasant 62 year old year old male patient here today for evaluation and managment of cyst on right cheek Patient has no other skin complaints today.  Remainder of the HPI, Meds, PMH, Allergies, FH, and SH was reviewed in chart.      Past Medical History:   Diagnosis Date     Hypertension        Past Surgical History:   Procedure Laterality Date     ARTHROSCOPY KNEE RT/LT Right     meniscectomy     HERNIA REPAIR, UMBILICAL  1980     JOINT REPLACEMTN, KNEE RT/LT Right 2015    Joint Replacement knee RT/LT     PHACOEMULSIFICATION WITH STANDARD INTRAOCULAR LENS IMPLANT  2014    Procedure: PHACOEMULSIFICATION WITH STANDARD INTRAOCULAR LENS IMPLANT;  Surgeon: Issac Lee MD;  Location: [facility]     PHACOEMULSIFICATION WITH STANDARD INTRAOCULAR LENS IMPLANT  7/3/2014    Procedure: PHACOEMULSIFICATION WITH STANDARD INTRAOCULAR LENS IMPLANT;  Surgeon: Issac Lee MD;  Location: [facility] OR        Family History   Problem Relation Age of Onset     Diabetes Mother      Cerebrovascular Disease Mother      Diabetes Father      Cancer Father      Coronary Artery Disease Early Onset Father 55        CABG       Social History     Socioeconomic History     Marital status: Single     Spouse name: Not on file     Number of children: Not on file     Years of education: Not on file     Highest education level: Not on file   Occupational History     Not on file   Tobacco Use     Smoking status: Former     Types: Cigarettes     Quit date: 2005     Years since quittin.2     Smokeless tobacco: Former   Vaping Use     Vaping status: Never Used   Substance and Sexual Activity     Alcohol use: Yes     Comment: occas.     Drug use: No      Sexual activity: Yes     Partners: Male   Other Topics Concern     Parent/sibling w/ CABG, MI or angioplasty before 65F 55M? Not Asked   Social History Narrative     Not on file     Social Determinants of Health     Financial Resource Strain: Not on file   Food Insecurity: Not on file   Transportation Needs: Not on file   Physical Activity: Not on file   Stress: Not on file   Social Connections: Not on file   Intimate Partner Violence: Not on file   Housing Stability: Not on file       Outpatient Encounter Medications as of 6/14/2023   Medication Sig Dispense Refill     acyclovir (ZOVIRAX) 400 MG tablet Take 1 tablet (400 mg) by mouth every 8 hours 15 tablet 4     blood glucose (NO BRAND SPECIFIED) test strip Use to test blood sugar 3 times daily or as directed.  One Touch Verio test strips 400 strip 3     ciclopirox (LOPROX) 0.77 % cream Apply twice daily to hands and feet. 90 g 4     Continuous Blood Gluc  (FREESTYLE RANDALL 2 READER) WINDY 1 each continuous 1 each 0     Continuous Blood Gluc Sensor (FREESTYLE RANDALL 2 SENSOR) MISC 1 each every 14 days 6 each 3     Glucagon (GVOKE HYPOPEN 2-PACK) 1 MG/0.2ML SOAJ Inject 1 mg Subcutaneous as needed (To be used if patient is unable to safely consume oral rapid acting carbohydrate) 0.4 mL 0     insulin aspart (NOVOLOG FLEXPEN RELION) 100 UNIT/ML pen 10 units before all meals plus correction 150 - 180 +1u; 181 - 210 +2u; 211 - 240 + 3u; 241 - 270 +4u; 271 - 300 +5u.  May lower base dose from 10 to 6-8units if lower carbohydrate meal. Max TDD 50. 45 mL 3     insulin degludec (TRESIBA FLEXTOUCH) 100 UNIT/ML pen Inject 22 units daily and adjust according to instructions.  Max TDD 40 units. 30 mL 3     insulin pen needle (32G X 6 MM) 32G X 6 MM miscellaneous Use insulin pen needles daily or as directed. 110 each 0     lisinopril (ZESTRIL) 10 MG tablet Take 1 tablet (10 mg) by mouth daily 90 tablet 4     MULTIPLE VITAMIN PO Take 1 tablet by mouth daily       OMEPRAZOLE  PO Take 20 mg by mouth daily       sildenafil (REVATIO) 20 MG tablet Take 1-3 tablets (20-60 mg) by mouth daily as needed (1 hour prior to sexual activity) 30 tablet 4     triamcinolone (KENALOG) 0.1 % external ointment Apply topically 2 times daily 30 g 1     vitamin B-12 (CYANOCOBALAMIN) 100 MCG tablet Take 100 mcg by mouth daily       vitamin D3 (CHOLECALCIFEROL) 50 mcg (2000 units) tablet Take 1 tablet by mouth daily       No facility-administered encounter medications on file as of 6/14/2023.             O:   NAD, WDWN, Alert & Oriented, Mood & Affect wnl, Vitals stable   Here today alone   General appearance normal   Vitals stable   Alert, oriented and in no acute distress     R cheek 1.1cm nodule      Eyes: Conjunctivae/lids:Normal     ENT: Lips, buccal mucosa, tongue: normal    MSK:Normal    Cardiovascular: peripheral edema none    Pulm: Breathing Normal    Neuro/Psych: Orientation:Alert and Orientedx3 ; Mood/Affect:normal       MICRO:   R cheek: cyst lined by stratified squamous epithelium with epidermal keratinization   A/P:  1. Epidermal cyst  Scar discussed with patient   EXCISION OF CYST AND INT: After thorough discussion of PGACAC, consent obtained, anesthesia and prep, the margins of the cyst were identified and an elliptical incision was made encompassing the cyst. The incisions were made through the skin and down to and including the subcutaneous tissue. The cyst was removed en bloc and submitted for frozen pathologic review. The wound edges were widely undermined until adequate tissue mobility was obtained. hemostasis was achieved. The wound edges were then closed in a layered fashion, being careful not to leave any dead space. Postoperative length was 1.4 cm.   EBL minimal; complications none; wound care routine. The patient was discharged in good condition and will return in one week for wound evaluation.    It was a pleasure speaking to Brent Stewart today.        Again, thank you for  allowing me to participate in the care of your patient.        Sincerely,        Edwin Suazo MD

## 2023-06-14 NOTE — PROGRESS NOTES
Brent Stewart is an extremely pleasant 62 year old year old male patient here today for evaluation and managment of cyst on right cheek Patient has no other skin complaints today.  Remainder of the HPI, Meds, PMH, Allergies, FH, and SH was reviewed in chart.      Past Medical History:   Diagnosis Date     Hypertension        Past Surgical History:   Procedure Laterality Date     ARTHROSCOPY KNEE RT/LT Right     meniscectomy     HERNIA REPAIR, UMBILICAL  1980     JOINT REPLACEMTN, KNEE RT/LT Right 2015    Joint Replacement knee RT/LT     PHACOEMULSIFICATION WITH STANDARD INTRAOCULAR LENS IMPLANT  2014    Procedure: PHACOEMULSIFICATION WITH STANDARD INTRAOCULAR LENS IMPLANT;  Surgeon: Issac Lee MD;  Location: [facility] OR     PHACOEMULSIFICATION WITH STANDARD INTRAOCULAR LENS IMPLANT  7/3/2014    Procedure: PHACOEMULSIFICATION WITH STANDARD INTRAOCULAR LENS IMPLANT;  Surgeon: Issac Lee MD;  Location: [facility] OR        Family History   Problem Relation Age of Onset     Diabetes Mother      Cerebrovascular Disease Mother      Diabetes Father      Cancer Father      Coronary Artery Disease Early Onset Father 55        CABG       Social History     Socioeconomic History     Marital status: Single     Spouse name: Not on file     Number of children: Not on file     Years of education: Not on file     Highest education level: Not on file   Occupational History     Not on file   Tobacco Use     Smoking status: Former     Types: Cigarettes     Quit date: 2005     Years since quittin.2     Smokeless tobacco: Former   Vaping Use     Vaping status: Never Used   Substance and Sexual Activity     Alcohol use: Yes     Comment: occas.     Drug use: No     Sexual activity: Yes     Partners: Male   Other Topics Concern     Parent/sibling w/ CABG, MI or angioplasty before 65F 55M? Not Asked   Social History Narrative     Not on file     Social Determinants of Health     Financial Resource Strain: Not on  file   Food Insecurity: Not on file   Transportation Needs: Not on file   Physical Activity: Not on file   Stress: Not on file   Social Connections: Not on file   Intimate Partner Violence: Not on file   Housing Stability: Not on file       Outpatient Encounter Medications as of 6/14/2023   Medication Sig Dispense Refill     acyclovir (ZOVIRAX) 400 MG tablet Take 1 tablet (400 mg) by mouth every 8 hours 15 tablet 4     blood glucose (NO BRAND SPECIFIED) test strip Use to test blood sugar 3 times daily or as directed.  One Touch Verio test strips 400 strip 3     ciclopirox (LOPROX) 0.77 % cream Apply twice daily to hands and feet. 90 g 4     Continuous Blood Gluc  (FREESTYLE RANDALL 2 READER) WINDY 1 each continuous 1 each 0     Continuous Blood Gluc Sensor (FREESTYLE RANDALL 2 SENSOR) MISC 1 each every 14 days 6 each 3     Glucagon (GVOKE HYPOPEN 2-PACK) 1 MG/0.2ML SOAJ Inject 1 mg Subcutaneous as needed (To be used if patient is unable to safely consume oral rapid acting carbohydrate) 0.4 mL 0     insulin aspart (NOVOLOG FLEXPEN RELION) 100 UNIT/ML pen 10 units before all meals plus correction 150 - 180 +1u; 181 - 210 +2u; 211 - 240 + 3u; 241 - 270 +4u; 271 - 300 +5u.  May lower base dose from 10 to 6-8units if lower carbohydrate meal. Max TDD 50. 45 mL 3     insulin degludec (TRESIBA FLEXTOUCH) 100 UNIT/ML pen Inject 22 units daily and adjust according to instructions.  Max TDD 40 units. 30 mL 3     insulin pen needle (32G X 6 MM) 32G X 6 MM miscellaneous Use insulin pen needles daily or as directed. 110 each 0     lisinopril (ZESTRIL) 10 MG tablet Take 1 tablet (10 mg) by mouth daily 90 tablet 4     MULTIPLE VITAMIN PO Take 1 tablet by mouth daily       OMEPRAZOLE PO Take 20 mg by mouth daily       sildenafil (REVATIO) 20 MG tablet Take 1-3 tablets (20-60 mg) by mouth daily as needed (1 hour prior to sexual activity) 30 tablet 4     triamcinolone (KENALOG) 0.1 % external ointment Apply topically 2 times daily  30 g 1     vitamin B-12 (CYANOCOBALAMIN) 100 MCG tablet Take 100 mcg by mouth daily       vitamin D3 (CHOLECALCIFEROL) 50 mcg (2000 units) tablet Take 1 tablet by mouth daily       No facility-administered encounter medications on file as of 6/14/2023.             O:   NAD, WDWN, Alert & Oriented, Mood & Affect wnl, Vitals stable   Here today alone   General appearance normal   Vitals stable   Alert, oriented and in no acute distress     R cheek 1.1cm nodule      Eyes: Conjunctivae/lids:Normal     ENT: Lips, buccal mucosa, tongue: normal    MSK:Normal    Cardiovascular: peripheral edema none    Pulm: Breathing Normal    Neuro/Psych: Orientation:Alert and Orientedx3 ; Mood/Affect:normal       MICRO:   R cheek: cyst lined by stratified squamous epithelium with epidermal keratinization   A/P:  1. Epidermal cyst  Scar discussed with patient   EXCISION OF CYST AND INT: After thorough discussion of PGACAC, consent obtained, anesthesia and prep, the margins of the cyst were identified and an elliptical incision was made encompassing the cyst. The incisions were made through the skin and down to and including the subcutaneous tissue. The cyst was removed en bloc and submitted for frozen pathologic review. The wound edges were widely undermined until adequate tissue mobility was obtained. hemostasis was achieved. The wound edges were then closed in a layered fashion, being careful not to leave any dead space. Postoperative length was 1.4 cm.   EBL minimal; complications none; wound care routine. The patient was discharged in good condition and will return in one week for wound evaluation.    It was a pleasure speaking to Brent Stewart today.

## 2023-06-29 ENCOUNTER — PATIENT OUTREACH (OUTPATIENT)
Dept: FAMILY MEDICINE | Facility: CLINIC | Age: 63
End: 2023-06-29
Payer: COMMERCIAL

## 2023-06-29 NOTE — TELEPHONE ENCOUNTER
Patient Quality Outreach    Patient is due for the following:   Diabetes -  Eye Exam    Next Steps:   No follow up needed at this time.    Type of outreach:    Chart review performed, no outreach needed.      Questions for provider review:    None           Meggan Panchal

## 2023-07-27 ENCOUNTER — TELEPHONE (OUTPATIENT)
Dept: DERMATOLOGY | Facility: CLINIC | Age: 63
End: 2023-07-27
Payer: COMMERCIAL

## 2023-07-27 NOTE — TELEPHONE ENCOUNTER
[facility] Call Center    Phone Message    May a detailed message be left on voicemail: yes     Reason for Call: Other: Pt states he needs to cancel the 7/31/23 procedure visit and reschedule.      Writer did not cancel in Epic.     Please call Justin back to reschedule at [phone].    Thank you.     Action Taken: Other: [place] derm    Travel Screening: Not Applicable

## 2023-09-25 ENCOUNTER — OFFICE VISIT (OUTPATIENT)
Dept: DERMATOLOGY | Facility: CLINIC | Age: 63
End: 2023-09-25
Payer: COMMERCIAL

## 2023-09-25 DIAGNOSIS — D23.9 DERMAL NEVUS: ICD-10-CM

## 2023-09-25 DIAGNOSIS — L82.1 SEBORRHEIC KERATOSIS: ICD-10-CM

## 2023-09-25 DIAGNOSIS — L81.4 LENTIGO: ICD-10-CM

## 2023-09-25 DIAGNOSIS — D23.9 HIDROCYSTOMA: Primary | ICD-10-CM

## 2023-09-25 DIAGNOSIS — D18.01 CHERRY ANGIOMA: ICD-10-CM

## 2023-09-25 DIAGNOSIS — L82.0 INFLAMED SEBORRHEIC KERATOSIS: ICD-10-CM

## 2023-09-25 PROCEDURE — 99213 OFFICE O/P EST LOW 20 MIN: CPT | Mod: 25 | Performed by: DERMATOLOGY

## 2023-09-25 PROCEDURE — 17110 DESTRUCTION B9 LES UP TO 14: CPT | Performed by: DERMATOLOGY

## 2023-09-25 ASSESSMENT — PAIN SCALES - GENERAL: PAINLEVEL: NO PAIN (0)

## 2023-09-25 NOTE — PROGRESS NOTES
Brent Stewart is an extremely pleasant 63 year old year old male patient here today for itching spots on back and spot on eyelid.  Patient has no other skin complaints today.  Remainder of the HPI, Meds, PMH, Allergies, FH, and SH was reviewed in chart.      Past Medical History:   Diagnosis Date    Hypertension        Past Surgical History:   Procedure Laterality Date    ARTHROSCOPY KNEE RT/LT Right     meniscectomy    HERNIA REPAIR, UMBILICAL  1980    JOINT REPLACEMTN, KNEE RT/LT Right 2015    Joint Replacement knee RT/LT    PHACOEMULSIFICATION WITH STANDARD INTRAOCULAR LENS IMPLANT  2014    Procedure: PHACOEMULSIFICATION WITH STANDARD INTRAOCULAR LENS IMPLANT;  Surgeon: Issac Lee MD;  Location: [facility] OR    PHACOEMULSIFICATION WITH STANDARD INTRAOCULAR LENS IMPLANT  7/3/2014    Procedure: PHACOEMULSIFICATION WITH STANDARD INTRAOCULAR LENS IMPLANT;  Surgeon: Issac Lee MD;  Location: [facility] OR        Family History   Problem Relation Age of Onset    Diabetes Mother     Cerebrovascular Disease Mother     Diabetes Father     Cancer Father     Coronary Artery Disease Early Onset Father 55        CABG       Social History     Socioeconomic History    Marital status: Single     Spouse name: Not on file    Number of children: Not on file    Years of education: Not on file    Highest education level: Not on file   Occupational History    Not on file   Tobacco Use    Smoking status: Former     Types: Cigarettes     Quit date: 2005     Years since quittin.4    Smokeless tobacco: Former   Vaping Use    Vaping Use: Never used   Substance and Sexual Activity    Alcohol use: Yes     Comment: occas.    Drug use: No    Sexual activity: Yes     Partners: Male   Other Topics Concern    Parent/sibling w/ CABG, MI or angioplasty before 65F 55M? Not Asked   Social History Narrative    Not on file     Social Determinants of Health     Financial Resource Strain: Not on file   Food Insecurity: Not on  file   Transportation Needs: Not on file   Physical Activity: Not on file   Stress: Not on file   Social Connections: Not on file   Interpersonal Safety: Not on file   Housing Stability: Not on file       Outpatient Encounter Medications as of 9/25/2023   Medication Sig Dispense Refill    acyclovir (ZOVIRAX) 400 MG tablet Take 1 tablet (400 mg) by mouth every 8 hours 15 tablet 4    blood glucose (NO BRAND SPECIFIED) test strip Use to test blood sugar 3 times daily or as directed.  One Touch Verio test strips 400 strip 3    ciclopirox (LOPROX) 0.77 % cream Apply twice daily to hands and feet. 90 g 4    Continuous Blood Gluc  (FREESTYLE RANDALL 2 READER) WINDY 1 each continuous 1 each 0    Continuous Blood Gluc Sensor (FREESTYLE RANDALL 2 SENSOR) MISC 1 each every 14 days 6 each 3    Glucagon (GVOKE HYPOPEN 2-PACK) 1 MG/0.2ML SOAJ Inject 1 mg Subcutaneous as needed (To be used if patient is unable to safely consume oral rapid acting carbohydrate) 0.4 mL 0    insulin aspart (NOVOLOG FLEXPEN RELION) 100 UNIT/ML pen 10 units before all meals plus correction 150 - 180 +1u; 181 - 210 +2u; 211 - 240 + 3u; 241 - 270 +4u; 271 - 300 +5u.  May lower base dose from 10 to 6-8units if lower carbohydrate meal. Max TDD 50. 45 mL 3    insulin degludec (TRESIBA FLEXTOUCH) 100 UNIT/ML pen Inject 22 units daily and adjust according to instructions.  Max TDD 40 units. 30 mL 3    insulin pen needle (32G X 6 MM) 32G X 6 MM miscellaneous Use insulin pen needles daily or as directed. 110 each 0    lisinopril (ZESTRIL) 10 MG tablet Take 1 tablet (10 mg) by mouth daily 90 tablet 4    MULTIPLE VITAMIN PO Take 1 tablet by mouth daily      OMEPRAZOLE PO Take 20 mg by mouth daily      sildenafil (REVATIO) 20 MG tablet Take 1-3 tablets (20-60 mg) by mouth daily as needed (1 hour prior to sexual activity) 30 tablet 4    triamcinolone (KENALOG) 0.1 % external ointment Apply topically 2 times daily 30 g 1    vitamin B-12 (CYANOCOBALAMIN) 100 MCG  tablet Take 100 mcg by mouth daily      vitamin D3 (CHOLECALCIFEROL) 50 mcg (2000 units) tablet Take 1 tablet by mouth daily       No facility-administered encounter medications on file as of 9/25/2023.             O:   NAD, WDWN, Alert & Oriented, Mood & Affect wnl, Vitals stable   Here today alone   General appearance normal   Vitals stable   Alert, oriented and in no acute distress     L upper eyelid clear papule  Inflamed seborrheic keratosis back    Stuck on papules and brown macules on trunk and ext   Red papules on trunk  Flesh colored papules on trunk         Eyes: Conjunctivae/lids:Normal     ENT: Lips, buccal mucosa, tongue: normal    MSK:Normal    Cardiovascular: peripheral edema none    Pulm: Breathing Normal    Neuro/Psych: Orientation:Alert and Orientedx3 ; Mood/Affect:normal       A/P:  1. Seborrheic keratosis, lentigo, angioma, dermal nevus  2. Inflamed seborrheic keratosis back   Back x6,   L hand x1  LN2:  Treated with LN2 for 5s for 1-2 cycles. Warned risks of blistering, pain, pigment change, scarring, and incomplete resolution.  Advised patient to return if lesions do not completely resolve.  Wound care sheet given.    3. Hidrocystoma   Excision discussed with patient   It was a pleasure speaking to Brent Stewart today.  Previous clinic notes and pertinent laboratory tests were reviewed prior to Brent Stewart's visit.  Nature and genetics of benign skin lesions dicussed with patient.  Signs and Symptoms of skin cancer discussed with patient.  Patient encouraged to perform monthly skin exams.  UV precautions reviewed with patient.  Risks of non-melanoma skin cancer discussed with patient   Return to clinic 12 months

## 2023-09-25 NOTE — LETTER
2023         RE: Brent Stewart  [address]  [facility] [address]        Dear Colleague,    Thank you for referring your patient, Brent Stewart, to the [facility]. Please see a copy of my visit note below.    Brent Stewart is an extremely pleasant 63 year old year old male patient here today for itching spots on back and spot on eyelid.  Patient has no other skin complaints today.  Remainder of the HPI, Meds, PMH, Allergies, FH, and SH was reviewed in chart.      Past Medical History:   Diagnosis Date     Hypertension        Past Surgical History:   Procedure Laterality Date     ARTHROSCOPY KNEE RT/LT Right     meniscectomy     HERNIA REPAIR, UMBILICAL  1980     JOINT REPLACEMTN, KNEE RT/LT Right 2015    Joint Replacement knee RT/LT     PHACOEMULSIFICATION WITH STANDARD INTRAOCULAR LENS IMPLANT  2014    Procedure: PHACOEMULSIFICATION WITH STANDARD INTRAOCULAR LENS IMPLANT;  Surgeon: Issac Lee MD;  Location: [facility] OR     PHACOEMULSIFICATION WITH STANDARD INTRAOCULAR LENS IMPLANT  7/3/2014    Procedure: PHACOEMULSIFICATION WITH STANDARD INTRAOCULAR LENS IMPLANT;  Surgeon: Issac Lee MD;  Location: [facility] OR        Family History   Problem Relation Age of Onset     Diabetes Mother      Cerebrovascular Disease Mother      Diabetes Father      Cancer Father      Coronary Artery Disease Early Onset Father 55        CABG       Social History     Socioeconomic History     Marital status: Single     Spouse name: Not on file     Number of children: Not on file     Years of education: Not on file     Highest education level: Not on file   Occupational History     Not on file   Tobacco Use     Smoking status: Former     Types: Cigarettes     Quit date: 2005     Years since quittin.4     Smokeless tobacco: Former   Vaping Use     Vaping Use: Never used   Substance and Sexual Activity     Alcohol use: Yes     Comment: occas.     Drug use: No     Sexual  activity: Yes     Partners: Male   Other Topics Concern     Parent/sibling w/ CABG, MI or angioplasty before 65F 55M? Not Asked   Social History Narrative     Not on file     Social Determinants of Health     Financial Resource Strain: Not on file   Food Insecurity: Not on file   Transportation Needs: Not on file   Physical Activity: Not on file   Stress: Not on file   Social Connections: Not on file   Interpersonal Safety: Not on file   Housing Stability: Not on file       Outpatient Encounter Medications as of 9/25/2023   Medication Sig Dispense Refill     acyclovir (ZOVIRAX) 400 MG tablet Take 1 tablet (400 mg) by mouth every 8 hours 15 tablet 4     blood glucose (NO BRAND SPECIFIED) test strip Use to test blood sugar 3 times daily or as directed.  One Touch Verio test strips 400 strip 3     ciclopirox (LOPROX) 0.77 % cream Apply twice daily to hands and feet. 90 g 4     Continuous Blood Gluc  (FREESTYLE RANDALL 2 READER) WINDY 1 each continuous 1 each 0     Continuous Blood Gluc Sensor (FREESTYLE RANDALL 2 SENSOR) MISC 1 each every 14 days 6 each 3     Glucagon (GVOKE HYPOPEN 2-PACK) 1 MG/0.2ML SOAJ Inject 1 mg Subcutaneous as needed (To be used if patient is unable to safely consume oral rapid acting carbohydrate) 0.4 mL 0     insulin aspart (NOVOLOG FLEXPEN RELION) 100 UNIT/ML pen 10 units before all meals plus correction 150 - 180 +1u; 181 - 210 +2u; 211 - 240 + 3u; 241 - 270 +4u; 271 - 300 +5u.  May lower base dose from 10 to 6-8units if lower carbohydrate meal. Max TDD 50. 45 mL 3     insulin degludec (TRESIBA FLEXTOUCH) 100 UNIT/ML pen Inject 22 units daily and adjust according to instructions.  Max TDD 40 units. 30 mL 3     insulin pen needle (32G X 6 MM) 32G X 6 MM miscellaneous Use insulin pen needles daily or as directed. 110 each 0     lisinopril (ZESTRIL) 10 MG tablet Take 1 tablet (10 mg) by mouth daily 90 tablet 4     MULTIPLE VITAMIN PO Take 1 tablet by mouth daily       OMEPRAZOLE PO Take 20  mg by mouth daily       sildenafil (REVATIO) 20 MG tablet Take 1-3 tablets (20-60 mg) by mouth daily as needed (1 hour prior to sexual activity) 30 tablet 4     triamcinolone (KENALOG) 0.1 % external ointment Apply topically 2 times daily 30 g 1     vitamin B-12 (CYANOCOBALAMIN) 100 MCG tablet Take 100 mcg by mouth daily       vitamin D3 (CHOLECALCIFEROL) 50 mcg (2000 units) tablet Take 1 tablet by mouth daily       No facility-administered encounter medications on file as of 9/25/2023.             O:   NAD, WDWN, Alert & Oriented, Mood & Affect wnl, Vitals stable   Here today alone   General appearance normal   Vitals stable   Alert, oriented and in no acute distress     L upper eyelid clear papule  Inflamed seborrheic keratosis back    Stuck on papules and brown macules on trunk and ext   Red papules on trunk  Flesh colored papules on trunk         Eyes: Conjunctivae/lids:Normal     ENT: Lips, buccal mucosa, tongue: normal    MSK:Normal    Cardiovascular: peripheral edema none    Pulm: Breathing Normal    Neuro/Psych: Orientation:Alert and Orientedx3 ; Mood/Affect:normal       A/P:  1. Seborrheic keratosis, lentigo, angioma, dermal nevus  2. Inflamed seborrheic keratosis back   Back x6,   L hand x1  LN2:  Treated with LN2 for 5s for 1-2 cycles. Warned risks of blistering, pain, pigment change, scarring, and incomplete resolution.  Advised patient to return if lesions do not completely resolve.  Wound care sheet given.    3. Hidrocystoma   Excision discussed with patient   It was a pleasure speaking to Brent Stewart today.  Previous clinic notes and pertinent laboratory tests were reviewed prior to Brent Stewart's visit.  Nature and genetics of benign skin lesions dicussed with patient.  Signs and Symptoms of skin cancer discussed with patient.  Patient encouraged to perform monthly skin exams.  UV precautions reviewed with patient.  Risks of non-melanoma skin cancer discussed with patient   Return to clinic  12 months      Again, thank you for allowing me to participate in the care of your patient.        Sincerely,        Edwin Suazo MD

## 2023-09-25 NOTE — NURSING NOTE
Brent Stewart's chief complaint for this visit includes:  Chief Complaint   Patient presents with    Derm Problem     Cyst- left eye     PCP: Norah Harvey    Referring Provider:  No referring provider defined for this encounter.    There were no vitals taken for this visit.  No Pain (0)        Allergies   Allergen Reactions    Metoprolol Dizziness    Statins Unknown         Do you need any medication refills at today's visit? No    Caron Lozano MA

## 2024-02-22 NOTE — PROGRESS NOTES
Diabetes Self-Management Education & Support    Presents for: Individual review Type II diabetes      Type of Service: Telephone Visit     Originating Location (Patient Location): Home  Distant Location (Provider Location): [facility]  Mode of Communication:  Telephone     Telephone Visit Start Time: 10:30  Telephone Visit End Time (telephone visit stop time): 11:05     How would patient like to obtain AVS? Mail a copy  Assessment Type:   ASSESSMENT:  11/10/2022  Patient with known history of type 2 diabetes for approximately 8 years.  Patient was seen in the clinic on 11/10/2022  11/2/2022 by provider with complaints of 40 pound weight loss in 2 months.  Labs showing moderate DKA and was sent to the hospital     Admitted to the hospital on 11/2/2022 and discharged on 11/4/2022.  Patient discharged on Lantus 22 units at bedtime and NovoLog 10 units 3 times daily with meals.       Previous to hospitalization   A1c 7/23/2021 8.1%  A1c 4/6/2022 7.5%  At 1 point in 2021  It looks like patient had been on glipizide 5 mg daily 8/4/2021 - 9/20/2021 and was going to start on Trulicity 9/20/2021 by reviewing the chart it does not look like patient started that therapy and did not go back to taking glipizide.   Metformin -patient has a history of intolerance.     CT result- normal pancreas  A1c 14.2% equals estimated average glucose 361 mg/dL    11/17/2022  Follow up education/ evaluation of glucose readings/ insulin management.      Lantus -patient only increased from 22 units to 23 units and did not increase as directed ongoing until a.m. readings between 80 and 130.  Today reeducation on Lantus titration recommendations   a.m. glucose readings continue to be elevated 205, 225, 235 mg/dL     NovoLog patient is taking as directed and is using the correction scale appropriately.  NovoLog -10 units before all meals plus correction factor  150 - 180 +1  181 - 210 +2  211 - 240 +3  241 - 270 +4  271  - 300 +5              Patient did likely have an episode of hypoglycemia recently with symptoms of feeling shaky.  Patient did treat with juice and felt better.  When reviewing intake and insulin dosing patient had consumed a lower carbohydrate breakfast than usual.  I discussed options for varying NovoLog dosing based on intake, will not change to insulin to carb ratio yet but will have patient decrease dose by 2 units when intake is going to be a smaller amount of carbohydrate.    Education on severe hypoglycemia we will order Gvoke (glucagon injection).  Patient will print off discount card at home from website.    Blood glucose testing 3 times daily AC    12/14/2022 patient unable to provide glucose readings today because patient notes he accidentally deleted the memory off of the meter.  Patient is interested in using a freestyle vinny 2 system - ordered today.    Patient does note that he had been on Ozempic and discontinued due to feeling sick.  Patient will be meeting with Endo 1/2023.    No reported episodes of hypoglycemia.    Current insulin dosing   Lantus 26u (pt did increase as directed)    NovoLog patient is taking as directed and is using the correction scale appropriately.  NovoLog -10 units before all meals plus correction factor  If lower carb meal decrease dose to 6-8 units.  Pt is not quite interested in I:C ratio yet.  Vinny 2 will be very helpful for pt.    150 - 180 +1  181 - 210 +2  211 - 240 +3  241 - 270 +4  271 - 300 +5           Patient's most recent   Lab Results   Component Value Date    A1C 14.2 11/01/2022     is not meeting goal of <7.0    Diabetes knowledge and skills assessment:   Patient is knowledgeable in diabetes management concepts related to: Healthy Eating, Being Active, Monitoring, Taking Medication, Problem Solving, Reducing Risks and Healthy Coping    Continue education with the following diabetes management concepts: Healthy Eating, Being Active, Monitoring, Taking  "Medication, Problem Solving, Reducing Risks and Healthy Coping - review in all areas as needed     Based on learning assessment above, most appropriate setting for further diabetes education would be: Individual setting.      PLAN    Lantus - 26 units and continue to adjust to maintain morning blood glucose is between 80 and 130 mg/dL     NovoLog -10 units before all meals plus correction factor (can decrease to 6 - 8 units if lower carbohydrate meal)  150 - 180 +1  181 - 210 +2  211 - 240 +3  241 - 270 +4  271 - 300 +5              Blood glucose testing 3 times daily AC or Vinny 2    Topics to cover at upcoming visits: review and ongoing education as needed, vinny download     Follow-up: 2 month     See Care Plan for co-developed, patient-state behavior change goals.  AVS provided for patient today.    Education Materials Provided: [employer] system       SUBJECTIVE/OBJECTIVE:     Cultural Influences/Ethnic Background:  Not  or       Diabetes Symptoms & Complications:          Patient Problem List and Family Medical History reviewed for relevant medical history, current medical status, and diabetes risk factors.    Vitals:  There were no vitals taken for this visit.  Estimated body mass index is 32.86 kg/m  as calculated from the following:    Height as of 12/7/22: 1.778 m (5' 10\").    Weight as of 12/7/22: 103.9 kg (229 lb).   Last 3 BP:   BP Readings from Last 3 Encounters:   12/07/22 (!) 148/82   11/04/22 (!) 142/79   11/02/22 128/84       History   Smoking Status     Former     Types: Cigarettes     Quit date: 4/1/2005   Smokeless Tobacco     Former       Labs:  Lab Results   Component Value Date    A1C 14.2 11/01/2022     Lab Results   Component Value Date     11/04/2022     04/06/2022     Lab Results   Component Value Date    LDL  11/01/2022      Comment:      Cannot estimate LDL when triglyceride exceeds 400 mg/dL     11/01/2022     04/06/2022     Direct Measure HDL "   Date Value Ref Range Status   11/01/2022 35 (L) >=40 mg/dL Final   ]  GFR Estimate   Date Value Ref Range Status   11/04/2022 >90 >60 mL/min/1.73m2 Final     Comment:     Effective December 21, 2021 eGFRcr in adults is calculated using the 2021 CKD-EPI creatinine equation which includes age and gender (Emile cox al., [place], DOI: 10.1056/AKTSol8208127)     GFR, ESTIMATED POCT   Date Value Ref Range Status   11/01/2022 >60 >60 mL/min/1.73m2 Final     No results found for: GFRESTBLACK  Lab Results   Component Value Date    CR 0.61 11/04/2022     No results found for: MICROALBUMIN    Healthy Eating:  Healthy Eating Assessed Today: Yes  Cultural/Restorationist diet restrictions?: No  Meal planning/habits: Avoiding sweets, Calorie counting, Carb counting, Low salt  How many times a week on average do you eat food made away from home (restaurant/take-out)?: 1  Meals include: Dinner, Afternoon Snack, Evening Snack  Breakfast: egg vegetable omlet  Lunch: sandwich  Dinner: meat, potato, and if he remembers some vegetables  Snacks: likes apples,  Other: has been doing keto diet  Beverages: Water, Coffee  Has patient met with a dietitian in the past?: Yes    Being Active:  Being Active Assessed Today: Yes  Exercise:: Yes  Days per week of moderate to strenuous exercise (like a brisk walk): 4  On average, minutes per day of exercise at this level: 20  How intense was your typical exercise? : Light (like stretching or slow walking)  Exercise Minutes per Week: 80  Barrier to exercise: None    Monitoring:  Monitoring Assessed Today: Yes  Did patient bring glucose meter to appointment? : No  Blood Glucose Meter: One Touch, FreeStyle  Times checking blood sugar at home (number): 3  Times checking blood sugar at home (per): Day  Blood glucose trend: Decreasing    AM readings 205 - 235mg/dL, evening readings 124 - 173 mg/dL    Taking Medications:  Diabetes Medication(s)     Diabetic Other       Glucagon (GVOKE HYPOPEN 2-PACK) 1 MG/0.2ML  SOAJ    Inject 1 mg Subcutaneous as needed (To be used if patient is unable to safely consume oral rapid acting carbohydrate)    Insulin       insulin aspart (NOVOLOG FLEXPEN RELION) 100 UNIT/ML pen    Inject 10 Units Subcutaneous 3 times daily (with meals)     Insulin Glargine-yfgn (SEMGLEE, YFGN,) 100 UNIT/ML SOPN    Inject 22 Units Subcutaneous At Bedtime      Lantus -increase to 24 units and continue to increase by 1 unit every evening until your morning blood glucose is between 80 and 130 mg/dL     NovoLog -10 units before all meals plus correction factor (patient to decrease to 8 units if consuming lower carbohydrate or smaller portion meal)  150 - 180 +1  181 - 210 +2  211 - 240 +3  241 - 270 +4  271 - 300 +5              Blood glucose testing 3 times daily AC    Taking Medication Assessed Today: Yes  Current Treatments: Insulin Injections  Dose schedule: Pre-breakfast, Pre-lunch, Pre-dinner, At bedtime  Given by: Patient  Injection/Infusion sites: Abdomen  Problems taking diabetes medications regularly?: No  Diabetes medication side effects?: No    Problem Solving:  Problem Solving Assessed Today: Yes  Is the patient at risk for hypoglycemia?: Yes  Hypoglycemia Frequency: Never  Hypoglycemia Treatment: Juice, Glucose (tablets or gel)  Does patient have glucagon emergency kit?:  (ordering today)    Hypoglycemia symptoms  Confusion: No  Dizziness or Light-Headedness: No  Headaches: No  Hunger: No  Mood changes: No  Nervousness/Anxiety: No  Sleepiness: No  Speech difficulty: No  Sweats: No  Feeling shaky: Yes    Hypoglycemia Complications  Blackouts: No  Hospitalization: No  Nocturnal hypoglycemia: No  Required assistance: No  Required glucagon injection: No  Seizures: No    Reducing Risks:  Diabetes Risks: Age over 45 years, Family History, Hypertriglyceridemia  CAD Risks: Diabetes Mellitus, Male sex, Obesity, Family history    Healthy Coping:  Healthy Coping Assessed Today: Yes  Emotional response to  diabetes: Acceptance  Informal Support system:: Family  Stage of change: ACTION (Actively working towards change)  Support resources: Websites  Patient Activation Measure Survey Score:  No flowsheet data found.      Care Plan and Education Provided:  There are no care plans that you recently modified to display for this patient.      Time Spent: 30 minutes billable time  Encounter Type: Individual via phone    Any diabetes medication dose changes were made via the CDE Protocol per the patient's referring provider. A copy of this encounter was shared with the provider.   No

## 2024-03-28 ENCOUNTER — PATIENT OUTREACH (OUTPATIENT)
Dept: FAMILY MEDICINE | Facility: CLINIC | Age: 64
End: 2024-03-28

## 2024-03-28 NOTE — TELEPHONE ENCOUNTER
Patient Quality Outreach    Patient is due for the following:   Depression  -  PHQ-9 needed    Next Steps:   Patient was assigned appropriate questionnaire to complete    Type of outreach:    Sent Home Chef message.      Questions for provider review:    None           Meggan Panchal

## 2025-02-13 ENCOUNTER — PATIENT OUTREACH (OUTPATIENT)
Dept: FAMILY MEDICINE | Facility: CLINIC | Age: 65
End: 2025-02-13

## 2025-02-13 NOTE — TELEPHONE ENCOUNTER
Patient Quality Outreach    Patient is due for the following:   Physical Annual Wellness Visit    Action(s) Taken:   No follow up needed at this time.    Type of outreach:    Chart review performed, no outreach needed.    Questions for provider review:    None           Meggan Panchal